# Patient Record
Sex: MALE | Race: BLACK OR AFRICAN AMERICAN
[De-identification: names, ages, dates, MRNs, and addresses within clinical notes are randomized per-mention and may not be internally consistent; named-entity substitution may affect disease eponyms.]

---

## 2018-01-06 ENCOUNTER — HOSPITAL ENCOUNTER (EMERGENCY)
Dept: HOSPITAL 62 - ER | Age: 33
Discharge: HOME | End: 2018-01-06
Payer: MEDICARE

## 2018-01-06 VITALS — SYSTOLIC BLOOD PRESSURE: 156 MMHG | DIASTOLIC BLOOD PRESSURE: 86 MMHG

## 2018-01-06 DIAGNOSIS — S89.92XA: Primary | ICD-10-CM

## 2018-01-06 DIAGNOSIS — F17.200: ICD-10-CM

## 2018-01-06 DIAGNOSIS — M25.562: ICD-10-CM
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
## 2018-01-06 LAB
ADD MANUAL DIFF: NO
ALBUMIN SERPL-MCNC: 4.1 G/DL (ref 3.5–5)
ALP SERPL-CCNC: 76 U/L (ref 38–126)
ALT SERPL-CCNC: 27 U/L (ref 21–72)
ANION GAP SERPL CALC-SCNC: 10 MMOL/L (ref 5–19)
AST SERPL-CCNC: 26 U/L (ref 17–59)
BASOPHILS # BLD AUTO: 0.1 10^3/UL (ref 0–0.2)
BASOPHILS NFR BLD AUTO: 0.8 % (ref 0–2)
BILIRUB DIRECT SERPL-MCNC: 0.3 MG/DL (ref 0–0.4)
BILIRUB SERPL-MCNC: 0.4 MG/DL (ref 0.2–1.3)
BUN SERPL-MCNC: 12 MG/DL (ref 7–20)
CALCIUM: 10 MG/DL (ref 8.4–10.2)
CHLORIDE SERPL-SCNC: 106 MMOL/L (ref 98–107)
CO2 SERPL-SCNC: 26 MMOL/L (ref 22–30)
CRP SERPL-MCNC: < 5 MG/L (ref ?–10)
EOSINOPHIL # BLD AUTO: 0 10^3/UL (ref 0–0.6)
EOSINOPHIL NFR BLD AUTO: 0.4 % (ref 0–6)
ERYTHROCYTE [DISTWIDTH] IN BLOOD BY AUTOMATED COUNT: 14.2 % (ref 11.5–14)
ERYTHROCYTE [SEDIMENTATION RATE] IN BLOOD: 7 MM/HR (ref 0–15)
GLUCOSE SERPL-MCNC: 112 MG/DL (ref 75–110)
HCT VFR BLD CALC: 43.9 % (ref 37.9–51)
HGB BLD-MCNC: 15 G/DL (ref 13.5–17)
LYMPHOCYTES # BLD AUTO: 1 10^3/UL (ref 0.5–4.7)
LYMPHOCYTES NFR BLD AUTO: 9.2 % (ref 13–45)
MCH RBC QN AUTO: 30.6 PG (ref 27–33.4)
MCHC RBC AUTO-ENTMCNC: 34.3 G/DL (ref 32–36)
MCV RBC AUTO: 89 FL (ref 80–97)
MONOCYTES # BLD AUTO: 1 10^3/UL (ref 0.1–1.4)
MONOCYTES NFR BLD AUTO: 9.1 % (ref 3–13)
NEUTROPHILS # BLD AUTO: 8.6 10^3/UL (ref 1.7–8.2)
NEUTS SEG NFR BLD AUTO: 80.5 % (ref 42–78)
PLATELET # BLD: 196 10^3/UL (ref 150–450)
POTASSIUM SERPL-SCNC: 3.6 MMOL/L (ref 3.6–5)
PROT SERPL-MCNC: 7.2 G/DL (ref 6.3–8.2)
RBC # BLD AUTO: 4.92 10^6/UL (ref 4.35–5.55)
SODIUM SERPL-SCNC: 141.9 MMOL/L (ref 137–145)
TOTAL CELLS COUNTED % (AUTO): 100 %
URATE SERPL-MCNC: 5.6 MG/DL (ref 3.5–8.5)
WBC # BLD AUTO: 10.7 10^3/UL (ref 4–10.5)

## 2018-01-06 PROCEDURE — L1830 KO IMMOB CANVAS LONG PRE OTS: HCPCS

## 2018-01-06 PROCEDURE — 85025 COMPLETE CBC W/AUTO DIFF WBC: CPT

## 2018-01-06 PROCEDURE — 73562 X-RAY EXAM OF KNEE 3: CPT

## 2018-01-06 PROCEDURE — 99284 EMERGENCY DEPT VISIT MOD MDM: CPT

## 2018-01-06 PROCEDURE — 84550 ASSAY OF BLOOD/URIC ACID: CPT

## 2018-01-06 PROCEDURE — 86140 C-REACTIVE PROTEIN: CPT

## 2018-01-06 PROCEDURE — 80053 COMPREHEN METABOLIC PANEL: CPT

## 2018-01-06 PROCEDURE — 85652 RBC SED RATE AUTOMATED: CPT

## 2018-01-06 PROCEDURE — 36415 COLL VENOUS BLD VENIPUNCTURE: CPT

## 2018-01-06 NOTE — ER DOCUMENT REPORT
ED Extremity Problem, Lower





- General


Chief Complaint: Knee Injury


Stated Complaint: LEFT KNEE PAIN


Time Seen by Provider: 01/06/18 01:09


Notes: 





Patient is a 32-year-old male who comes emergency department for chief 

complaint of left knee pain.  He states that he has landed on the knee 

accidentally because it hurt and he could not bear his weight and then he fell 

and landed on the knee, he states he has done this several times, he states 

that today the pain and swelling became worse and he cannot walk on it now.  He 

denies fever or chills.  He denies history of IV drug abuse, he denies history 

of joint surgery or joint infection.  He denies any other complaints.  Past 

medical history of hypertension and seizures, medicated for both.





- Related Data


Allergies/Adverse Reactions: 


 





No Known Allergies Allergy (Verified 01/06/18 00:41)


 











Past Medical History





- General


Information source: Patient





- Social History


Smoking Status: Current Some Day Smoker


Chew tobacco use (# tins/day): No


Frequency of alcohol use: None


Drug Abuse: None


Lives with: Family


Family History: None, Reviewed & Not Pertinent


Patient has suicidal ideation: No


Patient has homicidal ideation: No





- Past Medical History


Cardiac Medical History: Reports: Hx Hypertension


Neurological Medical History: Reports: Hx Seizures


Renal/ Medical History: Denies: Hx Peritoneal Dialysis


Surgical Hx: Negative





- Immunizations


Immunizations up to date: No


Hx Diphtheria, Pertussis, Tetanus Vaccination: Yes





Review of Systems





- Review of Systems


Constitutional: No symptoms reported


EENT: No symptoms reported


Cardiovascular: No symptoms reported


Respiratory: No symptoms reported


Gastrointestinal: No symptoms reported


Genitourinary: No symptoms reported


Male Genitourinary: No symptoms reported


Musculoskeletal: See HPI


Skin: No symptoms reported


Hematologic/Lymphatic: No symptoms reported


Neurological/Psychological: No symptoms reported





Physical Exam





- Vital signs


Vitals: 


 











Temp Pulse Resp BP Pulse Ox


 


 98.6 F   80   16   152/96 H  96 


 


 01/06/18 01:27  01/06/18 01:27  01/06/18 01:27  01/06/18 01:27  01/06/18 01:27











Interpretation: Normal





- General


General appearance: Appears well, Alert


In distress: None - Patient talking on the phone when I entered the room





- HEENT


Head: Normocephalic, Atraumatic


Eyes: Normal


Pupils: PERRL





- Respiratory


Respiratory status: No respiratory distress


Chest status: Nontender


Breath sounds: Normal.  No: Decreased air movement, Wheezing


Chest palpation: Normal





- Cardiovascular


Rhythm: Regular.  No: Tachycardia


Heart sounds: Normal auscultation, S1 appreciated, S2 appreciated


Murmur: No





- Abdominal


Inspection: Normal


Distension: No distension


Bowel sounds: Normal


Tenderness: Nontender


Organomegaly: No organomegaly





- Back


Back: Normal, Nontender.  No: Tender





- Extremities


General upper extremity: Normal inspection, Nontender, Normal color, Normal ROM

, Normal temperature


General lower extremity: Other - Left knee has some soft tissue swelling 

generally anteriorly, minimal erythema, no wounds, range of motion appears 

painful but is still intact, normal hip, normal ankle, normal distal 

neurovascular exam.





- Neurological


Neuro grossly intact: Yes


Cognition: Normal


Orientation: AAOx4


Enll Coma Scale Eye Opening: Spontaneous


Nell Coma Scale Verbal: Oriented


Ashley Coma Scale Motor: Obeys Commands


Ashley Coma Scale Total: 15


Speech: Normal


Motor strength normal: LUE, RUE, LLE, RLE


Sensory: Normal





- Psychological


Associated symptoms: Normal affect, Normal mood





- Skin


Skin Temperature: Warm


Skin Moisture: Dry


Skin Color: Normal





Course





- Re-evaluation


Re-evalutation: 


Reported trauma to the knee, no reported risk factors of septic joint, area is 

swollen with some soft tissue swelling and mild erythema but he can still move 

the joint even though he reports significant pain when doing so, CRP and ESR 

are negative, no fever, I have very low suspicion of septic joint.  No effusion 

on x-ray.  I do not believe that with the amount of swelling he has in his knee 

that I would be able to obtain any fluid for evaluation.  Will treat for 

traumatic swelling, provided with knee immobilizer, discussed follow-up and 

return precautions in detail.  Discussed with Dr. Alvarez.  Patient states 

understanding and agreement.





- Vital Signs


Vital signs: 


 











Temp Pulse Resp BP Pulse Ox


 


 99.2 F   82   18   156/86 H  96 


 


 01/06/18 04:08  01/06/18 04:08  01/06/18 04:08  01/06/18 04:08  01/06/18 04:08














- Laboratory


Result Diagrams: 


 01/06/18 02:15





 01/06/18 02:15


Laboratory results interpreted by me: 


 











  01/06/18 01/06/18





  02:15 02:15


 


WBC  10.7 H 


 


RDW  14.2 H 


 


Seg Neutrophils %  80.5 H 


 


Lymphocytes %  9.2 L 


 


Absolute Neutrophils  8.6 H 


 


Glucose   112 H














Procedures





- Immobilization


  ** Left knee


Pre-Proc Neuro Vasc Exam: Normal


Immobilizer type: Knee immobilizer


Performed by: RN, PCT


Post-Proc Neuro Vasc Exam: Normal


Alignment checked and good: Yes





Discharge





- Discharge


Clinical Impression: 


Left knee injury


Qualifiers:


 Encounter type: initial encounter Qualified Code(s): S89.92XA - Unspecified 

injury of left lower leg, initial encounter





Condition: Stable


Disposition: HOME, SELF-CARE


Instructions:  Use of Crutches ([facility]), Ice & Elevation ([facility]), Oral Narcotic 

Medication ([facility])


Additional Instructions: 


There is soft tissue swelling on exam, however no evidence of fracture, fluid 

in the joint, dislocation, or infection is seen on your evaluation and workup.  

This should resolve with time.  Apply ice to the area 3-4 times a day for 10-15 

minutes, take the naproxen anti-inflammatory, rest and elevate your knee.  Use 

the knee immobilizer and crutches at least for the next few days.  Follow-up 

with orthopedics referral if symptoms continue.  Return immediately if you 

worsen including developing redness, spreading swelling, fever, or any other 

concerning symptoms.


Prescriptions: 


Naproxen [Naprosyn 375 Mg Tablet] 375 mg PO BID #20 tablet


Forms:  Return to Work


Referrals: 


ЕКАТЕРИНА QUINN MD [ACTIVE STAFF] - Follow up in 1 week

## 2018-01-06 NOTE — RADIOLOGY REPORT (SQ)
EXAM DESCRIPTION: 



KNEE LEFT 3 VIEWS



CLINICAL HISTORY: 



32 years, Male, fall x5 with pain



COMPARISON:

None.



LIMITATIONS:

None.



Bones, joints, and soft tissues of the left knee appear intact.



IMPRESSION:



No acute findings.



 2011 [employer]- All Rights Reserved

## 2018-05-07 ENCOUNTER — HOSPITAL ENCOUNTER (EMERGENCY)
Dept: HOSPITAL 62 - ER | Age: 33
Discharge: LEFT BEFORE DISCHARGE | End: 2018-05-07
Payer: MEDICARE

## 2018-05-07 VITALS — SYSTOLIC BLOOD PRESSURE: 161 MMHG | DIASTOLIC BLOOD PRESSURE: 114 MMHG

## 2018-05-07 DIAGNOSIS — Z53.21: Primary | ICD-10-CM

## 2018-05-08 ENCOUNTER — HOSPITAL ENCOUNTER (EMERGENCY)
Dept: HOSPITAL 62 - ER | Age: 33
Discharge: HOME | End: 2018-05-08
Payer: COMMERCIAL

## 2018-05-08 VITALS — SYSTOLIC BLOOD PRESSURE: 158 MMHG | DIASTOLIC BLOOD PRESSURE: 104 MMHG

## 2018-05-08 DIAGNOSIS — M25.551: ICD-10-CM

## 2018-05-08 DIAGNOSIS — S16.1XXA: ICD-10-CM

## 2018-05-08 DIAGNOSIS — R51: ICD-10-CM

## 2018-05-08 DIAGNOSIS — I10: ICD-10-CM

## 2018-05-08 DIAGNOSIS — S50.811A: ICD-10-CM

## 2018-05-08 DIAGNOSIS — S70.211A: ICD-10-CM

## 2018-05-08 DIAGNOSIS — S92.902A: Primary | ICD-10-CM

## 2018-05-08 DIAGNOSIS — M79.1: ICD-10-CM
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
## 2018-05-08 PROCEDURE — 72125 CT NECK SPINE W/O DYE: CPT

## 2018-05-08 PROCEDURE — 72110 X-RAY EXAM L-2 SPINE 4/>VWS: CPT

## 2018-05-08 PROCEDURE — 71046 X-RAY EXAM CHEST 2 VIEWS: CPT

## 2018-05-08 PROCEDURE — 99284 EMERGENCY DEPT VISIT MOD MDM: CPT
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
## 2018-05-19 ENCOUNTER — HOSPITAL ENCOUNTER (EMERGENCY)
Dept: HOSPITAL 62 - ER | Age: 33
Discharge: HOME | End: 2018-05-19
Payer: MEDICARE

## 2018-05-19 VITALS — SYSTOLIC BLOOD PRESSURE: 161 MMHG | DIASTOLIC BLOOD PRESSURE: 115 MMHG

## 2018-05-19 DIAGNOSIS — I10: ICD-10-CM

## 2018-05-19 DIAGNOSIS — Z88.6: ICD-10-CM
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
## 2018-05-19 DIAGNOSIS — Z88.5: ICD-10-CM

## 2018-05-19 DIAGNOSIS — R56.9: Primary | ICD-10-CM

## 2018-05-19 DIAGNOSIS — S01.552A: ICD-10-CM

## 2018-05-19 LAB
ADD MANUAL DIFF: NO
ALBUMIN SERPL-MCNC: 3.9 G/DL (ref 3.5–5)
ALP SERPL-CCNC: 75 U/L (ref 38–126)
ALT SERPL-CCNC: 25 U/L (ref 21–72)
ANION GAP SERPL CALC-SCNC: 12 MMOL/L (ref 5–19)
AST SERPL-CCNC: 26 U/L (ref 17–59)
BASOPHILS # BLD AUTO: 0.1 10^3/UL (ref 0–0.2)
BASOPHILS NFR BLD AUTO: 0.9 % (ref 0–2)
BILIRUB DIRECT SERPL-MCNC: 0.3 MG/DL (ref 0–0.4)
BILIRUB SERPL-MCNC: 0.3 MG/DL (ref 0.2–1.3)
BUN SERPL-MCNC: 10 MG/DL (ref 7–20)
CALCIUM: 9.1 MG/DL (ref 8.4–10.2)
CHLORIDE SERPL-SCNC: 105 MMOL/L (ref 98–107)
CO2 SERPL-SCNC: 27 MMOL/L (ref 22–30)
EOSINOPHIL # BLD AUTO: 0 10^3/UL (ref 0–0.6)
EOSINOPHIL NFR BLD AUTO: 0.5 % (ref 0–6)
ERYTHROCYTE [DISTWIDTH] IN BLOOD BY AUTOMATED COUNT: 13.4 % (ref 11.5–14)
GLUCOSE SERPL-MCNC: 86 MG/DL (ref 75–110)
HCT VFR BLD CALC: 43 % (ref 37.9–51)
HGB BLD-MCNC: 14.8 G/DL (ref 13.5–17)
LYMPHOCYTES # BLD AUTO: 1 10^3/UL (ref 0.5–4.7)
LYMPHOCYTES NFR BLD AUTO: 15.7 % (ref 13–45)
MCH RBC QN AUTO: 30.5 PG (ref 27–33.4)
MCHC RBC AUTO-ENTMCNC: 34.4 G/DL (ref 32–36)
MCV RBC AUTO: 89 FL (ref 80–97)
MONOCYTES # BLD AUTO: 0.8 10^3/UL (ref 0.1–1.4)
MONOCYTES NFR BLD AUTO: 12.2 % (ref 3–13)
NEUTROPHILS # BLD AUTO: 4.6 10^3/UL (ref 1.7–8.2)
NEUTS SEG NFR BLD AUTO: 70.7 % (ref 42–78)
PLATELET # BLD: 222 10^3/UL (ref 150–450)
POTASSIUM SERPL-SCNC: 3.7 MMOL/L (ref 3.6–5)
PROT SERPL-MCNC: 7 G/DL (ref 6.3–8.2)
RBC # BLD AUTO: 4.85 10^6/UL (ref 4.35–5.55)
SODIUM SERPL-SCNC: 144.3 MMOL/L (ref 137–145)
TOTAL CELLS COUNTED % (AUTO): 100 %
WBC # BLD AUTO: 6.5 10^3/UL (ref 4–10.5)

## 2018-05-19 PROCEDURE — 36415 COLL VENOUS BLD VENIPUNCTURE: CPT

## 2018-05-19 PROCEDURE — 85025 COMPLETE CBC W/AUTO DIFF WBC: CPT

## 2018-05-19 PROCEDURE — 99284 EMERGENCY DEPT VISIT MOD MDM: CPT

## 2018-05-19 PROCEDURE — 83735 ASSAY OF MAGNESIUM: CPT

## 2018-05-19 PROCEDURE — 80053 COMPREHEN METABOLIC PANEL: CPT

## 2018-05-19 NOTE — ER DOCUMENT REPORT
ED General





- General


Stated Complaint: POSSIBLE SEIZURE/TOUNGE INJURY


Time Seen by Provider: 05/19/18 10:20


Mode of Arrival: Medic


Information source: Patient, Emergency Med Personnel


Notes: 





This is a 32-year-old man with a history of seizures in the past (as previously 

been on Dilantin) who is brought into the emergency room by EMS after seizure.  

Patient does complain that he bit his tongue during the seizure.  He does 

complain of pain to the right side of the tongue.


The patient denies any recent fevers, chills, infections.


He denies being under any stress.


He denies lack of sleep.


He denies alcohol or drug use.


TRAVEL OUTSIDE OF THE [place] IN LAST 30 DAYS: No





- HPI


Onset: Just prior to arrival


Onset/Duration: Sudden


Quality of pain: Dull


Severity: Moderate


Pain Level: 2


Associated symptoms: denies: Chest pain, Fever, Shortness of breath


Exacerbated by: Denies


Relieved by: Denies


Similar symptoms previously: No


Recently seen / treated by doctor: No





- Related Data


Allergies/Adverse Reactions: 


 





acetaminophen [From Vicodin] Allergy (Verified 05/08/18 11:39)


 


hydrocodone [From Vicodin] Allergy (Verified 05/08/18 11:39)


 











Past Medical History





- General


Information source: Patient





- Social History


Smoking Status: Never Smoker


Cigarette use (# per day): No


Chew tobacco use (# tins/day): No


Frequency of alcohol use: None


Drug Abuse: None


Lives with: Family


Family History: None, Reviewed & Not Pertinent


Patient has suicidal ideation: No


Patient has homicidal ideation: No





- Past Medical History


Cardiac Medical History: Reports: Hx Hypertension


Neurological Medical History: Reports: Hx Seizures


Renal/ Medical History: Denies: Hx Peritoneal Dialysis


Surgical Hx: Negative





- Immunizations


Immunizations up to date: No


Hx Diphtheria, Pertussis, Tetanus Vaccination: Yes





Review of Systems





- Review of Systems


Constitutional: denies: Chills, Fever


EENT: No symptoms reported


Cardiovascular: No symptoms reported


Respiratory: No symptoms reported


Gastrointestinal: No symptoms reported


Genitourinary: No symptoms reported


Male Genitourinary: No symptoms reported


Musculoskeletal: No symptoms reported


Skin: No symptoms reported


Hematologic/Lymphatic: No symptoms reported


Neurological/Psychological: See HPI





Physical Exam





- Vital signs


Vitals: 


 











Temp Pulse Resp BP Pulse Ox


 


 99.1 F   66   18   158/103 H  100 


 


 05/19/18 10:12  05/19/18 10:12  05/19/18 10:12  05/19/18 10:12  05/19/18 10:12











Notes: 





Physical exam:


 


GENERAL: A 32-year-old man, alert and oriented 3, no acute distress





HEAD: Atraumatic, normocephalic.





EYES: Pupils equal round and reactive to light, extraocular movements intact, 

sclera anicteric, conjunctiva are normal.





ENT: Oropharynx: Patient does have a puncture to the medial portion on the 

right side of the tongue with a small hematoma.  No obvious laceration.  There 

is no swelling of the airway.  There is no stridor.  No drooling.  Moist mucous 

membranes.





NECK: Normal range of motion, supple without obvious mass.





LUNGS: Breath sounds clear to auscultation bilaterally and equal.  No wheezes 

rales or rhonchi.





HEART: Regular rate and rhythm without murmurs, rubs or gallops.





ABDOMEN: Soft, normoactive bowel sounds.  No tenderness to palpation.  No 

guarding, no rebound.  No masses appreciated.





EXTREMITIES: Normal range of motion, no pitting or edema.  No clubbing or 

cyanosis.





NEUROLOGICAL: Cranial nerves II through XII grossly intact.  Normal speech, 

moving all extremities.





PSYCH: Normal mood, normal affect.





SKIN: Warm, Dry, normal turgor, no rashes or lesions noted.





Course





- Re-evaluation


Re-evalutation: 





05/19/18 10:25


The charts for previous presentations with seizure have been reviewed.


Patient did not tolerate any IV Dilantin so we will attempt to give it orally.


05/19/18 11:47





05/19/18 12:44


I have also given him the number for the [facility] clinic for blood 

pressure recheck.





- Vital Signs


Vital signs: 


 











Temp Pulse Resp BP Pulse Ox


 


 98.6 F   64   18   161/115 H  96 


 


 05/19/18 12:42  05/19/18 10:13  05/19/18 12:43  05/19/18 12:41  05/19/18 12:43














- Laboratory


Result Diagrams: 


 05/19/18 11:00





 05/19/18 11:00





Discharge





- Discharge


Clinical Impression: 


 Seizure, TONGUE bITE, Hypertension





Condition: Stable


Disposition: HOME, SELF-CARE


Instructions:  Seizure, Known Epileptic ([facility])


Additional Instructions: 


As we discussed, the tongue bite will take a week or 2 to heal.  There is no 

need for sutures at this time.  I recommend


Listerine rinses twice daily.


For the seizure disorder, I recommend avoiding alcohol.  See the seizure 

instruction sheet.  I wrote for prescription for Dilantin and I would like you 

to follow-up with a neurologist.  I put the number on the chart.


Return to the emergency room for any concerns that the seizures are getting 

worse or if the tongue starts getting larger or there are concerns that it is 

getting more painful.


Prescriptions: 


Phenytoin Sodium Extended [Dilantin 100 mg Capsule.er] 100 mg PO Q8 #90 capsule


Forms:  Elevated Blood Pressure


Referrals: 


BRENNON CARRANZA MD [NO LOCAL MD] - Follow up as needed (This is the number for 

a neurologist)


[facility] [Provider Group] - Follow up as needed (This is the 

number of the free clinic affiliated with the hospital: I want you to call them 

for blood pressure recheck.)

## 2018-08-25 ENCOUNTER — HOSPITAL ENCOUNTER (EMERGENCY)
Dept: HOSPITAL 62 - ER | Age: 33
LOS: 1 days | Discharge: HOME | End: 2018-08-26
Payer: MEDICAID

## 2018-08-25 DIAGNOSIS — Z88.6: ICD-10-CM
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
## 2018-08-25 DIAGNOSIS — R56.9: Primary | ICD-10-CM

## 2018-08-25 DIAGNOSIS — S01.552A: ICD-10-CM

## 2018-08-25 LAB
ADD MANUAL DIFF: NO
ADD MANUAL DIFF: NO
ALBUMIN SERPL-MCNC: 4.4 G/DL (ref 3.5–5)
ALP SERPL-CCNC: 89 U/L (ref 38–126)
ALT SERPL-CCNC: 30 U/L (ref 21–72)
ANION GAP SERPL CALC-SCNC: 15 MMOL/L (ref 5–19)
APPEARANCE UR: (no result)
APTT PPP: YELLOW S
AST SERPL-CCNC: 40 U/L (ref 17–59)
BARBITURATES UR QL SCN: NEGATIVE
BASE EXCESS BLDV CALC-SCNC: -2.9 MMOL/L
BASOPHILS # BLD AUTO: 0.1 10^3/UL (ref 0–0.2)
BASOPHILS # BLD AUTO: 0.1 10^3/UL (ref 0–0.2)
BASOPHILS NFR BLD AUTO: 0.6 % (ref 0–2)
BASOPHILS NFR BLD AUTO: 0.6 % (ref 0–2)
BILIRUB DIRECT SERPL-MCNC: 0.3 MG/DL (ref 0–0.4)
BILIRUB SERPL-MCNC: 0.3 MG/DL (ref 0.2–1.3)
BILIRUB UR QL STRIP: NEGATIVE
BUN SERPL-MCNC: 11 MG/DL (ref 7–20)
CALCIUM: 9.1 MG/DL (ref 8.4–10.2)
CHLORIDE SERPL-SCNC: 108 MMOL/L (ref 98–107)
CO2 SERPL-SCNC: 21 MMOL/L (ref 22–30)
EOSINOPHIL # BLD AUTO: 0 10^3/UL (ref 0–0.6)
EOSINOPHIL # BLD AUTO: 0 10^3/UL (ref 0–0.6)
EOSINOPHIL NFR BLD AUTO: 0.1 % (ref 0–6)
EOSINOPHIL NFR BLD AUTO: 0.2 % (ref 0–6)
ERYTHROCYTE [DISTWIDTH] IN BLOOD BY AUTOMATED COUNT: 13.9 % (ref 11.5–14)
ERYTHROCYTE [DISTWIDTH] IN BLOOD BY AUTOMATED COUNT: 14.1 % (ref 11.5–14)
ETHANOL SERPL-MCNC: < 10 MG/DL
GLUCOSE SERPL-MCNC: 84 MG/DL (ref 75–110)
GLUCOSE UR STRIP-MCNC: NEGATIVE MG/DL
HCO3 BLDV-SCNC: 21.7 MMOL/L (ref 20–32)
HCT VFR BLD CALC: 42.1 % (ref 37.9–51)
HCT VFR BLD CALC: 46.1 % (ref 37.9–51)
HGB BLD-MCNC: 14.6 G/DL (ref 13.5–17)
HGB BLD-MCNC: 16 G/DL (ref 13.5–17)
KETONES UR STRIP-MCNC: (no result) MG/DL
LYMPHOCYTES # BLD AUTO: 0.6 10^3/UL (ref 0.5–4.7)
LYMPHOCYTES # BLD AUTO: 1.4 10^3/UL (ref 0.5–4.7)
LYMPHOCYTES NFR BLD AUTO: 14.7 % (ref 13–45)
LYMPHOCYTES NFR BLD AUTO: 5.7 % (ref 13–45)
MCH RBC QN AUTO: 30.9 PG (ref 27–33.4)
MCH RBC QN AUTO: 31 PG (ref 27–33.4)
MCHC RBC AUTO-ENTMCNC: 34.6 G/DL (ref 32–36)
MCHC RBC AUTO-ENTMCNC: 34.6 G/DL (ref 32–36)
MCV RBC AUTO: 89 FL (ref 80–97)
MCV RBC AUTO: 89 FL (ref 80–97)
METHADONE UR QL SCN: NEGATIVE
MONOCYTES # BLD AUTO: 1.2 10^3/UL (ref 0.1–1.4)
MONOCYTES # BLD AUTO: 1.3 10^3/UL (ref 0.1–1.4)
MONOCYTES NFR BLD AUTO: 10.9 % (ref 3–13)
MONOCYTES NFR BLD AUTO: 13.4 % (ref 3–13)
NEUTROPHILS # BLD AUTO: 6.8 10^3/UL (ref 1.7–8.2)
NEUTROPHILS # BLD AUTO: 8.7 10^3/UL (ref 1.7–8.2)
NEUTS SEG NFR BLD AUTO: 71.1 % (ref 42–78)
NEUTS SEG NFR BLD AUTO: 82.7 % (ref 42–78)
NITRITE UR QL STRIP: NEGATIVE
PCO2 BLDV: 37.5 MMHG (ref 35–63)
PCP UR QL SCN: NEGATIVE
PH BLDV: 7.38 [PH] (ref 7.3–7.42)
PH UR STRIP: 5 [PH] (ref 5–9)
PLATELET # BLD: 176 10^3/UL (ref 150–450)
PLATELET # BLD: 181 10^3/UL (ref 150–450)
POTASSIUM SERPL-SCNC: 3.7 MMOL/L (ref 3.6–5)
PROT SERPL-MCNC: 8.2 G/DL (ref 6.3–8.2)
PROT UR STRIP-MCNC: NEGATIVE MG/DL
RBC # BLD AUTO: 4.71 10^6/UL (ref 4.35–5.55)
RBC # BLD AUTO: 5.16 10^6/UL (ref 4.35–5.55)
SODIUM SERPL-SCNC: 144.2 MMOL/L (ref 137–145)
SP GR UR STRIP: 1.02
TOTAL CELLS COUNTED % (AUTO): 100 %
TOTAL CELLS COUNTED % (AUTO): 100 %
URATE CRY #/AREA URNS HPF: (no result) /HPF
URINE AMPHETAMINES SCREEN: NEGATIVE
URINE BENZODIAZEPINES SCREEN: NEGATIVE
URINE COCAINE SCREEN: NEGATIVE
URINE MARIJUANA (THC) SCREEN: NEGATIVE
UROBILINOGEN UR-MCNC: NEGATIVE MG/DL (ref ?–2)
WBC # BLD AUTO: 10.5 10^3/UL (ref 4–10.5)
WBC # BLD AUTO: 9.5 10^3/UL (ref 4–10.5)

## 2018-08-25 PROCEDURE — 81001 URINALYSIS AUTO W/SCOPE: CPT

## 2018-08-25 PROCEDURE — 82803 BLOOD GASES ANY COMBINATION: CPT

## 2018-08-25 PROCEDURE — 96361 HYDRATE IV INFUSION ADD-ON: CPT

## 2018-08-25 PROCEDURE — 71045 X-RAY EXAM CHEST 1 VIEW: CPT

## 2018-08-25 PROCEDURE — 83735 ASSAY OF MAGNESIUM: CPT

## 2018-08-25 PROCEDURE — 70450 CT HEAD/BRAIN W/O DYE: CPT

## 2018-08-25 PROCEDURE — 85025 COMPLETE CBC W/AUTO DIFF WBC: CPT

## 2018-08-25 PROCEDURE — 82550 ASSAY OF CK (CPK): CPT

## 2018-08-25 PROCEDURE — 96375 TX/PRO/DX INJ NEW DRUG ADDON: CPT

## 2018-08-25 PROCEDURE — 80307 DRUG TEST PRSMV CHEM ANLYZR: CPT

## 2018-08-25 PROCEDURE — 93010 ELECTROCARDIOGRAM REPORT: CPT

## 2018-08-25 PROCEDURE — 87040 BLOOD CULTURE FOR BACTERIA: CPT

## 2018-08-25 PROCEDURE — 36415 COLL VENOUS BLD VENIPUNCTURE: CPT

## 2018-08-25 PROCEDURE — 93005 ELECTROCARDIOGRAM TRACING: CPT

## 2018-08-25 PROCEDURE — 83605 ASSAY OF LACTIC ACID: CPT

## 2018-08-25 PROCEDURE — 99285 EMERGENCY DEPT VISIT HI MDM: CPT

## 2018-08-25 PROCEDURE — 80053 COMPREHEN METABOLIC PANEL: CPT

## 2018-08-25 PROCEDURE — 82553 CREATINE MB FRACTION: CPT

## 2018-08-25 PROCEDURE — 96374 THER/PROPH/DIAG INJ IV PUSH: CPT

## 2018-08-25 NOTE — RADIOLOGY REPORT (SQ)
EXAM DESCRIPTION:  CT HEAD WITHOUT



COMPLETED DATE/TIME:  8/25/2018 9:08 pm



REASON FOR STUDY:  ams



COMPARISON:  5/1/2014



TECHNIQUE:  Axial images acquired through the brain without intravenous contrast.  Images reviewed wi
th bone, brain and subdural windows.  Additional sagittal and coronal reconstructions were generated.
 Images stored on PACS.

All CT scanners at this facility use dose modulation, iterative reconstruction, and/or weight based d
osing when appropriate to reduce radiation dose to as low as reasonably achievable (ALARA).

[facility]: Dose Right  [facility]: CareDose    [facility]: Dose Right    [facility]: Teradose 4D    [facility]: Smart 9facts



RADIATION DOSE:  CT Rad equipment meets quality standard of care and radiation dose reduction techniq
ues were employed. CTDIvol: 53.2 mGy. DLP: 1124 mGy-cm. mGy.



LIMITATIONS:  None.



FINDINGS:  VENTRICLES: Normal size and contour.

CEREBRUM: No masses.  No hemorrhage.  No midline shift.  No evidence for acute infarction. Normal gra
y/white matter differentiation. No areas of low density in the white matter.

CEREBELLUM: No masses.  No hemorrhage.  No alteration of density.  No evidence for acute infarction.

EXTRAAXIAL SPACES: No fluid collections.  No masses.

ORBITS AND GLOBE: No intra- or extraconal masses.  Normal contour of globe without masses.

CALVARIUM: No fracture.

PARANASAL SINUSES: No fluid or mucosal thickening.

SOFT TISSUES: No mass or hematoma.

OTHER: No other significant finding.



IMPRESSION:  NORMAL BRAIN CT WITHOUT CONTRAST.

EVIDENCE OF ACUTE STROKE: NO.



COMMENT:  Quality ID # [id number]: Final reports with documentation of one or more dose reduction techniques
 (e.g., Automated exposure control, adjustment of the mA and/or kV according to patient size, use of 
iterative reconstruction technique)



TECHNICAL DOCUMENTATION:  JOB ID:  [id number]

 2011 [employer]- All Rights Reserved



Reading location - IP/workstation name: [email or web address]
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
## 2018-08-25 NOTE — RADIOLOGY REPORT (SQ)
EXAM DESCRIPTION:  CHEST SINGLE VIEW



COMPLETED DATE/TIME:  8/25/2018 9:30 pm



REASON FOR STUDY:  fever



COMPARISON:  5/8/2018



EXAM PARAMETERS:  NUMBER OF VIEWS: One view.

TECHNIQUE: Single frontal radiographic view of the chest acquired.

RADIATION DOSE: NA

LIMITATIONS: None.



FINDINGS:  LUNGS AND PLEURA: No opacities, masses or pneumothorax. No pleural effusion.

MEDIASTINUM AND HILAR STRUCTURES: No masses.  Contour normal.

HEART AND VASCULAR STRUCTURES: Heart normal in size.  Normal vasculature.

BONES: No acute findings.

HARDWARE: None in the chest.

OTHER: No other significant finding.



IMPRESSION:  NO ACUTE RADIOGRAPHIC FINDING IN THE CHEST.



TECHNICAL DOCUMENTATION:  JOB ID:  [id number]

 2011 [employer]- All Rights Reserved



Reading location - IP/workstation name: [email or web address]

## 2018-08-25 NOTE — EKG REPORT
SEVERITY:- ABNORMAL ECG -

SINUS RHYTHM

PROBABLE LEFT ATRIAL ABNORMALITY

LEFT VENTRICULAR HYPERTROPHY

ST ELEVATION, CONSIDER ANTERIOR INJURY

:

Confirmed by: Bobby Zuleta 25-Aug-2018 20:53:23

## 2018-08-26 VITALS — DIASTOLIC BLOOD PRESSURE: 120 MMHG | SYSTOLIC BLOOD PRESSURE: 163 MMHG

## 2018-08-30 ENCOUNTER — HOSPITAL ENCOUNTER (EMERGENCY)
Dept: HOSPITAL 62 - ER | Age: 33
Discharge: HOME | End: 2018-08-30
Payer: MEDICARE

## 2018-08-30 VITALS — SYSTOLIC BLOOD PRESSURE: 177 MMHG | DIASTOLIC BLOOD PRESSURE: 124 MMHG

## 2018-08-30 DIAGNOSIS — D17.24: Primary | ICD-10-CM

## 2018-08-30 DIAGNOSIS — S01.552A: ICD-10-CM

## 2018-08-30 DIAGNOSIS — I10: ICD-10-CM
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
## 2018-08-30 DIAGNOSIS — F17.200: ICD-10-CM

## 2018-08-30 PROCEDURE — 99282 EMERGENCY DEPT VISIT SF MDM: CPT

## 2018-08-30 NOTE — ER DOCUMENT REPORT
ED General





- General


Chief Complaint: Abscess


Stated Complaint: ABSCESS


Time Seen by Provider: 08/30/18 04:14


Mode of Arrival: Ambulatory


Information source: Patient


Notes: 





Patient presents with complaint of possible abscess to his right upper leg just 

below the gluteal fold.  Patient reports this is been there for several years.  

Patient also complaining of abnormality to his tongue.  Reports that he bit his 

tongue during a seizure a few days ago and patient thinks it is infected.  

Patient denies any fevers.


TRAVEL OUTSIDE OF THE [place] IN LAST 30 DAYS: No





- HPI


Onset/Duration: Gradual





- Related Data


Allergies/Adverse Reactions: 


 





acetaminophen [From Vicodin] Allergy (Verified 05/08/18 11:39)


 


hydrocodone [From Vicodin] Allergy (Verified 05/08/18 11:39)


 











Past Medical History





- General


Information source: Patient





- Social History


Smoking Status: Current Every Day Smoker


Chew tobacco use (# tins/day): No


Frequency of alcohol use: Social


Drug Abuse: None


Family History: None, Reviewed & Not Pertinent


Patient has suicidal ideation: No


Patient has homicidal ideation: No





- Past Medical History


Cardiac Medical History: Reports: Hx Hypertension


Neurological Medical History: Reports: Hx Seizures


Renal/ Medical History: Denies: Hx Peritoneal Dialysis


Surgical Hx: Negative





- Immunizations


Immunizations up to date: No


Hx Diphtheria, Pertussis, Tetanus Vaccination: Yes





Review of Systems





- Review of Systems


EENT: Other - Tongue pain


Skin: See HPI


-: Yes All other systems reviewed and negative





Physical Exam





- Vital signs


Vitals: 


 











Pulse BP Pulse Ox


 


 71   177/124 H  100 


 


 08/30/18 01:47  08/30/18 01:47  08/30/18 01:47














- Notes


Notes: 





PHYSICAL EXAMINATION:





GENERAL: Well-appearing, well-nourished and in no acute distress.





HEAD: Atraumatic, normocephalic.





EYES: Pupils equal round and reactive to light, extraocular movements intact, 

sclera anicteric, conjunctiva are normal.





ENT: Nares patent, oropharynx clear without exudates.  Moist mucous membranes.  

Greenish discoloration noted to right side of patient's tongue near a healing 

laceration.





NECK: Normal range of motion, supple without lymphadenopathy





LUNGS: Breath sounds clear to auscultation bilaterally and equal.  No wheezes 

rales or rhonchi.





HEART: Regular rate and rhythm without murmurs





ABDOMEN: Soft, nontender, nondistended abdomen.  No guarding, no rebound.  No 

masses appreciated.





Musculoskeletal: Normal range of motion, no pitting or edema.  No cyanosis.





NEUROLOGICAL: Cranial nerves grossly intact.  Normal speech, normal gait.  

Normal sensory, motor exams 





PSYCH: Normal mood, normal affect.





SKIN: Warm, Dry, normal turgor, no rashes or lesions noted.  Soft raised 

nontender area noted to right posterior leg just below the gluteal fold, 

consistent with lipoma.





Course





- Re-evaluation


Re-evalutation: 





Patient will be referred back to his primary care provider for his lipoma.  

Patient will be placed on antibiotics and instructed to use Listerine mouth 

rinses for the infection to his tongue.  Patient verbalizes understanding of 

the plan and agrees to same.





- Vital Signs


Vital signs: 


 











Temp Pulse Resp BP Pulse Ox


 


    71      177/124 H  100 


 


    08/30/18 01:47     08/30/18 01:47  08/30/18 01:47














Discharge





- Discharge


Clinical Impression: 


 Tongue biting





Lipoma


Qualifiers:


 Lipoma location: lower extremity Laterality: left Qualified Code(s): D17.24 - 

Benign lipomatous neoplasm of skin and subcutaneous tissue of left leg





Clinical Impression: 


 (Ruled Out): Tongue abnormality


Condition: Good


Disposition: HOME, SELF-CARE


Additional Instructions: 


Follow up with primary care provider concerning treatment of lipoma on 

posterior aspect of right lower leg. This benign lesion cannot be removed in 

the ER and will require surgical consult. 


Complete Augmentin antibiotic for tongue infection most likely caused by biting 

during recent seizure. Discussed proper oral hygiene and recommend frequent 

washing mouth with Listerine mouthwash 1-2 times daily. 





Prescriptions: 


Amox Tr/Potassium Clavulanate [Augmentin 875-125 Tablet] 1 tab PO BID 10 Days #

14 tablet


Forms:  Smoking Cessation Education

## 2018-10-09 ENCOUNTER — HOSPITAL ENCOUNTER (EMERGENCY)
Dept: HOSPITAL 62 - ER | Age: 33
Discharge: HOME | End: 2018-10-09
Payer: MEDICARE

## 2018-10-09 VITALS — DIASTOLIC BLOOD PRESSURE: 98 MMHG | SYSTOLIC BLOOD PRESSURE: 158 MMHG

## 2018-10-09 DIAGNOSIS — E88.89: ICD-10-CM

## 2018-10-09 DIAGNOSIS — F17.200: ICD-10-CM

## 2018-10-09 DIAGNOSIS — Z88.5: ICD-10-CM

## 2018-10-09 DIAGNOSIS — D72.829: ICD-10-CM

## 2018-10-09 DIAGNOSIS — T42.6X6A: ICD-10-CM

## 2018-10-09 DIAGNOSIS — R22.42: ICD-10-CM

## 2018-10-09 DIAGNOSIS — R31.9: ICD-10-CM

## 2018-10-09 DIAGNOSIS — F10.20: ICD-10-CM

## 2018-10-09 DIAGNOSIS — G40.909: Primary | ICD-10-CM

## 2018-10-09 DIAGNOSIS — I10: ICD-10-CM

## 2018-10-09 DIAGNOSIS — Z91.14: ICD-10-CM

## 2018-10-09 DIAGNOSIS — Z88.6: ICD-10-CM

## 2018-10-09 DIAGNOSIS — R74.8: ICD-10-CM

## 2018-10-09 LAB
ADD MANUAL DIFF: NO
ALBUMIN SERPL-MCNC: 4.9 G/DL (ref 3.5–5)
ALP SERPL-CCNC: 86 U/L (ref 38–126)
ALT SERPL-CCNC: 24 U/L (ref 21–72)
ANION GAP SERPL CALC-SCNC: 25 MMOL/L (ref 5–19)
ANION GAP SERPL CALC-SCNC: 7 MMOL/L (ref 5–19)
APAP SERPL-MCNC: < 10 UG/ML (ref 10–30)
APPEARANCE UR: (no result)
APTT PPP: (no result) S
ARTERIAL BLOOD FIO2: (no result)
ARTERIAL BLOOD H2CO3: 1.31 MMOL/L (ref 1.05–1.35)
ARTERIAL BLOOD HCO3: 23.2 MMOL/L (ref 20–24)
ARTERIAL BLOOD PCO2: 43.4 MMHG (ref 35–45)
ARTERIAL BLOOD PH: 7.35 (ref 7.35–7.45)
ARTERIAL BLOOD PO2: 85.7 MMHG (ref 80–100)
ARTERIAL BLOOD TOTAL CO2: 24.5 MMOL/L (ref 23–27)
AST SERPL-CCNC: 36 U/L (ref 17–59)
BARBITURATES UR QL SCN: NEGATIVE
BASE EXCESS BLDA CALC-SCNC: -2.6 MMOL/L
BASOPHILS # BLD AUTO: 0.1 10^3/UL (ref 0–0.2)
BASOPHILS NFR BLD AUTO: 0.6 % (ref 0–2)
BILIRUB DIRECT SERPL-MCNC: 0.5 MG/DL (ref 0–0.4)
BILIRUB SERPL-MCNC: 0.5 MG/DL (ref 0.2–1.3)
BILIRUB UR QL STRIP: NEGATIVE
BUN SERPL-MCNC: 12 MG/DL (ref 7–20)
BUN SERPL-MCNC: 12 MG/DL (ref 7–20)
CALCIUM: 9 MG/DL (ref 8.4–10.2)
CALCIUM: 9.7 MG/DL (ref 8.4–10.2)
CHLORIDE SERPL-SCNC: 109 MMOL/L (ref 98–107)
CHLORIDE SERPL-SCNC: 111 MMOL/L (ref 98–107)
CO2 SERPL-SCNC: 10 MMOL/L (ref 22–30)
CO2 SERPL-SCNC: 23 MMOL/L (ref 22–30)
EOSINOPHIL # BLD AUTO: 0 10^3/UL (ref 0–0.6)
EOSINOPHIL NFR BLD AUTO: 0.3 % (ref 0–6)
ERYTHROCYTE [DISTWIDTH] IN BLOOD BY AUTOMATED COUNT: 13.7 % (ref 11.5–14)
ETHANOL SERPL-MCNC: < 10 MG/DL
GLUCOSE SERPL-MCNC: 104 MG/DL (ref 75–110)
GLUCOSE SERPL-MCNC: 117 MG/DL (ref 75–110)
GLUCOSE UR STRIP-MCNC: NEGATIVE MG/DL
HCT VFR BLD CALC: 48 % (ref 37.9–51)
HGB BLD-MCNC: 15.8 G/DL (ref 13.5–17)
KETONES UR STRIP-MCNC: NEGATIVE MG/DL
LYMPHOCYTES # BLD AUTO: 2.3 10^3/UL (ref 0.5–4.7)
LYMPHOCYTES NFR BLD AUTO: 20.4 % (ref 13–45)
MCH RBC QN AUTO: 30.7 PG (ref 27–33.4)
MCHC RBC AUTO-ENTMCNC: 33 G/DL (ref 32–36)
MCV RBC AUTO: 93 FL (ref 80–97)
METHADONE UR QL SCN: NEGATIVE
MONOCYTES # BLD AUTO: 0.7 10^3/UL (ref 0.1–1.4)
MONOCYTES NFR BLD AUTO: 6.5 % (ref 3–13)
NEUTROPHILS # BLD AUTO: 8.1 10^3/UL (ref 1.7–8.2)
NEUTS SEG NFR BLD AUTO: 72.2 % (ref 42–78)
NITRITE UR QL STRIP: NEGATIVE
PCP UR QL SCN: NEGATIVE
PH UR STRIP: 5 [PH] (ref 5–9)
PLATELET # BLD: 269 10^3/UL (ref 150–450)
POTASSIUM SERPL-SCNC: 3.8 MMOL/L (ref 3.6–5)
POTASSIUM SERPL-SCNC: 4.1 MMOL/L (ref 3.6–5)
PROT SERPL-MCNC: 8.7 G/DL (ref 6.3–8.2)
PROT UR STRIP-MCNC: 30 MG/DL
RBC # BLD AUTO: 5.16 10^6/UL (ref 4.35–5.55)
SALICYLATES SERPL-MCNC: < 1 MG/DL (ref 2–20)
SAO2 % BLDA: 96 % (ref 94–98)
SODIUM SERPL-SCNC: 141.1 MMOL/L (ref 137–145)
SODIUM SERPL-SCNC: 143.8 MMOL/L (ref 137–145)
SP GR UR STRIP: 1.01
TOTAL CELLS COUNTED % (AUTO): 100 %
URINE AMPHETAMINES SCREEN: NEGATIVE
URINE BENZODIAZEPINES SCREEN: NEGATIVE
URINE COCAINE SCREEN: NEGATIVE
URINE MARIJUANA (THC) SCREEN: NEGATIVE
UROBILINOGEN UR-MCNC: NEGATIVE MG/DL (ref ?–2)
WBC # BLD AUTO: 11.2 10^3/UL (ref 4–10.5)

## 2018-10-09 PROCEDURE — 36415 COLL VENOUS BLD VENIPUNCTURE: CPT

## 2018-10-09 PROCEDURE — 82803 BLOOD GASES ANY COMBINATION: CPT

## 2018-10-09 PROCEDURE — 51701 INSERT BLADDER CATHETER: CPT

## 2018-10-09 PROCEDURE — 80053 COMPREHEN METABOLIC PANEL: CPT

## 2018-10-09 PROCEDURE — 80177 DRUG SCRN QUAN LEVETIRACETAM: CPT

## 2018-10-09 PROCEDURE — 99284 EMERGENCY DEPT VISIT MOD MDM: CPT

## 2018-10-09 PROCEDURE — 80307 DRUG TEST PRSMV CHEM ANLYZR: CPT

## 2018-10-09 PROCEDURE — 80048 BASIC METABOLIC PNL TOTAL CA: CPT

## 2018-10-09 PROCEDURE — 82553 CREATINE MB FRACTION: CPT

## 2018-10-09 PROCEDURE — 82550 ASSAY OF CK (CPK): CPT

## 2018-10-09 PROCEDURE — 81001 URINALYSIS AUTO W/SCOPE: CPT

## 2018-10-09 PROCEDURE — 96375 TX/PRO/DX INJ NEW DRUG ADDON: CPT

## 2018-10-09 PROCEDURE — 85025 COMPLETE CBC W/AUTO DIFF WBC: CPT

## 2018-10-09 PROCEDURE — 96374 THER/PROPH/DIAG INJ IV PUSH: CPT

## 2018-10-09 PROCEDURE — 96361 HYDRATE IV INFUSION ADD-ON: CPT

## 2018-10-09 PROCEDURE — 36600 WITHDRAWAL OF ARTERIAL BLOOD: CPT

## 2018-10-09 NOTE — ER DOCUMENT REPORT
ED General





- General


Mode of Arrival: Medic


Information source: Emergency Med Personnel


Cannot obtain history due to: Altered mental status





<CHANTAL TERAN - Last Filed: 10/09/18 14:55>





<JUAN SCHULZT - Last Filed: 10/09/18 18:52>





- General


Chief Complaint: Seizure


Stated Complaint: POSSIBLE SEIZURE


Time Seen by Provider: 10/09/18 11:40


Notes: 


Patient is a 32 year old male with epilepsy (TBI related) presents to the 

emergency department via EMS due to seizures. According to staff patient had 4 

witnessed tonic clonic seizures prior to arrival and 1 tonic clonic seizure 

while being in the emergency department. According to nurse, patient went out 

drinking last night and is non-compliant with his medications of Keppra. 

Patient is currently sleeping and no further history was obtained. 





Patient recently filled his prescription of 500 mg of Keppra, 2x daily on 

September 1st, 2018 for 60 tablets. Patient currently has 11 tablets left. 


 (CHANTAL TERAN)





- Related Data


Allergies/Adverse Reactions: 


 





acetaminophen [From Vicodin] Allergy (Verified 05/08/18 11:39)


 


hydrocodone [From Vicodin] Allergy (Verified 05/08/18 11:39)


 











Past Medical History





- General


Information source: Emergency Med Personnel





- Social History


Smoking Status: Current Every Day Smoker


Frequency of alcohol use: Social


Family History: None, Reviewed & Not Pertinent


Patient has suicidal ideation: No


Patient has homicidal ideation: No





- Past Medical History


Cardiac Medical History: Reports: Hx Hypertension


Neurological Medical History: Reports: Hx Seizures





- Immunizations


Immunizations up to date: No


Hx Diphtheria, Pertussis, Tetanus Vaccination: Yes





<CHANTAL TERAN - Last Filed: 10/09/18 14:55>





Review of Systems





- Review of Systems


Constitutional: No symptoms reported


EENT: No symptoms reported


Cardiovascular: No symptoms reported


Respiratory: No symptoms reported


Gastrointestinal: No symptoms reported


Genitourinary: No symptoms reported


Male Genitourinary: No symptoms reported


Musculoskeletal: No symptoms reported


Skin: No symptoms reported


Hematologic/Lymphatic: No symptoms reported


Neurological/Psychological: See HPI, Seizure


-: Yes All other systems reviewed and negative





<CHANTAL TERAN - Last Filed: 10/09/18 14:55>





Physical Exam





<CHANTAL TERAN - Last Filed: 10/09/18 14:55>





<JUAN SCHULTZ - Last Filed: 10/09/18 18:52>





- Vital signs


Vitals: 


 











Resp BP Pulse Ox


 


 15   154/90 H  97 


 


 10/09/18 11:05  10/09/18 11:05  10/09/18 11:05














- Notes


Notes: 


GENERAL: Sleeping, sedated appropriately, given 2 Ativan IV. Does not follow 

commands.  No acute distress.


HEAD: Normocephalic, atraumatic.


EYES: Pupils equal, round, and reactive to light. Extraocular movements intact.


ENT: Oral mucosa moist, tongue midline, appears to have bitten left side of 

tongue, less than half cm laceration. 


NECK: Full range of motion. Supple. Trachea midline.


LUNGS: Clear to auscultation bilaterally, no wheezes, rales, or rhonchi. No 

respiratory distress.


HEART: Regular rate and rhythm. No murmurs, gallops, or rubs.


ABDOMEN: Soft, non-tender. Non-distended. Bowel sounds present in all 4 

quadrants.


EXTREMITIES: Moves all 4 extremities spontaneously. Left upper posterior thigh 

contains a soft nontender mass consistent with lipoma, no signs of infection, 

when palpating patient states to stop touching due to being ticklish.


NEUROLOGICAL: Sleeping, does not follow commands. Withdraws from examination of 

tongue, opens eyes to noxious stimuli.  No evidence of bladder or bowel 

incontinence. 


PSYCH: Sleeping.


SKIN: Warm, dry, normal turgor. No rashes or lesions noted.








 (CHANTAL TERAN)





Course





- Laboratory


Result Diagrams: 


 10/09/18 11:40





 10/09/18 11:40





<CHANTAL TERAN - Last Filed: 10/09/18 14:55>





- Laboratory


Result Diagrams: 


 10/09/18 11:40





 10/09/18 15:55





<JUAN SCHULTZ - Last Filed: 10/09/18 18:52>





- Re-evaluation


Re-evalutation: 





10/09/18 16:54


Patient arrived postictal, proceeded to have another full body tonic-clonic 

seizure that lasted for less than 1 minute, he was treated with 2 mg of Ativan 

IV and since then has had no further seizure activity.  Pill bottle at the 

bedside was a prescription for Keppra which was filled on September 1 he was 

supposed to be taking 1 tablet twice a day and he was prescribed 60 pills, 

patient still had approximately 11 pills remaining.  This means he is not 

taking his medications as frequently as directed.  CBC shows mild leukocytosis 

at 11.2 otherwise unremarkable, arterial blood gas does not show any acidosis, 

CMP shows significantly low CO2 at 10 anion gap of 25 creatinine elevated 1.53, 

I am concerned for either starvation ketosis or alcoholic ketoacidosis given 

these findings, patient was hydrated with a liter of D5 normal saline and his 

labs were rechecked, chemistries now show a normal CO2 at 23 and a normal anion 

gap of 7, creatinine has improved significantly to 1.26, CK only mildly 

elevated at 416, CK-MB normal, urinalysis shows large blood but only 6 RBCs, 

this is likely myoglobinuria related to his multiple seizures.  Urine drug 

screen is negative, alcohol, acetaminophen and salicylates are all undetectable

, Keppra level is pending.





Every time I tried to talk to the patient he will open his eyes look at me and 

then closes eyes and fall back asleep, at most he will mumble.  Nursing just 

came and told me that he was standing at the side of the bed and then peed all 

over the floor, states that he was asking for something to eat and drink and 

when they told him that they would have to wait until they had asked me was 

when he proceeded to urinate all over the floor with good coordination.  When I 

walked into the room the patient once again open his eyes and then close them 

again and would not wake up for me.  When I attempted to talk to him multiple 

times and he only mumbled I then told him that he would be discharged home at 

this point he opened his eyes and said very clearly "but I am hungry I want 

some food" at this point I feel that the patient is neurologically intact and 

that he is intentionally misleading me by pretending to be asleep when I tried 

to talk to him.  Nursing has witnessed him standing and walking on multiple 

occasions without any difficulty.  Patient will be discharged home, patient was 

counseled to take his medication as directed, follow-up with a neurologist, he 

was given a referral to Dr. Carranza and discharged to home. (JUAN SCHULTZ)





- Vital Signs


Vital signs: 


 











Temp Pulse Resp BP Pulse Ox


 


 97.8 F   87   16   158/98 H  98 


 


 10/09/18 18:03  10/09/18 18:03  10/09/18 18:03  10/09/18 18:03  10/09/18 18:03














- Laboratory


Laboratory results interpreted by me: 


 











  10/09/18 10/09/18 10/09/18





  11:40 11:40 11:40


 


WBC  11.2 H  


 


Chloride   109 H 


 


Carbon Dioxide   10 L* 


 


Anion Gap   25 H 


 


Creatinine   1.53 H 


 


Est GFR (Non-Af Amer)   53 L 


 


Glucose   117 H 


 


Direct Bilirubin   0.5 H 


 


Creatine Kinase   


 


Total Protein   8.7 H 


 


Urine Protein   


 


Urine Blood   


 


Salicylates    < 1.0 L


 


Acetaminophen    < 10 L














  10/09/18 10/09/18 10/09/18





  12:20 14:48 15:55


 


WBC   


 


Chloride    111 H


 


Carbon Dioxide   


 


Anion Gap   


 


Creatinine    1.26 H


 


Est GFR (Non-Af Amer)   


 


Glucose   


 


Direct Bilirubin   


 


Creatine Kinase   416 H 


 


Total Protein   


 


Urine Protein  30 H  


 


Urine Blood  LARGE H  


 


Salicylates   


 


Acetaminophen   














Discharge





<CHANTAL TERAN - Last Filed: 10/09/18 14:55>





<JUAN SCHULTZ - Last Filed: 10/09/18 18:52>





- Discharge


Clinical Impression: 


 Seizure, Noncompliance w/medication treatment due to intermit use of medication

, Alcoholic ketosis





Condition: Stable


Disposition: HOME, SELF-CARE


Additional Instructions: 


You have not been taking your seizure medications as prescribed.  I can tell 

this from looking at the bottle of medication that you have at the bedside and 

how many pills are left.  You have too many pills left for when this was 

prescribed and filled.  It is very important that you take your medications as 

prescribed.





Do not drink alcohol, this can increase your risk of having seizures.





You may not drive for at least the next year until you have been seizure-free 

for 1 year or until you are cleared by neurologist.  Please follow-up with a 

neurologist within the next week.





You had some signs of kidney damage on your examination today, this improved 

with hydration however you will need to have these rechecked by a primary care 

physician within the next week to make sure it continues improving.


Referrals: 


BRENNON CARRANZA MD [NO LOCAL MD] - Follow up in 3-5 days


JOE TALBERT MD [ACTIVE STAFF] - Follow up in 1 week


Scribe Attestation: 





10/09/18 18:51


I personally performed the services described in the documentation, reviewed 

and edited the documentation which was dictated to the scribe in my presence, 

and it accurately records my words and actions. (JUAN SCHULTZ)





Scribe Documentation





- Scribe


Written by Riley:: Riley Hope, 10/9/201 13:10


acting as scribe for :: Antonio





<CHANTAL TERAN - Last Filed: 10/09/18 14:55>

## 2018-11-06 ENCOUNTER — HOSPITAL ENCOUNTER (EMERGENCY)
Dept: HOSPITAL 62 - ER | Age: 33
Discharge: HOME | End: 2018-11-06
Payer: MEDICARE

## 2018-11-06 VITALS — SYSTOLIC BLOOD PRESSURE: 160 MMHG | DIASTOLIC BLOOD PRESSURE: 97 MMHG

## 2018-11-06 DIAGNOSIS — Z91.14: ICD-10-CM

## 2018-11-06 DIAGNOSIS — G40.909: Primary | ICD-10-CM

## 2018-11-06 DIAGNOSIS — F17.210: ICD-10-CM

## 2018-11-06 LAB
ADD MANUAL DIFF: NO
ALBUMIN SERPL-MCNC: 4.9 G/DL (ref 3.5–5)
ALP SERPL-CCNC: 82 U/L (ref 38–126)
ALT SERPL-CCNC: 13 U/L (ref 21–72)
ANION GAP SERPL CALC-SCNC: 10 MMOL/L (ref 5–19)
ANION GAP SERPL CALC-SCNC: 28 MMOL/L (ref 5–19)
APPEARANCE UR: CLEAR
APTT PPP: COLORLESS S
AST SERPL-CCNC: 37 U/L (ref 17–59)
BARBITURATES UR QL SCN: NEGATIVE
BASOPHILS # BLD AUTO: 0.1 10^3/UL (ref 0–0.2)
BASOPHILS NFR BLD AUTO: 0.7 % (ref 0–2)
BILIRUB DIRECT SERPL-MCNC: 0.2 MG/DL (ref 0–0.4)
BILIRUB SERPL-MCNC: 0.5 MG/DL (ref 0.2–1.3)
BILIRUB UR QL STRIP: NEGATIVE
BUN SERPL-MCNC: 7 MG/DL (ref 7–20)
BUN SERPL-MCNC: 8 MG/DL (ref 7–20)
CALCIUM: 8.5 MG/DL (ref 8.4–10.2)
CALCIUM: 9.7 MG/DL (ref 8.4–10.2)
CHLORIDE SERPL-SCNC: 111 MMOL/L (ref 98–107)
CHLORIDE SERPL-SCNC: 112 MMOL/L (ref 98–107)
CO2 SERPL-SCNC: 22 MMOL/L (ref 22–30)
CO2 SERPL-SCNC: 9 MMOL/L (ref 22–30)
EOSINOPHIL # BLD AUTO: 0.1 10^3/UL (ref 0–0.6)
EOSINOPHIL NFR BLD AUTO: 0.5 % (ref 0–6)
ERYTHROCYTE [DISTWIDTH] IN BLOOD BY AUTOMATED COUNT: 13.8 % (ref 11.5–14)
GLUCOSE SERPL-MCNC: 111 MG/DL (ref 75–110)
GLUCOSE SERPL-MCNC: 84 MG/DL (ref 75–110)
GLUCOSE UR STRIP-MCNC: NEGATIVE MG/DL
HCT VFR BLD CALC: 43.3 % (ref 37.9–51)
HGB BLD-MCNC: 14.7 G/DL (ref 13.5–17)
KETONES UR STRIP-MCNC: NEGATIVE MG/DL
LYMPHOCYTES # BLD AUTO: 1.9 10^3/UL (ref 0.5–4.7)
LYMPHOCYTES NFR BLD AUTO: 16.5 % (ref 13–45)
MCH RBC QN AUTO: 31.4 PG (ref 27–33.4)
MCHC RBC AUTO-ENTMCNC: 33.9 G/DL (ref 32–36)
MCV RBC AUTO: 93 FL (ref 80–97)
METHADONE UR QL SCN: NEGATIVE
MONOCYTES # BLD AUTO: 0.8 10^3/UL (ref 0.1–1.4)
MONOCYTES NFR BLD AUTO: 7.3 % (ref 3–13)
NEUTROPHILS # BLD AUTO: 8.7 10^3/UL (ref 1.7–8.2)
NEUTS SEG NFR BLD AUTO: 75 % (ref 42–78)
NITRITE UR QL STRIP: NEGATIVE
PCP UR QL SCN: NEGATIVE
PH UR STRIP: 5 [PH] (ref 5–9)
PLATELET # BLD: 209 10^3/UL (ref 150–450)
POTASSIUM SERPL-SCNC: 3.6 MMOL/L (ref 3.6–5)
POTASSIUM SERPL-SCNC: 3.7 MMOL/L (ref 3.6–5)
PROT SERPL-MCNC: 8.3 G/DL (ref 6.3–8.2)
PROT UR STRIP-MCNC: 100 MG/DL
RBC # BLD AUTO: 4.67 10^6/UL (ref 4.35–5.55)
SODIUM SERPL-SCNC: 143.9 MMOL/L (ref 137–145)
SODIUM SERPL-SCNC: 148.4 MMOL/L (ref 137–145)
SP GR UR STRIP: 1.01
TOTAL CELLS COUNTED % (AUTO): 100 %
URINE AMPHETAMINES SCREEN: NEGATIVE
URINE BENZODIAZEPINES SCREEN: NEGATIVE
URINE COCAINE SCREEN: NEGATIVE
URINE MARIJUANA (THC) SCREEN: NEGATIVE
UROBILINOGEN UR-MCNC: NEGATIVE MG/DL (ref ?–2)
WBC # BLD AUTO: 11.6 10^3/UL (ref 4–10.5)

## 2018-11-06 PROCEDURE — 80053 COMPREHEN METABOLIC PANEL: CPT

## 2018-11-06 PROCEDURE — 36415 COLL VENOUS BLD VENIPUNCTURE: CPT

## 2018-11-06 PROCEDURE — 85025 COMPLETE CBC W/AUTO DIFF WBC: CPT

## 2018-11-06 PROCEDURE — 81001 URINALYSIS AUTO W/SCOPE: CPT

## 2018-11-06 PROCEDURE — 80048 BASIC METABOLIC PNL TOTAL CA: CPT

## 2018-11-06 PROCEDURE — 96361 HYDRATE IV INFUSION ADD-ON: CPT

## 2018-11-06 PROCEDURE — 93010 ELECTROCARDIOGRAM REPORT: CPT

## 2018-11-06 PROCEDURE — 93005 ELECTROCARDIOGRAM TRACING: CPT

## 2018-11-06 PROCEDURE — 96374 THER/PROPH/DIAG INJ IV PUSH: CPT

## 2018-11-06 PROCEDURE — 80177 DRUG SCRN QUAN LEVETIRACETAM: CPT

## 2018-11-06 PROCEDURE — 80307 DRUG TEST PRSMV CHEM ANLYZR: CPT

## 2018-11-06 PROCEDURE — 99284 EMERGENCY DEPT VISIT MOD MDM: CPT

## 2018-11-06 NOTE — ER DOCUMENT REPORT
ED General





- General


Mode of Arrival: Ambulatory


Information source: Patient


TRAVEL OUTSIDE OF THE [place] IN LAST 30 DAYS: No





<CHANTAL TERAN - Last Filed: 11/06/18 19:59>





<JUAN SCHULTZ - Last Filed: 11/06/18 20:04>





- General


Stated Complaint: POSSIBLE SEIZURE


Time Seen by Provider: 11/06/18 15:08


Notes: 


 Patient is a 32-year-old male with a history of epilepsy presents to the 

emergency department via EMS due to a seizure. EMS states the patient's 

girlfriend called EMS after a witness saw the patient having 2 tonic clonic 

seizures. EMS states upon arrival to the scene the patient was post ictal and 

bleeding from the mouth and remained post ictal for several minutes. 





Patient is known to be non-compliant with his medications and EMS reports they 

were told the patient had taken a dose of his Keppra (500 mg 2x daily) at 5 

mins prior to their arrival. 


 (CHANTAL TERAN)





- Related Data


Allergies/Adverse Reactions: 


 





acetaminophen [From Vicodin] Allergy (Verified 05/08/18 11:39)


 


hydrocodone [From Vicodin] Allergy (Verified 05/08/18 11:39)


 











Past Medical History





- General


Information source: Patient, Emergency Med Personnel, [facility] Records





- Social History


Smoking Status: Current Every Day Smoker


Cigarette use (# per day): Yes


Chew tobacco use (# tins/day): No


Frequency of alcohol use: Social


Drug Abuse: None


Family History: None, Reviewed & Not Pertinent





- Past Medical History


Cardiac Medical History: Reports: Hx Hypertension


Neurological Medical History: Reports: Hx Seizures





- Immunizations


Immunizations up to date: No


Hx Diphtheria, Pertussis, Tetanus Vaccination: Yes





<CHANTAL TERAN - Last Filed: 11/06/18 19:59>





Review of Systems





- Review of Systems


Constitutional: No symptoms reported


EENT: No symptoms reported


Cardiovascular: No symptoms reported


Respiratory: No symptoms reported


Gastrointestinal: No symptoms reported


Genitourinary: No symptoms reported


Male Genitourinary: No symptoms reported


Musculoskeletal: No symptoms reported


Skin: No symptoms reported


Hematologic/Lymphatic: No symptoms reported


Neurological/Psychological: See HPI, Seizure


-: Yes All other systems reviewed and negative





<CHANTAL TERAN - Last Filed: 11/06/18 19:59>





<JUAN SCHULTZ - Last Filed: 11/06/18 20:04>





- Review of Systems


Notes: 





Patient is postictal.  ROS Per EMS. (CHANTAL TERAN)





Physical Exam





<CHANTAL TERAN - Last Filed: 11/06/18 19:59>





<JUAN SCHULTZ - Last Filed: 11/06/18 20:04>





- Vital signs


Vitals: 


 











Resp Pulse Ox


 


 22 H  94 


 


 11/06/18 15:15  11/06/18 15:15














- Notes


Notes: 





GENERAL: Initially actively having a non-focal,  tonic-clonic seizure at bedside

, resolves rapidly and is postictal. 


HEAD: Normocephalic, atraumatic.


EYES: Pupils equal, round, and reactive to light. Extraocular movements intact.


ENT: Oral mucosa moist, tongue midline. Small abrasions to the tongue 

consistent with bite marks. Bloody discharge from mouth.


NECK: Full range of motion. Supple. Trachea midline.


LUNGS: Sonorous  respirations consistent with postictal phase.


HEART: Tachycardic. No murmurs, gallops, or rubs.


ABDOMEN: Soft, non-tender. Non-distended. Bowel sounds present in all 4 

quadrants.


EXTREMITIES: Moves all 4 extremities spontaneously. No edema, radial and 

dorsalis pedis pulses 2/4 bilaterally. No cyanosis.


NEUROLOGICAL:Post ictal. Follows occasional commands.


SKIN: Warm, dry, normal turgor. No rashes or lesions noted.


 (CHANTAL TERAN)





Course





- Laboratory


Result Diagrams: 


 11/06/18 15:30





 11/06/18 17:37





<CHANTAL TERAN - Last Filed: 11/06/18 19:59>





- Laboratory


Result Diagrams: 


 11/06/18 15:30





 11/06/18 17:37





<JUAN SCHULTZ - Last Filed: 11/06/18 20:04>





- Re-evaluation


Re-evalutation: 





11/06/18 16:37


CBC shows slight leukocytosis at 11.6 otherwise unremarkable, CMP shows 

slightly elevated sodium of 148.4, CO2 low at 9 consistent with 2-3 seizures 

today, anion gap elevated at 28 also consistent with the seizures, these labs 

will be repeated after he has been hydrated, urinalysis shows moderate blood 

but 0 RBCs again likely mild rhabdomyolysis from the seizures.  Urine drug 

screen negative, Keppra level is a send out.


11/06/18 20:01


Blood work has normalized, anion gap is now 10, CO2 is now 22, sodium has 

declined from 148.4 down to 143.9.  Patient now wakes up uses the urinal and 

then goes back to sleep.  Patient is following his usual postictal pattern.  

Patient tends to be tired for a few hours afterwards and sleeps and then is 

stable for discharge to home.  We will continue to monitor this patient and as 

soon as he is able to ambulate on his own he will be discharged home.  He is 

Dileep been given a gram of Keppra. (JUAN SCHULTZ)





- Vital Signs


Vital signs: 


 











Temp Pulse Resp BP Pulse Ox


 


       29 H  135/94 H  100 


 


       11/06/18 17:28  11/06/18 17:28  11/06/18 17:28














- Laboratory


Laboratory results interpreted by me: 


 











  11/06/18 11/06/18 11/06/18





  14:35 15:30 15:30


 


WBC   11.6 H 


 


Absolute Neutrophils   8.7 H 


 


Sodium  148.4 H  


 


Chloride  111 H  


 


Carbon Dioxide  9 L*  


 


Anion Gap  28 H  


 


Glucose  111 H  


 


ALT  13 L  


 


Total Protein  8.3 H  


 


Urine Protein    100 H


 


Urine Blood    MODERATE H














  11/06/18





  17:37


 


WBC 


 


Absolute Neutrophils 


 


Sodium 


 


Chloride  112 H


 


Carbon Dioxide 


 


Anion Gap 


 


Glucose 


 


ALT 


 


Total Protein 


 


Urine Protein 


 


Urine Blood 














- EKG Interpretation by Me


Additional EKG results interpreted by me: 





11/06/18 20:01


EKG shows sinus rhythm at a rate of 95, normal axis, prolonged QT interval at 

392, QTc corrected is 493, there is LVH, no ST segment elevation, there is mild 

depression in 2 and aVF per my interpretation. (JUAN SCHULTZ)





Discharge





<CHANTAL TERAN - Last Filed: 11/06/18 19:59>





<JUAN SCHULTZ - Last Filed: 11/06/18 20:04>





- Discharge


Clinical Impression: 


 Seizure, Noncompliance w/medication treatment due to intermit use of medication





Condition: Stable


Disposition: HOME, SELF-CARE


Additional Instructions: 


It is incredibly important that you take your Keppra 1 tablet twice a way day 

as directed.  Whenever you skip your Keppra you have seizures.  We will not 

know whether or not to add another medication until you have been taking your 

Keppra every day twice a day as directed for several months.





Please follow-up with a neurologist as an outpatient.





You may not drive until you have been seizure-free for at least a year and 

cleared by neurologist.


Referrals: 


BRENNON CARRANZA MD [NO LOCAL MD] - Follow up as needed


Scribe Attestation: 





11/06/18 20:03


I personally performed the services described in the documentation, reviewed 

and edited the documentation which was dictated to the scribe in my presence, 

and it accurately records my words and actions. (JUAN SCHULTZ)





Scribe Documentation





- Scribe


Written by Scribe:: Riley Hope, 11/6/2018 15:42


acting as scribe for :: Antonio





<CHANTAL TERAN - Last Filed: 11/06/18 19:59>

## 2018-12-22 NOTE — EKG REPORT
SEVERITY:- BORDERLINE ECG -

SINUS RHYTHM

PROBABLE LEFT ATRIAL ABNORMALITY

:

Confirmed by: Alvin Fountain MD 22-Dec-2018 11:38:35

## 2018-12-22 NOTE — RADIOLOGY REPORT (SQ)
EXAM DESCRIPTION:  CHEST SINGLE VIEW



COMPLETED DATE/TIME:  12/22/2018 9:23 am



REASON FOR STUDY:  concern for aspiration



COMPARISON:  5/8/2018



EXAM PARAMETERS:  NUMBER OF VIEWS: One view.

TECHNIQUE: Single frontal radiographic view of the chest acquired.

RADIATION DOSE: NA

LIMITATIONS: None.



FINDINGS:  LUNGS AND PLEURA: No opacities, masses or pneumothorax. No pleural effusion.

MEDIASTINUM AND HILAR STRUCTURES: No masses.  Contour normal.

HEART AND VASCULAR STRUCTURES: Heart normal in size.  Normal vasculature.

BONES: No acute findings.

HARDWARE: None in the chest.

OTHER: No other significant finding.



IMPRESSION:  NO ACUTE RADIOGRAPHIC FINDING IN THE CHEST.



TECHNICAL DOCUMENTATION:  JOB ID:  [id number]

 2011 [employer]- All Rights Reserved



Reading location - IP/workstation name: [email or web address]

## 2018-12-22 NOTE — ER DOCUMENT REPORT
ED General





- General


Stated Complaint: POSSIBLE SEIZURE


Time Seen by Provider: 12/22/18 08:32


Mode of Arrival: Medic


Information source: Patient, Emergency Med Personnel


TRAVEL OUTSIDE OF THE [place] IN LAST 30 DAYS: No





- HPI


Patient complains to provider of: Seizure


Onset: Just prior to arrival - 32-year-old man with a history of epilepsy and 

recurrent seizures in the past who is previously been treated with Keppra who 

also has a history of alcoholism and recurrent seizures when using alcohol that 

presents for evaluation of 2 seizures this morning witnessed at home.  His 

girlfriend notes that he began to have a seizure this morning because he has 

been drinking heavily for the last 3 days at home similar to previous episodes 

of seizures she said she does not believe he has been taking his Keppra either. 

Patient is postictal limiting his history.





- Related Data


Allergies/Adverse Reactions: 


                                        





acetaminophen [From Vicodin] Allergy (Verified 05/08/18 11:39)


   


hydrocodone [From Vicodin] Allergy (Verified 05/08/18 11:39)


   











Past Medical History





- General


Information source: Patient, Emergency Med Personnel, [facility] Records


Cannot obtain history due to: Altered mental status





- Social History


Smoking Status: Current Every Day Smoker


Smoking Education Provided: Yes


Frequency of alcohol use: Heavy


Drug Abuse: None


Family History: None, Reviewed & Not Pertinent





- Past Medical History


Cardiac Medical History: Reports: Hx Hypertension


Neurological Medical History: Reports: Hx Seizures


Renal/ Medical History: Denies: Hx Peritoneal Dialysis





- Immunizations


Immunizations up to date: No


Hx Diphtheria, Pertussis, Tetanus Vaccination: Yes





Review of Systems





- Review of Systems


-: Yes All other systems reviewed and negative





Physical Exam





- Vital signs


Vitals: 


                                        











Resp


 


 26 H


 


 12/22/18 08:38














- General


General appearance: Lethargic


In distress: Moderate





- HEENT


Head: Normocephalic


Eyes: Normal


Conjunctiva: Normal


Cornea: Normal


Extraocular movements intact: Yes


Pupils: PERRL


Ears: Normal


Mouth/Lips: Other - Bite marks on both sides of tongue





- Respiratory


Respiratory status: No respiratory distress


Chest status: Nontender


Breath sounds: Normal


Chest palpation: Normal





- Cardiovascular


Rhythm: Regular


Heart sounds: Normal auscultation


Murmur: No





- Abdominal


Inspection: Normal


Distension: No distension


Bowel sounds: Normal


Tenderness: Nontender


Organomegaly: No organomegaly





- Back


Back: Normal, Nontender





- Extremities


General upper extremity: Normal inspection, Nontender, Normal color, Normal ROM,

Normal temperature


General lower extremity: Normal inspection, Nontender, Normal color, Normal ROM,

Normal temperature, Normal weight bearing.  No: Nati's sign





- Neurological


Cognition: Confused


Orientation: Disoriented to person


[place] Coma Scale Eye Opening: To Voice


[place] Coma Scale Verbal: Confused


[place] Coma Scale Motor: Localizes to Pain


[place] Coma Scale Total: 12


Cranial nerves: Normal


Motor strength normal: LUE, RUE, LLE, RLE





- Psychological


Associated symptoms: Flat affect





Course





- Re-evaluation


Re-evalutation: 





32-year-old man that presents for history of seizures.


Initially on arrival he was postictal having recently had a seizure while at 

home.


Per family this patient has been on a binge drinking episode over the last 3 

days which is a trigger for his seizures.


His history is limited secondary to his postictal state.


Upon arrival in the emergency department this patient had a recurrence of 

seizure, he bit his tongue, briefly had a desaturation, was emergently placed on

nonrebreather had suctioning at the bedside aggressively.


He was given Valium intravenously 5 mg.


He was given 1500 of Keppra IV immediately loading.


Is placed on cardiac monitor and kept on nonrebreather for an hour thereafter 

with frequent reassessments at the bedside.


Following this episode this patient was somnolent however and a nonfocal 

neurologic examination and slowly cleared.


I spoke to his girlfriend Lucia phone #[phone].


She notes that this is been a recurrent issue for him and he does not take his 

antiepileptic medication.


As a gentleman begin to clear he denied any current complaints other than 

feeling somewhat cold.


He was observed in the emergency department for approximately 5 hours without 

any return of seizures.


He had a Keppra level drawn and sent.


The importance of the use of his antiepileptic medications as well as the 

importance of alcohol cessation was reiterated to both he as well as his gi

rlfriend's daughters who are helping take care of him Kaushik Mar.


Patient was discharged in the care of his family.








- Vital Signs


Vital signs: 


                                        











Temp Pulse Resp BP Pulse Ox


 


 101.3 F H  81   12   180/128 H  97 


 


 12/22/18 16:22  12/22/18 16:22  12/22/18 16:22  12/22/18 16:22  12/22/18 16:22














- Laboratory


Result Diagrams: 


                                 12/22/18 09:00





                                 12/22/18 09:00


Laboratory results interpreted by me: 


                                        











  12/22/18 12/22/18





  09:00 09:00


 


WBC  12.6 H 


 


Sodium   145.8 H


 


Chloride   110 H


 


Carbon Dioxide   9 L*


 


Anion Gap   27 H


 


Creatinine   1.28 H


 


Glucose   128 H


 


Creatine Kinase   608 H














Discharge





- Discharge


Clinical Impression: 


 Seizure, Alcohol abuse, Tongue biting





Condition: Good


Disposition: HOME, SELF-CARE


Instructions:  Acute Alcohol Intoxication ([facility]), Seizure, Known Epileptic ([facility])


Additional Instructions: 


Your seen today in the emergency department for your seizure.


You should avoid alcohol as it is a trigger for your seizures.


You must take your Keppra, if you missed doses of your Keppra you will have 

seizures again.


If you have a seizure like this again it is possible that it will permanently 

damage your brain or potentially kill you.


Today received doses of Keppra in the emergency department, you had blood tests 

performed as well as a chest x-ray.


These look like they have looked previously.


Prescriptions: 


Levetiracetam [Keppra 500 mg Tablet] 1,000 mg PO Q12 #120 tablet

## 2019-01-03 NOTE — RADIOLOGY REPORT (SQ)
EXAM DESCRIPTION:  CT HEAD WITHOUT



COMPLETED DATE/TIME:  1/3/2019 8:27 am



REASON FOR STUDY:  siezures, combative, ams



COMPARISON:  CT brain 8/25/2018, 5/1/2014



TECHNIQUE:  Axial images acquired through the brain without intravenous contrast.  Images reviewed wi
th bone, brain and subdural windows.  Additional sagittal and coronal reconstructions were generated.
 Images stored on PACS.

All CT scanners at this facility use dose modulation, iterative reconstruction, and/or weight based d
osing when appropriate to reduce radiation dose to as low as reasonably achievable (ALARA).

[facility]: Dose Right  [facility]: CareDose    [facility]: Dose Right    [facility]: Teradose 4D    [facility]: Smart Technologies



RADIATION DOSE:  CT Rad equipment meets quality standard of care and radiation dose reduction techniq
ues were employed. CTDIvol: 53.2 mGy. DLP: 1177 mGy-cm. mGy.



LIMITATIONS:  None.



FINDINGS:  VENTRICLES: Normal size and contour.

CEREBRUM: No masses.  No hemorrhage.  No midline shift.  No evidence for acute infarction. Normal gra
y/white matter differentiation. No areas of low density in the white matter.

CEREBELLUM: No masses.  No hemorrhage.  No alteration of density.  No evidence for acute infarction.

EXTRAAXIAL SPACES: No fluid collections.  No masses.

ORBITS AND GLOBE: No intra- or extraconal masses.  Normal contour of globe without masses.

CALVARIUM: No fracture.

PARANASAL SINUSES: There are air-fluid levels in the right and left maxillary sinuses, air and fluid 
in the ethmoid air cells likely representing pre-existing sinus disease.

SOFT TISSUES: No mass or hematoma.

OTHER: Endotracheal tube, orogastric tube in place.  Oral airway in place.



IMPRESSION:  No acute intracranial changes

EVIDENCE OF ACUTE STROKE: NO.



COMMENT:  Quality ID # [id number]: Final reports with documentation of one or more dose reduction techniques
 (e.g., Automated exposure control, adjustment of the mA and/or kV according to patient size, use of 
iterative reconstruction technique)



TECHNICAL DOCUMENTATION:  JOB ID:  [id number]

 2011 [employer]- All Rights Reserved



Reading location - IP/workstation name: [facility]-RR

## 2019-01-03 NOTE — RADIOLOGY REPORT (SQ)
EXAM DESCRIPTION:  CHEST SINGLE VIEW



COMPLETED DATE/TIME:  1/3/2019 8:32 am



REASON FOR STUDY:  post intubation



COMPARISON:  AP chest 12/22/2018



EXAM PARAMETERS:  NUMBER OF VIEWS: One view.

TECHNIQUE: Single frontal radiographic view of the chest acquired.

RADIATION DOSE: NA

LIMITATIONS: None.



FINDINGS:  LUNGS AND PLEURA: No opacities, masses or pneumothorax. No pleural effusion.

MEDIASTINUM AND HILAR STRUCTURES: No masses.  Contour normal.

HEART AND VASCULAR STRUCTURES: Heart normal in size.  Normal vasculature.

BONES: No acute findings.

HARDWARE: Endotracheal tube tip 5 cm above the sandra.  Nasogastric tube tip and side port in the sto
mach.

OTHER: No other significant finding.



IMPRESSION:  Endotracheal tube, nasogastric tubes in good positioning.

No focal infiltrates.



TECHNICAL DOCUMENTATION:  JOB ID:  [id number]

 2011 [employer]- All Rights Reserved



Reading location - IP/workstation name: [email or web address]

## 2019-01-03 NOTE — ER DOCUMENT REPORT
ED General





- General


Chief Complaint: Seizure


Stated Complaint: POSSIBLE SEIZURE


Time Seen by Provider: 01/03/19 06:11


Notes: 





Patient is a 33-year-old male with epilepsy that presents to the emergency 

department for chief complaint of seizure.  Patient history obtained from EMS, 

patient reportedly had 2 seizures at home, one lasting 2 minutes this morning, 

and another lasting approximately 5 minutes, this was witnessed by his 

girlfriend.  He does have epilepsy, but is noncompliant with his medications, he

is supposed to be on Keppra, the last several times he has been seen, Keppra 

levels were sent, and were completely negative, he has not been taking 

medications despite being given multiple prescriptions for this.  No other 

history obtained at this time as the patient is postictal.





Past Medical History: Epilepsy


Past Surgical History: Not obtainable at this time


Social History: Smokes cigarettes, no apparent alcohol use or illicit drug use.


Family History: Reviewed and noncontributory for presenting illness


Allergies: Reviewed, see documented allergy list. 





REVIEW OF SYSTEMS:


Complete review of systems is not obtainable at this time as the patient is 

postictal, and not able to answer all questions.





PHYSICAL EXAMINATION:





Vital signs reviewed, nursing noted reviewed. 





GENERAL: Somnolent, but arousable, will answer questions, current GCS is 13 





HEAD: Atraumatic, normocephalic.





EYES: Pupils dilated, but equally reactive, extraocular movements intact, sclera

anicteric, conjunctiva are normal.





ENT: nares patent, oropharynx clear without exudates.  Moist mucous membranes.  

Right-sided tongue laceration





NECK: Normal range of motion, supple without lymphadenopathy





LUNGS: Breath sounds clear to auscultation bilaterally and equal.  No wheezes 

rales or rhonchi.





HEART: Regular rate and rhythm without murmurs





ABDOMEN: Soft, nontender, normoactive bowel sounds.  No rebound, guarding, or 

rigidity. No masses appreciated.





EXTREMITIES: nontender, good range of motion, no pitting or edema.  There was 

noted to be a fluctuant area over the patient's left lower leg, nontender no 

induration, possible seroma, no erythema or warmth either.





NEUROLOGICAL: GCS 13, no focal neurological deficits. Moves all extremities sp

ontaneously Motor and sensory grossly intact on exam.





PSYCH: Postictal, arousable.





SKIN: Warm, Dry, normal turgor, no rashes or lesions noted on exposed skin





TRAVEL OUTSIDE OF THE [place] IN LAST 30 DAYS: No





- Related Data


Allergies/Adverse Reactions: 


                                        





acetaminophen [From Vicodin] Allergy (Verified 05/08/18 11:39)


   


hydrocodone [From Vicodin] Allergy (Verified 05/08/18 11:39)


   











Past Medical History





- Social History


Smoking Status: Unknown if Ever Smoked


Family History: None, Reviewed & Not Pertinent


Patient has suicidal ideation: No


Patient has homicidal ideation: No





- Past Medical History


Cardiac Medical History: Reports: Hx Hypertension


Neurological Medical History: Reports: Hx Seizures


Renal/ Medical History: Denies: Hx Peritoneal Dialysis





- Immunizations


Immunizations up to date: No


Hx Diphtheria, Pertussis, Tetanus Vaccination: Yes





Physical Exam





- Vital signs


Vitals: 


                                        











Resp


 


 15 


 


 01/03/19 05:38














Course





- Re-evaluation


Re-evalutation: 





Patient seen and examined vital signs reviewed. 





Laboratory data and imaging were ordered as appropriate for the patient's 

presenting symptoms and complaint, with consideration of any critical or life 

threatening conditions that may be associated with their obtained history and 

exam as noted above.





Patient was treated with IV Keppra 1000 mg, but prior to administration of IV 

Keppra, the patient had a third seizure for the day, that lasted about 1 minute,

IV Keppra was infused, patient was postictal again, then patient became very 

agitated and appeared to have tonic-clonic activity again, then continued to be 

agitated, was flailing around in the bed, not redirectable, and speaking 

nonsensically, he was kicking the bed, and falling out of the bed, and an 

immediate risk to himself and also staff, patient was given IV Ativan 2 mg, 

which did calm him down temporarily, but he was still agitated, at this point I 

decided to intubate the patient for his own protection, protection of other 

staff, as he was a immediate threat to himself and others.  I also felt that 

given that the patient has had multiple seizures today, up to 4, that he is 

going to status epilepticus, patient was started on IV propofol after successful

intubation, no further tonic-clonic activity.





Results were reviewed when available and demonstrated mild metabolic acidosis 

consistent with seizure, mildly elevated CK as well, his creatinine was elevated

at 1.8, when compared to prior labs, this is a mild acute kidney injury, he was 

at 1.4 in the past.





Patient was additionally given IV fluids for his AK I.





The patient was re-evaluated and was stable on the ventilator.  CT of the head 

obtained and negative, chest x-ray demonstrated good positioning of ET tube and 

OG tube.





Evaluation was most consistent with status epilepticus, AK I, metabolic aci

dosis, acute respiratory failure, metabolic encephalopathy





Results were discussed with the patients mother at this point after careful 

consideration I feel that that patient should be transferred to [facility], discussed with Dr. Silver due to status epilepticus, need for 

neurological evaluation and management.  This was discussed with the patient's 

mother that it is in the best interest for their care to be transferred, the 

risks and benefits of transfer were discussed, including but not limited to 

clinical deterioration during transport, respiratory distress, and potential for

traumatic injuries. Patients mother agreed with this plan of care. 





*Note is created using voice recognition software and may contain spelling, 

syntax or grammatical errors.








Laboratory











  01/03/19 01/03/19 01/03/19





  05:40 05:40 07:20


 


WBC  7.5  


 


RBC  4.58  


 


Hgb  14.4  


 


Hct  41.9  


 


MCV  91  


 


MCH  31.4  


 


MCHC  34.4  


 


RDW  12.6  


 


Plt Count  300  


 


Seg Neutrophils %  51.6  


 


Lymphocytes %  35.1  


 


Monocytes %  11.5  


 


Eosinophils %  0.9  


 


Basophils %  0.9  


 


Absolute Neutrophils  3.9  


 


Absolute Lymphocytes  2.6  


 


Absolute Monocytes  0.9  


 


Absolute Eosinophils  0.1  


 


Absolute Basophils  0.1  


 


Sodium   143.6 


 


Potassium   4.3 


 


Chloride   107 


 


Carbon Dioxide   20 L 


 


Anion Gap   17 


 


BUN   15 


 


Creatinine   1.48 H 


 


Est GFR ( Amer)   > 60 


 


Est GFR (Non-Af Amer)   54 L 


 


Glucose   103 


 


POC Glucose    107


 


Calcium   9.6 


 


Total Bilirubin   0.2 


 


Direct Bilirubin   0.2 


 


Neonat Total Bilirubin   Not Reportable 


 


Neonat Direct Bilirubin   Not Reportable 


 


Neonat Indirect Bili   Not Reportable 


 


AST   25 


 


ALT   15 L 


 


Alkaline Phosphatase   82 


 


Creatine Kinase   402 H 


 


Total Protein   7.9 


 


Albumin   4.2 


 


Urine Color   


 


Urine Appearance   


 


Urine pH   


 


Ur Specific Gravity   


 


Urine Protein   


 


Urine Glucose (UA)   


 


Urine Ketones   


 


Urine Blood   


 


Urine Nitrite   


 


Urine Bilirubin   


 


Urine Urobilinogen   


 


Ur Leukocyte Esterase   


 


Urine WBC (Auto)   


 


Urine RBC (Auto)   


 


Urine Mucus (Auto)   


 


Urine Ascorbic Acid   














  01/03/19





  09:20


 


WBC 


 


RBC 


 


Hgb 


 


Hct 


 


MCV 


 


MCH 


 


MCHC 


 


RDW 


 


Plt Count 


 


Seg Neutrophils % 


 


Lymphocytes % 


 


Monocytes % 


 


Eosinophils % 


 


Basophils % 


 


Absolute Neutrophils 


 


Absolute Lymphocytes 


 


Absolute Monocytes 


 


Absolute Eosinophils 


 


Absolute Basophils 


 


Sodium 


 


Potassium 


 


Chloride 


 


Carbon Dioxide 


 


Anion Gap 


 


BUN 


 


Creatinine 


 


Est GFR ( Amer) 


 


Est GFR (Non-Af Amer) 


 


Glucose 


 


POC Glucose 


 


Calcium 


 


Total Bilirubin 


 


Direct Bilirubin 


 


Neonat Total Bilirubin 


 


Neonat Direct Bilirubin 


 


Neonat Indirect Bili 


 


AST 


 


ALT 


 


Alkaline Phosphatase 


 


Creatine Kinase 


 


Total Protein 


 


Albumin 


 


Urine Color  STRAW


 


Urine Appearance  CLEAR


 


Urine pH  6.0


 


Ur Specific Esmond  1.011


 


Urine Protein  NEGATIVE


 


Urine Glucose (UA)  NEGATIVE


 


Urine Ketones  NEGATIVE


 


Urine Blood  NEGATIVE


 


Urine Nitrite  NEGATIVE


 


Urine Bilirubin  NEGATIVE


 


Urine Urobilinogen  NEGATIVE


 


Ur Leukocyte Esterase  NEGATIVE


 


Urine WBC (Auto)  2


 


Urine RBC (Auto)  1


 


Urine Mucus (Auto)  RARE


 


Urine Ascorbic Acid  NEGATIVE











                                        





Chest X-Ray  01/03/19 07:35


IMPRESSION:  Endotracheal tube, nasogastric tubes in good positioning.


No focal infiltrates.


 








Head CT  01/03/19 07:35


IMPRESSION:  No acute intracranial changes


EVIDENCE OF ACUTE STROKE: NO.


 














01/03/19 20:27








- Vital Signs


Vital signs: 


                                        











Temp Pulse Resp BP Pulse Ox


 


 99.3 F      12   169/116 H  100 


 


 01/03/19 05:47     01/03/19 10:08  01/03/19 10:08  01/03/19 10:08














- Laboratory


Result Diagrams: 


                                 01/03/19 05:40





                                 01/03/19 05:40


Laboratory results interpreted by me: 


                                        











  01/03/19





  05:40


 


Carbon Dioxide  20 L


 


Creatinine  1.48 H


 


Est GFR (Non-Af Amer)  54 L


 


ALT  15 L


 


Creatine Kinase  402 H














- EKG Interpretation by Me


Additional EKG results interpreted by me: 





EKG demonstrates sinus rhythm with a ventricular rate of 80 bpm, normal axis, 

normal intervals, there is J-point elevation noted in leads V2 and V3, no 

ischemic changes.  This is compared with prior EKG from 12/22/2018








Procedures





- Intubation


  ** Orotracheal


Airway evaluation: Normal anatomy


Mallampati Classification: Class 1


Medications: Etomidate - 20mg, Other - rocuronium 75mg


Intubation method: Orotracheal


Blade size: 4


Equipment used: Glidescope


ETT size: 8.0


ETT secured at: Teeth


ETT secured at (cm): 23


Breath Sounds after Intubation: Equal


End tidal CO2 confirmed: Yes


Ventilator settings: SIMV


Tidal volume: 500


FiO2: 40


Respirations: 12


PEEP: 5


Post Intubation Xray: Yes


Intubation Complications: No complications





Critical Care Note





- Critical Care Note


Total time excluding time spent on procedures (mins): 60


Comments: 





Critical care time  60 minutes exclusive from separate billable procedures for a

patient requiring complex medical decision making, and high potential for 

clinical deterioration.  In a patient with status epilepticus, requiring 

intubation, and close monitoring, high potential for deterioration. Time spent 

obtaining history from patient or surrogate, discussions with consultants, 

development of treatment plan with patient or surrogate, evaluation of patient's

response to treatment, examination of patient, ordering and performing 

treatments and interventions, ordering and review of laboratory studies, re-

evaluation of patient's condition, ordering and review of radiographic studies 

and review of old charts





Discharge





- Discharge


Clinical Impression: 


 Status epilepticus, Metabolic acidosis, MARKIE (acute kidney injury)





Acute respiratory failure


Qualifiers:


 Respiratory failure complication: unspecified whether with hypoxia or 

hypercapnia Qualified Code(s): J96.00 - Acute respiratory failure, unspecified 

whether with hypoxia or hypercapnia





Condition: Stable


Disposition: [facility]

## 2019-01-03 NOTE — EKG REPORT
SEVERITY:- ABNORMAL ECG -

SINUS RHYTHM

CONSIDER LEFT VENTRICULAR HYPERTROPHY

ST ELEV, PROBABLE NORMAL EARLY REPOL PATTERN

:

Confirmed by: Nathaly Wood MD 03-Jan-2019 13:39:11

## 2019-01-25 NOTE — ER DOCUMENT REPORT
ED General





- General


Chief Complaint: Seizure


Stated Complaint: POSSIBLE SEIZURE


Time Seen by Provider: 01/25/19 10:46


Primary Care Provider: 


BRENNON CARRANZA MD [NO LOCAL MD] - Follow up as needed


TRAVEL OUTSIDE OF THE [place] IN LAST 30 DAYS: No





- HPI


Notes: 





Patient is a 33-year-old male with a history of seizure disorder and 

noncompliance with a history of alcoholism who presents to the emergency 

department by EMS for a seizure prior to arrival who was then found to be 

postictal at that time.  Pt was drinking alcohol this morning which is a known 

trigger for his seizures (witnessed intake).  Patient states that he has been 

eating and drinking without difficulty.  He is urinating normally and having 

normal bowel movements.  Patient states he has not been drinking recently.  

Patient states that he has been taking his Keppra as prescribed.  Patient states

that aside from feeling cold and weak this is his typical after a seizure.  He 

was intubated and sent to [facility] earlier this month for recurrent seizures.  No 

other concerns or complaints at this time.  Denies any headache, fever, neck 

pain, URI, sore throat, chest pain, palpitations, syncope, cough, shortness of 

breath, wheeze, dyspnea, abdominal pain, nausea/vomiting/diarrhea, urinary 

retention, dysuria, hematuria, loss of control of bowel or bladder, 

numbness/tingling, saddle anesthesia, muscle paralysis/weakness, or rash.





- Related Data


Allergies/Adverse Reactions: 


                                        





acetaminophen [From Vicodin] Allergy (Verified 05/08/18 11:39)


   


hydrocodone [From Vicodin] Allergy (Verified 05/08/18 11:39)


   











Past Medical History





- Social History


Smoking Status: Current Every Day Smoker


Family History: None, Reviewed & Not Pertinent





- Past Medical History


Cardiac Medical History: Reports: Hx Hypertension


Neurological Medical History: Reports: Hx Seizures


Renal/ Medical History: Denies: Hx Peritoneal Dialysis





- Immunizations


Immunizations up to date: No


Hx Diphtheria, Pertussis, Tetanus Vaccination: Yes





Review of Systems





- Review of Systems


-: Yes All other systems reviewed and negative





Physical Exam





- Vital signs


Vitals: 


                                        











Temp Resp


 


 98.6 F   22 H


 


 01/25/19 10:39  01/25/19 10:39














- Notes


Notes: 





PHYSICAL EXAMINATION:  





GENERAL: Well-appearing, well-nourished and in no acute distress.  A&Ox4.  

Answers questions appropriately.  Pt is somnolent but otherwise cooperative.





HEAD: Atraumatic, normocephalic.  Non-tender. 





EYES: Pupils equal round and reactive to light, extraocular movements intact, 

sclera anicteric, conjunctiva are normal.  No nystagmus.  vis fields intact.





ENT: EAC clear b/l.  TM's intact b/l without erythema, fluid, or perforation.  

Nares patent and without discharge.  oropharynx clear without exudates.  No ton

silar hypertrophy or erythema.  Moist mucous membranes.  No sinus tenderness. 





NECK: Normal range of motion, supple without lymphadenopathy.  No 

rigidity/meningismus.  No midline tenderness. 





LUNGS: Breath sounds clear to auscultation bilaterally and equal.  No wheezes 

rales or rhonchi.





HEART: Regular rate and rhythm without murmurs, rubs, gallops.





ABDOMEN: Soft, nontender, nondistended abdomen.  No guarding, no rebound.  

Normal bowel sounds present.  No CVA tenderness bilaterally.  





Musculoskeletal: Ext's b/l:  FROM to passive/active. Strength 5+/5.  No deficits

noted.  No bony tenderness of extremities.





Extremities:  No cyanosis, clubbing, or edema b/l.  Peripheral pulses 2+.  

Capillary refill less than 2 seconds.





NEUROLOGICAL: NIH 0.  GCS 15.  Cranial nerves grossly intact.  Normal speech.  

Normal sensory, motor exams.  Reflexes 2+ b/l. SARA's negative.  Pronator drift 

negative. Heel/shin, finger/nose wnl.





PSYCH: flat





SKIN: Warm, Dry, normal turgor, no rashes or lesions noted.





Course





- Re-evaluation


Re-evalutation: 





01/25/19 11:02


Reviewed with Dr. Isaac.


Pt is somewhat somnolent but appropriately responsive. 


Pt has a seizure history.


We will give keppra and fluids. 


Discharge if pt stable thereafter.





01/25/19 13:10


Patient is an afebrile, well-hydrated 33-year-old male who presents to the 

emergency department with seizure and known epileptic.  Patient has returned to 

baseline.  Vitals are acceptable without significant tachycardia, tachypnea, or 

hypoxia.  PE is otherwise unremarkable for any focal neurological deficits.  

Patient is nontoxic-appearing and is tolerating p.o. without difficulty.  He is 

responding appropriately.  Patient does have a Keppra level pending.  He did 

receive fluids as well as IV Keppra.  No further labs or imaging warranted at 

this time.  Low suspicion for any acute intracranial process, sepsis, 

meningitis, severe dehydration, respiratory compromise, or other systemic 

emergent condition at this time.  Patient is aware that condition can change 

from initial presentation and he needs to monitor symptoms closely and seek 

medical attention with any acute changes.  Recheck with your PCM/neurologist in 

2-3 days.  Return to the ED with any other worsening/concerning symptoms as 

reviewed.  Patient is in agreement.





- Vital Signs


Vital signs: 


                                        











Temp Pulse Resp BP Pulse Ox


 


 98.6 F      14   159/109 H  100 


 


 01/25/19 10:39     01/25/19 13:22  01/25/19 13:22  01/25/19 12:01














Discharge





- Discharge


Clinical Impression: 


 Seizure





Condition: Stable


Disposition: HOME, SELF-CARE


Instructions:  Seizure, Known Epileptic ([facility])


Additional Instructions: 


AVOID ALCOHOL!


Take your medicines as prescribed


Rest, Ice/cool compress


Tylenol/ibuprofen as needed


Light stretches daily


Strength exercises as able


Moist heat and massage may help


F/u with your PCP/Neurology in 2-3 days for a recheck





Return to the ED with any worsening symptoms and/or development of fever, 

headache, changes in behavior/mentation/vision/speech, chest pain, palpitations,

syncope, shortness of breath, trouble breathing, abdominal pain, n/v/d, blood in

stool/urine, loss of control of bowel/bladder, urinary retention, muscle 

weakness/paralysis, saddle anesthesia, numbness/tingling, or other worsening 

symptoms that are concerning to you.


Forms:  Elevated Blood Pressure


Referrals: 


BRENNON CARRANZA MD [NO LOCAL MD] - Follow up as needed

## 2019-02-16 NOTE — ER DOCUMENT REPORT
ED General





- General


Chief Complaint: Seizure


Stated Complaint: SEIZURES


Time Seen by Provider: 02/16/19 06:49


TRAVEL OUTSIDE OF THE [place] IN LAST 30 DAYS: No





- HPI


Patient complains to provider of: Seizure


Notes: 





Patient coming in for evaluation of a seizure.  According EMS had a seizure 

while in route.  Patient has been out drinking the night prior and apparently 

had been noncompliant with his medications.  Patient has a history of 

noncompliance with breakthrough seizures in the past.  During transport was 

given Versed upon my evaluation patient has a nasal trumpet in his right nare 

will open his eyes to painful stimuli will follow some commands does have a gag 

in place.  Otherwise patient's sedated.  The current seizure-like activity 

because of sedation medications given by EMS no history can be obtained at this 

time.





A brief review of the patient's past medical records available in [facility] was 

performed





- Related Data


Allergies/Adverse Reactions: 


                                        





acetaminophen [From Vicodin] Allergy (Verified 05/08/18 11:39)


   


hydrocodone [From Vicodin] Allergy (Verified 05/08/18 11:39)


   











Past Medical History





- Social History


Smoking Status: Unknown if Ever Smoked


Family History: None, Reviewed & Not Pertinent


Patient has suicidal ideation: No


Patient has homicidal ideation: No





- Past Medical History


Cardiac Medical History: Reports: Hx Hypertension


Neurological Medical History: Reports: Hx Seizures


Renal/ Medical History: Denies: Hx Peritoneal Dialysis





- Immunizations


Immunizations up to date: No


Hx Diphtheria, Pertussis, Tetanus Vaccination: Yes





Review of Systems





- Review of Systems


-: Yes ROS unobtainable due to patient's medical condition - Sedated





Physical Exam





- Vital signs


Vitals: 


                                        











Temp Resp BP Pulse Ox


 


 98.5 F   20   174/95 H  97 


 


 02/16/19 06:58  02/16/19 06:58  02/16/19 06:58  02/16/19 06:58











Interpretation: Normal





- General


General appearance: Appears well, Alert





- HEENT


Head: Normocephalic, Atraumatic


Eyes: Normal


Conjunctiva: Normal


Cornea: Normal


Eyelashes: Normal


Pupils: PERRL


Ears: Normal


External canal: Normal


Tympanic membrane: Normal


Mouth/Lips: Other - Laceration of the tongue V-shaped on the left lateral border

approximately 2 cm


Pharynx: Normal


Neck: Normal





- Respiratory


Respiratory status: No respiratory distress


Chest status: Nontender


Breath sounds: Normal


Chest palpation: Normal





- Cardiovascular


Rhythm: Regular


Heart sounds: Normal auscultation


Murmur: No





- Abdominal


Inspection: Normal


Distension: No distension


Bowel sounds: Normal


Tenderness: Nontender


Organomegaly: No organomegaly





- Back


Back: Normal, Nontender





- Extremities


General upper extremity: Normal inspection, Nontender, Normal color, Normal ROM,

Normal temperature


General lower extremity: Normal inspection, Nontender, Normal color, Normal ROM,

Normal temperature, Normal weight bearing.  No: Nati's sign





- Neurological


Neuro grossly intact: Yes


Sylvester Coma Scale Eye Opening: To Voice


Sylvester Coma Scale Verbal: Confused


Sylvester Coma Scale Motor: Obeys Commands


Sylvester Coma Scale Total: 13


Speech: Normal


Motor strength normal: LUE, RUE, LLE, RLE


Sensory: Normal





- Psychological


Associated symptoms: Normal affect, Normal mood





- Skin


Skin Temperature: Warm


Skin Moisture: Dry


Skin Color: Normal





Course





- Re-evaluation


Re-evalutation: 





02/16/19 14:19


Observation of the patient after sedation wore off patient seen and relating 

around the room using a urinal however would lay back down and go back to sleep.

 I would attempt to reevaluate the patient patient would answer simple questions

however was very much uncooperative with examination.  Patient able to move all 

4 extremities ambulating with a steady gait.  After multiple attempts of 

examining the patient was able to find that he did have a tongue laceration.  

Explained to the patient that we will be able to repair this however he would 

like to stay awake and give consent.  Patient would not give verbal consent 

patient will continue to be asleep patient will take his head yes when I 

explained the procedure to him.  Patient remained silent while trying to obtain 

consent took this as patient not consenting to treatment to so his laceration of

his tongue.  Bleeding is otherwise well controlled.





Patient does have a low bicarb more likely due to seizure activity.  Patient had

a repeat showing increase in his bicarb.  Electrolytes otherwise are normal 

limits.  No other critical pathology patient was given Keppra here in ER.  

Josh was called patient was discharged into her custody.





- Vital Signs


Vital signs: 


                                        











Temp Pulse Resp BP Pulse Ox


 


 99.2 F      18   143/90 H  98 


 


 02/16/19 13:16     02/16/19 13:16  02/16/19 13:16  02/16/19 13:16














- Laboratory


Result Diagrams: 


                                 02/16/19 06:28





                                 02/16/19 10:07


Laboratory results interpreted by me: 


                                        











  02/16/19 02/16/19 02/16/19





  06:28 06:28 07:08


 


WBC  12.3 H  


 


Absolute Neutrophils  9.5 H  


 


Chloride   


 


Carbon Dioxide   10 L* 


 


Anion Gap   26 H 


 


Creatinine   1.26 H 


 


Glucose   


 


ALT   19 L 


 


Urine Blood    MODERATE H


 


Salicylates   < 1.0 L 


 


Acetaminophen   < 10 L 














  02/16/19





  10:07


 


WBC 


 


Absolute Neutrophils 


 


Chloride  110 H


 


Carbon Dioxide  20 L D


 


Anion Gap 


 


Creatinine 


 


Glucose  73 L


 


ALT 


 


Urine Blood 


 


Salicylates 


 


Acetaminophen 














Discharge





- Discharge


Clinical Impression: 


 Seizure, History of medication noncompliance





Tongue laceration


Qualifiers:


 Encounter type: initial encounter Qualified Code(s): S01.512A - Laceration 

without foreign body of oral cavity, initial encounter





Disposition: HOME, SELF-CARE


Instructions:  Febrile Seizure ([facility])


Additional Instructions: 


You were  seen today for seizure-like activity we did give you an IV dose of 

your Keppra.  Please continue to take your Keppra at home.





Please do not drink any alcohol as alcohol will increase your chances of having 

a seizure.





Your evaluation did reveal a tongue laceration.  I would recommend rinsing your 

mouth out after eating with water.





I offered to repair your tongue laceration today he did not give consent he may 

follow-up with your primary care physician for further evaluation of your tongue

laceration.  Your tongue laceration will heal on its own

## 2019-02-16 NOTE — EKG REPORT
SEVERITY:- ABNORMAL ECG -

SINUS RHYTHM

PROBABLE LEFT ATRIAL ABNORMALITY

LEFT VENTRICULAR HYPERTROPHY

:

Confirmed by: Nathaly Wood MD 16-Feb-2019 08:40:49

## 2019-03-21 NOTE — ER DOCUMENT REPORT
ED Seizure





- General


Chief Complaint: Probable Seizure


Stated Complaint: POSSIBLE SEIZURE


Time Seen by Provider: 03/21/19 13:04


Mode of Arrival: Medic


Information source: Emergency Med Personnel, [facility] Records


Notes: 





33-year-old male patient with seizure disorder brought to the emergency room by 

EMS.  By EMS history the patient has been on a 5-day alcohol binge.  He is known

to be frequently noncompliant.  He had Keppra levels done here on 1/25/2019 and 

the level is 15.8 and again on 2/16/2019 level 17.3.  Both of those are within 

the therapeutic range but at the lowest end of that range.  He comes here 

frequently for seizures after he had been drinking and probably not taking his 

medication.





- Related Data


Allergies/Adverse Reactions: 


                                        





acetaminophen [From Vicodin] Allergy (Verified 03/21/19 13:35)


   


hydrocodone [From Vicodin] Allergy (Verified 03/21/19 13:35)


   











Past Medical History





- General


Information source: Emergency Med Personnel, [facility] Records





- Social History


Smoking Status: Current Every Day Smoker


Cigarette use (# per day): Yes


Chew tobacco use (# tins/day): No


Smoking Education Provided: No


Frequency of alcohol use: Heavy


Drug Abuse: None


Occupation: Unemployed


Lives with: Spouse/Significant other


Family History: None, Reviewed & Not Pertinent


Patient has suicidal ideation: No


Patient has homicidal ideation: No





- Past Medical History


Cardiac Medical History: Reports: Hx Hypertension


Neurological Medical History: Reports: Hx Seizures


Past Surgical History: Reports: None





- Immunizations


Immunizations up to date: No


Hx Diphtheria, Pertussis, Tetanus Vaccination: Yes





Review of Systems





- Review of Systems


-: Yes ROS unobtainable due to patient's medical condition - Patient is 

extremely postictal at this time and not answering questions.





Physical Exam





- Vital signs


Vitals: 


                                        











BP Pulse Ox


 


 119/83   95 


 


 03/21/19 12:46  03/21/19 12:46











Interpretation: Normal





- General


General appearance: Other - Patient is trying to sleep, and appears to be 

somewhat postictal





- HEENT


Head: Normocephalic, Atraumatic


Eyes: Normal


Pupils: PERRL


Mouth/Lips: No: Laceration


Mucous membranes: Dry


Neck: Normal





- Respiratory


Respiratory status: No respiratory distress


Breath sounds: Normal





- Cardiovascular


Rhythm: Regular


Heart sounds: Normal auscultation


Murmur: No





- Abdominal


Inspection: Normal


Distension: No distension


Bowel sounds: Normal


Tenderness: Nontender





- Back


Back: Normal





- Extremities


General upper extremity: Normal inspection


General lower extremity: Normal inspection





- Neurological


Neuro grossly intact: Yes


Cognition: Other - Patient is postictal and not answering questions, however he 

was able to stick his tongue out when I asked him to do that.





- Psychological


Associated symptoms: Other - Patient is extremely postictal at this time and not

answering questions.





Course





- Re-evaluation


Re-evalutation: 





03/21/19 15:48


The patient has received 800 mg of Keppra IV, and 1 L of normal saline.  At this

time he is arousable.  He states that he has not missed any doses of his Keppra,

and he also states that he has plenty of medication at home.


Reviewing records shows that the last 2 times his Keppra levels were checked 

once in January 2019 once in February 2019, they are in the very low end of the 

therapeutic range.  If he is in fact taking his medication as he claims, then he

needs to be on a higher dose.  He will be advised to follow up with his primary 

care provider to discuss his Keppra dosing.











- Vital Signs


Vital signs: 


                                        











Temp Pulse Resp BP Pulse Ox


 


 98.4 F      18   118/78   96 


 


 03/21/19 12:47     03/21/19 15:01  03/21/19 15:01  03/21/19 15:01














- Laboratory


Result Diagrams: 


                                 03/21/19 12:43





                                 03/21/19 12:43


Laboratory results interpreted by me: 


                                        











  03/21/19 03/21/19





  12:43 12:43


 


Seg Neutrophils %  36.4 L 


 


Lymphocytes %  46.9 H 


 


Monocytes %  13.6 H 


 


Absolute Neutrophils  1.5 L 


 


Carbon Dioxide   21 L


 


ALT   14 L


 


Creatine Kinase   180 H














Discharge





- Discharge


Clinical Impression: 


 Seizures





Condition: Stable


Disposition: HOME, SELF-CARE


Additional Instructions: 


Seizure, Known Epileptic





     You have had a seizure.  Seizures may "break through" in an epileptic due 

to stress of infection or injury, a change in blood chemistry, or drug and 

alcohol use.  Another common cause is failure to take medication as prescribed.


     Your doctor has evaluated your situation for the likely cause of this 

seizure.  It is important that you follow his advice concerning any medication 

changes and follow-up care.  Further testing of anti-seizure medication levels 

in your blood may be necessary.


     If you have a 's license, it's important that you DO NOT DRIVE until 

given permission by your physician.  This seizure must be reported to the 

's license bureau.


     Call the doctor or return if seizures recur, or if new or unusual symptoms 

arise -- such as severe headache, confusion, excessive sleepiness, local 

weakness or numbness, neck stiffness, or fever.








****************************

****************************************************************





Be sure not to miss any doses of your seizure medication.


Stop drinking alcohol completely to reduce your frequency of seizures.


Follow-up with your primary care provider to discuss your Keppra dosing, since 

your levels are always on the low therapeutic side when they are checked.  You 

will have another level that was done today, available for review early next 

week.





RETURN TO THE EMERGENCY ROOM IF ANY NEW OR WORSENING SYMPTOMS.








Scribe Attestation: 





03/21/19 15:52


I personally performed the services described in the documentation, reviewed and

edited the documentation which was dictated to the scribe in my presence, and it

accurately records my words and actions.

## 2019-07-16 NOTE — ER DOCUMENT REPORT
ED General





- General


Chief Complaint: Probable Seizure


Stated Complaint: POSSIBLE SEIZURE


Time Seen by Provider: 07/16/19 15:00


Mode of Arrival: Medic


Information source: Relative, Emergency Med Personnel, [facility] Records


Cannot obtain history due to: Altered mental status


Notes: 





33-year-old male with a history of hypertension, epilepsy (known history of 

noncompliance with seizure medication and alcohol use) presents via EMS from 

home after a witnessed seizure by his wife.  EMS reported that the patient had 

no seizure-like activity during their interaction with him.  Nursing reports 

that just prior to my exam patient had a tonic-clonic seizure lasting 

approximately 1 minute.  She also reported that the patient did arrive alert and

awake and admitted to drinking "shots".  Unclear how much alcohol was consumed. 

Patient does smell of alcohol.  Has had prior similar visits.  Patient is 

unresponsive but breathing spontaneously.  Appears to be in a postictal state.


TRAVEL OUTSIDE OF THE [place] IN LAST 30 DAYS: No





- HPI


Onset: Just prior to arrival


Similar symptoms previously: Yes


Recently seen / treated by doctor: Yes





- Related Data


Allergies/Adverse Reactions: 


                                        





acetaminophen [From Vicodin] Allergy (Verified 03/21/19 13:35)


   


hydrocodone [From Vicodin] Allergy (Verified 03/21/19 13:35)


   











Past Medical History





- General


Information source: Relative, Emergency Med Personnel, [facility] Records


Cannot obtain history due to: Altered mental status





- Social History


Smoking Status: Unknown if Ever Smoked


Frequency of alcohol use: Heavy


Drug Abuse: None


Lives with: Spouse/Significant other


Family History: None, Reviewed & Not Pertinent





- Past Medical History


Cardiac Medical History: Reports: Hx Hypertension


Neurological Medical History: Reports: Hx Seizures


Renal/ Medical History: Denies: Hx Peritoneal Dialysis





- Immunizations


Immunizations up to date: No


Hx Diphtheria, Pertussis, Tetanus Vaccination: Yes





Review of Systems





- Review of Systems


-: Yes ROS unobtainable due to patient's medical condition





Physical Exam





- Vital signs


Vitals: 


                                        











Temp Pulse Resp BP Pulse Ox


 


 98.3 F   112 H  22 H  181/116 H  94 


 


 07/16/19 14:17  07/16/19 14:17  07/16/19 14:17  07/16/19 14:17  07/16/19 14:17














- Notes


Notes: 





PHYSICAL EXAMINATION:





GENERAL: Unresponsive, breathing spontaneously, on nonrebreather.





HEAD: Atraumatic, normocephalic.





EYES: Pupils equal round and reactive to light, sclera anicteric, conjunctiva 

are normal.  No nystagmus or abnormal eye movements.





ENT: Nares patent, oropharynx clear without exudates.  Moist mucous membranes.  

Small tongue laceration





NECK: Normal range of motion, supple without lymphadenopathy





LUNGS: Breath sounds clear to auscultation bilaterally and equal.  No wheezes 

rales or rhonchi.





HEART: Tachycardic, regular rhythm without murmurs





ABDOMEN: Soft, nontender, nondistended abdomen.  No guarding, no rebound.  No 

masses appreciated.





Musculoskeletal: No evidence of trauma


NEUROLOGICAL: GCS 9





PSYCH: Altered





SKIN: Small superficial abrasion on the right temporal scalp 3 cm.





Course





- Re-evaluation


Re-evalutation: 





Laboratory











  07/16/19 07/16/19 07/16/19





  15:14 15:22 15:22


 


WBC   16.9 H 


 


RBC   5.45 


 


Hgb   16.5 


 


Hct   52.0 H 


 


MCV   96 


 


MCH   30.4 


 


MCHC   31.8 L 


 


RDW   14.4 H 


 


Plt Count   314 


 


Seg Neutrophils %   72.8 


 


Lymphocytes %   20.0 


 


Monocytes %   6.1 


 


Eosinophils %   0.2 


 


Basophils %   0.9 


 


Absolute Neutrophils   12.3 H 


 


Absolute Lymphocytes   3.4 


 


Absolute Monocytes   1.0 


 


Absolute Eosinophils   0.0 


 


Absolute Basophils   0.1 


 


VBG pH   


 


VBG pCO2   


 


VBG HCO3   


 


VBG Base Excess   


 


Sodium    146.6 H


 


Potassium    4.5


 


Chloride    112 H


 


Carbon Dioxide    < 5 L*


 


Anion Gap    Not Reportable


 


BUN    7


 


Creatinine    1.45 H


 


Est GFR ( Amer)    > 60


 


Est GFR (Non-Af Amer)    56 L


 


Glucose    124 H


 


POC Glucose  112 H  


 


Calcium    9.8


 


Magnesium    3.1 H


 


Total Bilirubin    0.4


 


Direct Bilirubin    0.3


 


Neonat Total Bilirubin    Not Reportable


 


Neonat Direct Bilirubin    Not Reportable


 


Neonat Indirect Bili    Not Reportable


 


AST    45


 


ALT    9 L


 


Alkaline Phosphatase    151 H


 


Total Protein    10.2 H


 


Albumin    5.4 H


 


Urine Color   


 


Urine Appearance   


 


Urine pH   


 


Ur Specific Gravity   


 


Urine Protein   


 


Urine Glucose (UA)   


 


Urine Ketones   


 


Urine Blood   


 


Urine Nitrite   


 


Urine Bilirubin   


 


Urine Urobilinogen   


 


Ur Leukocyte Esterase   


 


Urine WBC (Auto)   


 


Urine RBC (Auto)   


 


Urine Bacteria (Auto)   


 


Squamous Epi Cells Auto   


 


Urine Mucus (Auto)   


 


Urine Ascorbic Acid   


 


Salicylates    < 1.0 L


 


Urine Opiates Screen   


 


Urine Methadone Screen   


 


Acetaminophen    < 10 L


 


Ur Barbiturates Screen   


 


Ur Phencyclidine Scrn   


 


Ur Amphetamines Screen   


 


U Benzodiazepines Scrn   


 


Urine Cocaine Screen   


 


U Marijuana (THC) Screen   


 


Serum Alcohol    < 10














  07/16/19 07/16/19 07/16/19





  16:53 16:53 18:05


 


WBC   


 


RBC   


 


Hgb   


 


Hct   


 


MCV   


 


MCH   


 


MCHC   


 


RDW   


 


Plt Count   


 


Seg Neutrophils %   


 


Lymphocytes %   


 


Monocytes %   


 


Eosinophils %   


 


Basophils %   


 


Absolute Neutrophils   


 


Absolute Lymphocytes   


 


Absolute Monocytes   


 


Absolute Eosinophils   


 


Absolute Basophils   


 


VBG pH    7.22 L


 


VBG pCO2    35.3


 


VBG HCO3    14.1 L


 


VBG Base Excess    -12.7


 


Sodium   


 


Potassium   


 


Chloride   


 


Carbon Dioxide   


 


Anion Gap   


 


BUN   


 


Creatinine   


 


Est GFR ( Amer)   


 


Est GFR (Non-Af Amer)   


 


Glucose   


 


POC Glucose   


 


Calcium   


 


Magnesium   


 


Total Bilirubin   


 


Direct Bilirubin   


 


Neonat Total Bilirubin   


 


Neonat Direct Bilirubin   


 


Neonat Indirect Bili   


 


AST   


 


ALT   


 


Alkaline Phosphatase   


 


Total Protein   


 


Albumin   


 


Urine Color  YELLOW  


 


Urine Appearance  CLOUDY  


 


Urine pH  5.0  


 


Ur Specific Gravity  1.016  


 


Urine Protein  100 H  


 


Urine Glucose (UA)  NEGATIVE  


 


Urine Ketones  TRACE H  


 


Urine Blood  MODERATE H  


 


Urine Nitrite  NEGATIVE  


 


Urine Bilirubin  NEGATIVE  


 


Urine Urobilinogen  NEGATIVE  


 


Ur Leukocyte Esterase  NEGATIVE  


 


Urine WBC (Auto)  2  


 


Urine RBC (Auto)  2  


 


Urine Bacteria (Auto)  TRACE  


 


Squamous Epi Cells Auto  1  


 


Urine Mucus (Auto)  RARE  


 


Urine Ascorbic Acid  NEGATIVE  


 


Salicylates   


 


Urine Opiates Screen   NEGATIVE 


 


Urine Methadone Screen   NEGATIVE 


 


Acetaminophen   


 


Ur Barbiturates Screen   NEGATIVE 


 


Ur Phencyclidine Scrn   NEGATIVE 


 


Ur Amphetamines Screen   NEGATIVE 


 


U Benzodiazepines Scrn   NEGATIVE 


 


Urine Cocaine Screen   NEGATIVE 


 


U Marijuana (THC) Screen   NEGATIVE 


 


Serum Alcohol   











                                        





Head CT  07/16/19 15:08


IMPRESSION:  No acute intracranial pathology.


EVIDENCE OF ACUTE STROKE: NO.


 











07/16/19 15:13


33-year-old male with known epilepsy and known noncompliance presents via EMS 

after witnessed seizure.  Has been drinking admittedly.  Was initially 

responsive upon arrival to the emergency department but then had a 1 minute 

tonic-clonic seizure that was reported to me by nursing.  Seizure stopped prior 

to administration of Ativan.  I did review the patient's medication records and 

the last time he filled his Keppra as far as I can see in our system was 

September 1, 2018.  Vitals reviewed upon arrival and patient is 95% on a 

nonrebreather, mildly tachycardic but afebrile.  There is a small superficial 

abrasion to the right temporal scalp.  Will obtain a CT.  Keppra administered as

well as IV fluids.


07/16/19 20:13


Nursing reported that the patient became agitated and administered Ativan.  

Patient is somnolent but arousable.  He is alert and oriented x2.  Blood 

pressure remains elevated.  Metoprolol will be administered.


07/16/19 21:56


Nursing did speak to the patient's wife who did witness the patient's seizure.  

She states that he has been out of his seizure medication for 2 days now.  He is

supposed to be on Keppra 500 mg twice daily.  Patient is also supposed to be on 

amlodipine which he states he needs a refill of.  Patient has been sleeping 

consistently for several hours and is now alert, awake and able to ambulate.  

Patient will be given a prescription for his Keppra and amlodipine.


07/17/19 10:52


Patient was evaluated and treated as appropriate for the patient's presenting 

symptoms and complaint, with consideration of any critical or life threatening 

conditions that may be associated with their obtained history and exam as noted 

above. All results were discussed with patient. Patient provided the opportunity

to ask questions, and express concerns. Patient was educated on treatments based

on their presumed diagnosis as noted above.  At this time we will discharge the 

patient with return precautions and follow-up recommendations.  Verbal discharge

instructions given a the bedside. Medication warnings reviewed. Patient is in 

agreement with this plan and has verbalized understanding of return precautions.







After careful consideration I feel that that patient can be safely discharged 

from the emergency department,  they were advised to followup with a primary 

care physician in 2-3 days. 








Dictation on this chart was performed using voice recognition software and may 

result in unintended grammatical, spelling, syntax or errors.

















- Vital Signs


Vital signs: 


                                        











Temp Pulse Resp BP Pulse Ox


 


 98.3 F   112 H  18   163/108 H  98 


 


 07/16/19 14:17  07/16/19 14:17  07/16/19 22:22  07/16/19 22:22  07/16/19 22:22














- Laboratory


Result Diagrams: 


                                 07/16/19 15:22





                                 07/16/19 20:19


Laboratory results interpreted by me: 


                                        











  07/16/19 07/16/19 07/16/19





  15:14 15:22 15:22


 


WBC   16.9 H 


 


Hct   52.0 H 


 


MCHC   31.8 L 


 


RDW   14.4 H 


 


Absolute Neutrophils   12.3 H 


 


VBG pH   


 


VBG HCO3   


 


Sodium    146.6 H


 


Chloride    112 H


 


Carbon Dioxide    < 5 L*


 


Creatinine    1.45 H


 


Est GFR (Non-Af Amer)    56 L


 


Glucose    124 H


 


POC Glucose  112 H  


 


Magnesium    3.1 H


 


ALT    9 L


 


Alkaline Phosphatase    151 H


 


Total Protein    10.2 H


 


Albumin    5.4 H


 


Urine Protein   


 


Urine Ketones   


 


Urine Blood   


 


Salicylates    < 1.0 L


 


Acetaminophen    < 10 L














  07/16/19 07/16/19 07/16/19





  16:53 18:05 20:19


 


WBC   


 


Hct   


 


MCHC   


 


RDW   


 


Absolute Neutrophils   


 


VBG pH   7.22 L 


 


VBG HCO3   14.1 L 


 


Sodium   


 


Chloride    115 H


 


Carbon Dioxide    16 L D


 


Creatinine    1.32 H


 


Est GFR (Non-Af Amer)   


 


Glucose    74 L


 


POC Glucose   


 


Magnesium   


 


ALT   


 


Alkaline Phosphatase   


 


Total Protein   


 


Albumin   


 


Urine Protein  100 H  


 


Urine Ketones  TRACE H  


 


Urine Blood  MODERATE H  


 


Salicylates   


 


Acetaminophen   














- Diagnostic Test


Radiology reviewed: Image reviewed, Reports reviewed





- EKG Interpretation by Me


EKG shows normal: Sinus rhythm


Rate: Normal


Rhythm: NSR


Voltage: Consistant with LVH


When compared to previous EKG there are: No significant change





Discharge





- Discharge


Clinical Impression: 


 Seizure, Noncompliance with medication regimen





Hypertension


Qualifiers:


 Hypertension type: unspecified Qualified Code(s): I10 - Essential (primary) 

hypertension





Condition: Good


Disposition: HOME, SELF-CARE


Instructions:  Seizure, Known Epileptic ([facility])


Additional Instructions: 


Regarding Blood Pressure:


 


Your blood pressure was noted to be greater than 120/80 at least once in the 

emergency room today.  


It is recommended that you follow-up with her primary care physician in the next

week for repeat blood pressure check.





The Centers for Medicare and Medicaid Services has specific recommendations 

regarding a person's blood pressure.  There are several lifestyle modifications 

that are recommended in order to help lower your blood pressure.


These include:


 


Quitting smoking if you smoke.


Reducing the amount of sodium in your diet.


Getting regular exercise


Limiting alcohol to no more than 2 drinks a day for men and one drink a day for 

women.


Eating a healthy diet, including more fruits and vegetables, low fat dairy 

products, less saturated and total fat.


Losing weight if you are overweight.


 


FOLLOW-UP:


  Call your doctor's office and let them know your blood pressure was elevated 

and you were advised to get your blood pressure checked in the above time-line. 

 If you are unable to get into your doctor's office in this time period, you can

follow-up with a new physician (I have left the numbers below for a few primary 

care doctors affiliated with this [facility]) or return to the ER.


 


PRIMARY CARE PHYSICIANS:


Dr. Daryl Mendieta 


[address], [place] [id number] [phone]


 


Dr Mata


Address: [address] [place] [address] [place] [address] 


[phone]


 


Dr Meyer


Address: [address] [place] [address] , [place] [address] 


[phone]


 





Prescriptions: 


RX: Amlodipine Besylate [Norvasc 10 mg Tablet] 10 mg PO DAILY #30 tablet


Levetiracetam [Keppra 500 mg Tablet] 500 mg PO Q12 #60 tablet


Forms:  Elevated Blood Pressure
n/a

## 2019-07-16 NOTE — RADIOLOGY REPORT (SQ)
EXAM DESCRIPTION:  CT HEAD WITHOUT



COMPLETED DATE/TIME:  7/16/2019 3:37 pm



REASON FOR STUDY:  abrasion right head seizure unwitnessed



COMPARISON:  2/16/2019



TECHNIQUE:  Axial images acquired through the brain without intravenous contrast.  Images reviewed wi
th bone, brain and subdural windows.  Additional sagittal and coronal reconstructions were generated.
 Images stored on PACS.

All CT scanners at this facility use dose modulation, iterative reconstruction, and/or weight based d
osing when appropriate to reduce radiation dose to as low as reasonably achievable (ALARA).

[facility]: Dose Right  [facility]: CareDose    [facility]: Dose Right    [facility]: Teradose 4D    [facility]: Smart [facility]



RADIATION DOSE:  CT Rad equipment meets quality standard of care and radiation dose reduction techniq
ues were employed. CTDIvol: 53.2 mGy. DLP: 1203 mGy-cm. mGy.



LIMITATIONS:  None.



FINDINGS:  VENTRICLES: Normal size and contour.

CEREBRUM: No masses.  No hemorrhage.  No midline shift.  No evidence for acute infarction. Normal gra
y/white matter differentiation. No areas of low density in the white matter.

CEREBELLUM: No masses.  No hemorrhage.  No alteration of density.  No evidence for acute infarction.

EXTRAAXIAL SPACES: No fluid collections.  No masses.

ORBITS AND GLOBE: No intra- or extraconal masses.  Normal contour of globe without masses.

CALVARIUM: No fracture.

PARANASAL SINUSES: No fluid or mucosal thickening.

SOFT TISSUES: No mass or hematoma.

OTHER: No other significant finding.



IMPRESSION:  No acute intracranial pathology.

EVIDENCE OF ACUTE STROKE: NO.



COMMENT:  Quality ID # [id number]: Final reports with documentation of one or more dose reduction techniques
 (e.g., Automated exposure control, adjustment of the mA and/or kV according to patient size, use of 
iterative reconstruction technique)



TECHNICAL DOCUMENTATION:  JOB ID:  [id number]

 2011 [employer]- All Rights Reserved



Reading location - IP/workstation name: [email or web address]

## 2019-07-17 NOTE — EKG REPORT
SEVERITY:- ABNORMAL ECG -

SINUS RHYTHM

CONSIDER LEFT VENTRICULAR HYPERTROPHY

NONSPECIFIC ST-T CHANGES  INFERIOR LEADS.

:

Confirmed by: Alvin Fountain MD 17-Jul-2019 07:52:37

## 2019-09-08 NOTE — EKG REPORT
SEVERITY:- ABNORMAL ECG -

SINUS TACHYCARDIA

PROBABLE LEFT ATRIAL ABNORMALITY

LEFT VENTRICULAR HYPERTROPHY

:

Confirmed by: Bobby Zuleta 08-Sep-2019 18:55:40

## 2019-09-08 NOTE — PDOC H&P
History of Present Illness


Admission Date/PCP: 


  09/08/19 13:09





  


Patient admitted today for seizure disorder and alcohol abuse


History of Present Illness: 


TIBURICO LOGAN is a 33 year old male who is brought to the emergency room 

because of seizure disorder.  Patient has a known history of seizures as well as

alcohol abuse.  Patient is said in the past he only takes his Keppra "when I 

drink".  Reportedly had 3 seizures last night.  Patient originally did not have 

a fever when he presented to the emergency room but after several hours his 

temperature is gone up to 102.





Patient is now obtunded either from being postictal or from Ativan to control 

his agitation.  The chart patient is also supposed to be taking metoprolol as 

well as Norvasc.  Patient is unable to give any history due to altered mental 

status and no family members has not








Past Medical History


Medical History: Other - Unable to get medical history except from the chart


Cardiac Medical History: Reports: Hypertension


Neurological Medical History: Reports: Seizures


Psychiatric Medical History: Reports: Alcohol Dependency





Social History


Smoking Status: Unknown if Ever Smoked





- Advance Directive


Resuscitation Status: Full Code





Family History


Family History: None, Reviewed & Not Pertinent


Parental Family History Reviewed: No


Children Family History Reviewed: No


Sibling(s) Family History Reviewed.: No





Medication/Allergy


Home Medications: 








No Home Medications 1 12/31/11 


Phenytoin Sodium Extended [Dilantin 100 Mg Capsule.Er] 200 mg PO BID #60 capsule

11/15/12 


Phenytoin Sodium Extended [Dilantin] 300 mg PO DAILY #90 capsule 06/28/13 


Ibuprofen 600 mg PO QID PRN #30 tablet 12/24/13 


Naproxen [Naprosyn 375 Mg Tablet] 375 mg PO BID #20 tablet 01/06/18 


Tramadol HCl [Ultram 50 mg Tablet] 50 mg PO Q6HP PRN #12 tablet 05/08/18 


Phenytoin Sodium Extended [Dilantin 100 mg Capsule.er] 100 mg PO Q8 #90 capsule 

05/19/18 


Levetiracetam [Keppra 500 mg Tablet] 500 mg PO Q12 #60 tablet 08/25/18 


Amox Tr/Potassium Clavulanate [Augmentin 875-125 Tablet] 1 tab PO BID 10 Days 

#14 tablet 08/30/18 


Levetiracetam [Keppra 500 mg Tablet] 1,000 mg PO Q12 #120 tablet 12/22/18 


Amlodipine Besylate [Norvasc 10 mg Tablet] 10 mg PO DAILY #30 tablet 07/16/19 


Levetiracetam [Keppra 500 mg Tablet] 500 mg PO Q12 #60 tablet 07/16/19 


Levetiracetam [Keppra 500 mg Tablet] 500 mg PO Q12 #60 tablet 09/08/19 








Allergies/Adverse Reactions: 


                                        





acetaminophen [From Vicodin] Allergy (Verified 03/21/19 13:35)


   


hydrocodone [From Vicodin] Allergy (Verified 03/21/19 13:35)


   











Review of Systems


ROS unobtainable: Due to mental status





Physical Exam


Vital Signs: 


                                        











Temp Pulse Resp BP Pulse Ox


 


 101.5 F H  112 H  34 H  145/92 H  98 


 


 09/08/19 11:57  09/08/19 03:32  09/08/19 12:16  09/08/19 12:16  09/08/19 12:16








                                 Intake & Output











 09/07/19 09/08/19 09/09/19





 06:59 06:59 06:59


 


Intake Total  2006 1151


 


Balance  2006 1151


 


Weight  81 kg 











General appearance: PRESENT: severe distress


Head exam: PRESENT: atraumatic, normocephalic


Cardiovascular exam: PRESENT: RRR.  ABSENT: diastolic murmur, rubs, systolic 

murmur


Neurological exam: PRESENT: altered, other - Post ictal or obtunded secondary to

benzodiazepines





Results


Laboratory Results: 


                                        





                                 09/08/19 06:19 





                                        











  09/08/19 09/08/19 09/08/19





  03:52 03:55 06:19


 


Sodium  148.9 H   142.5


 


Potassium  4.4   4.5


 


Chloride  109 H   111 H


 


Carbon Dioxide  < 5 L*   19 L D


 


Anion Gap  Not Reportable   13


 


BUN  15   16


 


Creatinine  1.90 H   1.47 H


 


Est GFR ( Amer)  49 L   > 60


 


Glucose  222 H   110


 


Calcium  10.9 H   9.2


 


Total Bilirubin  0.4  


 


AST  39  


 


Alkaline Phosphatase  143 H  


 


Total Protein  10.1 H  


 


Albumin  5.7 H  


 


Urine Color   STRAW 


 


Urine Appearance   CLEAR 


 


Urine pH   5.0 


 


Ur Specific Gravity   1.010 


 


Urine Protein   100 H 


 


Urine Glucose (UA)   NEGATIVE 


 


Urine Ketones   NEGATIVE 


 


Urine Blood   MODERATE H 


 


Urine Nitrite   NEGATIVE 


 


Ur Leukocyte Esterase   NEGATIVE 


 


Urine RBC (Auto)   0 


 


Fluid Tube Number   


 


CSF Volume   


 


CSF Appearance   


 


CSF Color   


 


CSF WBC   


 


CSF RBC   


 


CSF Glucose   


 


CSF Total Protein   














  09/08/19 09/08/19 09/08/19





  11:48 11:48 11:48


 


Sodium   


 


Potassium   


 


Chloride   


 


Carbon Dioxide   


 


Anion Gap   


 


BUN   


 


Creatinine   


 


Est GFR (African Amer)   


 


Glucose   


 


Calcium   


 


Total Bilirubin   


 


AST   


 


Alkaline Phosphatase   


 


Total Protein   


 


Albumin   


 


Urine Color   


 


Urine Appearance   


 


Urine pH   


 


Ur Specific Gravity   


 


Urine Protein   


 


Urine Glucose (UA)   


 


Urine Ketones   


 


Urine Blood   


 


Urine Nitrite   


 


Ur Leukocyte Esterase   


 


Urine RBC (Auto)   


 


Fluid Tube Number  1   4


 


CSF Volume  4.0   4.0


 


CSF Appearance  CLEAR   CLEAR


 


CSF Color  COLORLESS   COLORLESS


 


CSF WBC  2   3


 


CSF RBC  7   5


 


CSF Glucose   82 H 


 


CSF Total Protein   83 H 











Impressions: 


                                        





Head CT  09/08/19 00:00


IMPRESSION: 


 


No acute intracranial findings.


 








Chest X-Ray  09/08/19 08:56


IMPRESSION:  NO ACUTE RADIOGRAPHIC FINDING IN THE CHEST.


 














Assessment and Plan





- Diagnosis


(1) Alcohol abuse


Is this a current diagnosis for this admission?: Yes   


Plan: 


She has a known past medical history of alcohol abuse although his alcohol level

on this admission less than 10 urine drug screen is negative








(2) Altered mental status


Is this a current diagnosis for this admission?: Yes   


Plan: 


Patient has an oral airway in place.  Patient has had 3 separate seizures that 

we know of a be postictal.  He is also been given a total of 8 mg of Ativan IV 

over the course of his ER visit.





Patient is obtunded at this time unable to give any history








(3) Fever


Is this a current diagnosis for this admission?: Yes   


Plan: 


Patient came in his temperature was 98 6 then it went up to 100.3 and then 101.5

and now 100.2, over a course of 10 hours.





Patient has had 2 chest x-rays in the ER both are negative acute disease.  CT 

head scan is negative








(4) Seizure


Is this a current diagnosis for this admission?: Yes   


Plan: 


She has a known seizure history and was supposed to be taking Keppra patient had

2 previous levels one in January of this year that was 15.8 and another one 

February of this year 17.3.  Both of these are therapeutic but on the low side.








- Time


Time Spent with patient: 35 or more minutes

## 2019-09-08 NOTE — ER DOCUMENT REPORT
ED General





- General


Chief Complaint: Seizure


Stated Complaint: SEIZURE


Time Seen by Provider: 09/08/19 03:46


Mode of Arrival: Medic


TRAVEL OUTSIDE OF THE [place] IN LAST 30 DAYS: No





- HPI


Notes: 





Patient presents to the emergency department for evaluation.  Initially this 

patient was a sign out from the prior ED physician.  I was notified that the 

patient had a new finding of fever.  I did go in and reevaluate the patient.  

The patient himself is not able to give me any sort of history, history entirely

obtained from the patient's wife, present at bedside.  Patient's wife states 

that he has a history of seizure disorder.  He is supposed to be taking Keppra, 

but was told that he should not mix it with alcohol.  Therefore, since he is 

alcohol dependent and drinks daily, he does not take any Keppra.  He had 3 

seizures last night according to doctor's notes.  There is some confusion, as 

they had been told that the patient normally takes several days to return to 

baseline.  The patient's wife at bedside tells me that normally within an hour 

he is back to baseline.  I asked her if she knew anything about a fever.  She 

states that her grandchildren live with them, they have been having fevers with 

vomiting and diarrhea.  She notes that she saw him directly drinking alcohol 

last evening.





- Related Data


Allergies/Adverse Reactions: 


                                        





acetaminophen [From Vicodin] Allergy (Verified 03/21/19 13:35)


   


hydrocodone [From Vicodin] Allergy (Verified 03/21/19 13:35)


   








Home Medications: Keppra and an antihypertensive.  The patient is noncompliant 

with both.





Past Medical History





- General


Information source: Patient, Relative, Emergency Med Personnel, [facility]





- Social History


Smoking Status: Unknown if Ever Smoked


Family History: None, Reviewed & Not Pertinent


Patient has suicidal ideation: No


Patient has homicidal ideation: No





- Past Medical History


Cardiac Medical History: Reports: Hx Hypertension


Neurological Medical History: Reports: Hx Seizures


Renal/ Medical History: Denies: Hx Peritoneal Dialysis





- Immunizations


Immunizations up to date: No


Hx Diphtheria, Pertussis, Tetanus Vaccination: Yes





Review of Systems





- Review of Systems


-: Yes ROS unobtainable due to patient's medical condition





Physical Exam





- Vital signs


Vitals: 


                                        











Temp Pulse Resp BP


 


 98.6 F   112 H  32 H  227/135 H


 


 09/08/19 03:32  09/08/19 03:32  09/08/19 03:32  09/08/19 03:32














- Notes


Notes: 





This is a 33-year-old male who appears his stated age, no acute distress.  He is

somnolent but rises to painful stimuli, eventually verbal stimuli.  He has an 

oral airway in place.  Pupils are equal and round, reactive to light.  Nares are

patent.  Oral mucosa is moist.  Neck is supple, I do not appreciate any 

meningismus, negative Kernig's emergency signs.  Heart is regular rate and 

rhythm, lungs are clear station bilaterally.  Abdomen soft, nontender, 

normoactive bowel sounds.  No gross facial asymmetry.  Patient moves all 4 

extremity spontaneously.





Course





- Re-evaluation


Re-evalutation: 





09/08/19 12:00


Patient presents to the emergency department for evaluation.  He was initially 

worked up by my colleague.  At that time he was found to have a significant 

lactic acidosis and resultant low bicarb.  He was hydrated with significant 

improvement.  During the course of his stay, however, the patient developed a 

fever.  According to his wife he did not return to baseline.  Given his 

seizures, lack of return to baseline, as well as fever, I was inclined to 

perform lumbar puncture.  Consent was obtained from his wife.  Procedure went 

without difficulty.  Patient was treated with IV Rocephin.  Because I am getting

conflicting reports, about the fact that he was drinking but had a negative 

alcohol level, repeat blood alcohol level was ordered.  We will continue to 

monitor.





- Vital Signs


Vital signs: 


                                        











Temp Pulse Resp BP Pulse Ox


 


 101.5 F H  112 H  34 H  145/92 H  98 


 


 09/08/19 11:57  09/08/19 03:32  09/08/19 12:16  09/08/19 12:16  09/08/19 12:16














- Laboratory


Result Diagrams: 


                                 09/08/19 06:19


Laboratory results interpreted by me: 


                                        











  09/08/19 09/08/19 09/08/19





  03:52 03:55 06:19


 


Sodium  148.9 H  


 


Chloride  109 H   111 H


 


Carbon Dioxide  < 5 L*   19 L D


 


Creatinine  1.90 H   1.47 H


 


Est GFR ( Amer)  49 L  


 


Est GFR (MDRD) Non-Af  41 L   55 L


 


Glucose  222 H  


 


Calcium  10.9 H  


 


Alkaline Phosphatase  143 H  


 


Total Protein  10.1 H  


 


Albumin  5.7 H  


 


Urine Protein   100 H 


 


Urine Blood   MODERATE H 


 


CSF Glucose   


 


CSF Total Protein   














  09/08/19





  11:48


 


Sodium 


 


Chloride 


 


Carbon Dioxide 


 


Creatinine 


 


Est GFR ( Amer) 


 


Est GFR (MDRD) Non-Af 


 


Glucose 


 


Calcium 


 


Alkaline Phosphatase 


 


Total Protein 


 


Albumin 


 


Urine Protein 


 


Urine Blood 


 


CSF Glucose  82 H


 


CSF Total Protein  83 H














- Diagnostic Test


Radiology reviewed: Reports reviewed


Radiology results interpreted by me: 





09/08/19 12:42





                                        





Chest X-Ray  09/08/19 00:00


IMPRESSION: 


 


Clear lungs.


 








Head CT  09/08/19 00:00


IMPRESSION: 


 


No acute intracranial findings.


 








Chest X-Ray  09/08/19 08:56


IMPRESSION:  NO ACUTE RADIOGRAPHIC FINDING IN THE CHEST.


 














- EKG Interpretation by Me


Additional EKG results interpreted by me: 





09/08/19 12:42


Sinus mechanism with a rate of 100 bpm.  LVH.  Nonspecific ST changes, but no 

acute changes concerning for ischemia or infarction.





Procedures





- Lumbar Puncture


  ** Lumbar puncture


Time completed: 11:50


Consent obtained: Yes


Lumbar puncture pre-procedure: Sterile PPE donned, Betadine prep applied, 

Sterile drapes applied


Patient position: Lying - Left lateral recumbent


Needle size: 22


Lumbar puncture location: L3-4


Anesthetic type: 1% Lidocaine


mL's of anesthetic: 3


Amount/type of drainage: 4-1/2 cc of clear CSF


Number of attempts: 1


Complications: No





Discharge





- Discharge


Clinical Impression: 


 Seizure, Hyperglycemia, Dehydration, Fever, Altered mental status





Hypertension


Qualifiers:


 Hypertension type: unspecified Qualified Code(s): I10 - Essential (primary) 

hypertension





Kidney injury


Qualifiers:


 Encounter type: initial encounter Laterality: unspecified laterality Qualified 

Code(s): S37.009A - Unspecified injury of unspecified kidney, initial encounter





Condition: Stable


Disposition: ADMITTED AS INPATIENT


Admitting Provider: ASYA Martinez


Unit Admitted: ICU


Instructions:  Dehydration ([facility]), Kidney Injury ([facility]), Seizure, Known Epileptic 

([facility])


Additional Instructions: 


You need to followup urgently with your primary care doctor as your blood sugars

were  high today.  You did not have any evidence of a dangerous condition 

associated with these blood sugars at this time. However, it is very important 

that you get your blood sugars under control.  Please take all of your 

medications exactly as directed.  You should avoid foods that are high in 

carbohydrates and sugary foods.  Losing weight will also help to better control 

your blood sugars.  Please return to emergency department immediately if you 

develop weakness, persistent vomiting, confusion, or any other symptoms that are

concerning to you.





Today you had a seizure.  It is imperative that you take your seizure medication

as prescribed.  It is very important that you do not engage in any activities 

that could result in severe injury should you have a seizure.  Specifically, do 

not drive a vehicle, go into a body of water, take a bath, climb ladders, or 

operate any heavy machinery until you have been cleared by your neurologist. 

Please return to the ED immediately if you have multiple seizures close 

together, develop a severe headache, weakness, numbness, difficulty speaking, 

have a seizure in which you do not return to normal within 1 hour of the 

seizure, or have any other symptoms that are concerning to you.


Prescriptions: 


Levetiracetam [Keppra 500 mg Tablet] 500 mg PO Q12 #60 tablet


Forms:  Elevated Blood Pressure

## 2019-09-08 NOTE — EKG REPORT
SEVERITY:- ABNORMAL ECG -

SINUS TACHYCARDIA

PROBABLE LEFT VENTRICULAR HYPERTROPHY

TALL T, CONSIDER METABOLIC/ISCHEMIC ABNRM

PROLONGED QT INTERVAL

:

Confirmed by: Bobby Zuleta 08-Sep-2019 18:55:56

## 2019-09-08 NOTE — RADIOLOGY REPORT (SQ)
CLINICAL HISTORY:  r/o stroke 



COMPARISON: None.



TECHNIQUE: CT HEAD WITHOUT IV CONTRAST on 9/8/2019 12:00 AM CDT



This exam was performed according to our departmental

dose-optimization program, which includes automated exposure

control, adjustment of the mA and/or kV according to patient size

and/or use of iterative reconstruction technique.



FINDINGS: 



There is no acute hemorrhage, mass effect or midline shift.

Gray-white differentiation is preserved. There is no

hydrocephalus. There is no significant volume loss for age.



The calvarium is intact. Orbits and globes are unremarkable. The

paranasal sinuses are clear. Mastoid air cells are clear.



IMPRESSION: 



No acute intracranial findings.

## 2019-09-08 NOTE — RADIOLOGY REPORT (SQ)
EXAM DESCRIPTION:  CHEST SINGLE VIEW



COMPLETED DATE/TIME:  9/8/2019 9:12 am



REASON FOR STUDY:  fever



COMPARISON:  Earlier same day.



EXAM PARAMETERS:  NUMBER OF VIEWS: One view.

TECHNIQUE: Single frontal radiographic view of the chest acquired.

RADIATION DOSE: NA

LIMITATIONS: None.



FINDINGS:  LUNGS AND PLEURA: No opacities, masses or pneumothorax. No pleural effusion.

MEDIASTINUM AND HILAR STRUCTURES: No masses.  Contour normal.

HEART AND VASCULAR STRUCTURES: Heart normal in size.  Normal vasculature.

BONES: No acute findings.

HARDWARE: None in the chest.

OTHER: No other significant finding.



IMPRESSION:  NO ACUTE RADIOGRAPHIC FINDING IN THE CHEST.



TECHNICAL DOCUMENTATION:  JOB ID:  [id number]

 2011 [employer]- All Rights Reserved



Reading location - IP/workstation name: [email or web address]

## 2019-09-08 NOTE — ER DOCUMENT REPORT
ED General





- General


Chief Complaint: Seizure


Stated Complaint: SEIZURE


Time Seen by Provider: 09/08/19 03:46


Mode of Arrival: Medic


Information source: Relative, Friend, Emergency Med Personnel, [facility]


Cannot obtain history due to: Altered mental status


Notes: 





33-year-old male with known seizure disorder, known noncompliance with his 

seizure medication and presents via EMS from home after seizures witnessed by 

his wife.  Wife states that the patient only takes his Keppra when he wants to 

drink alcohol.  She did state that they did heavy drinking tonight.   No 

reported recent illness from EMS.


TRAVEL OUTSIDE OF THE [place] IN LAST 30 DAYS: No





- HPI


Onset: Just prior to arrival


Onset/Duration: Sudden





- Related Data


Allergies/Adverse Reactions: 


                                        





acetaminophen [From Vicodin] Allergy (Verified 03/21/19 13:35)


   


hydrocodone [From Vicodin] Allergy (Verified 03/21/19 13:35)


   











Past Medical History





- General


Information source: Relative, Emergency Med Personnel, [facility]


Cannot obtain history due to: Altered mental status





- Social History


Smoking Status: Unknown if Ever Smoked


Family History: None, Reviewed & Not Pertinent


Patient has suicidal ideation: No


Patient has homicidal ideation: No





- Past Medical History


Cardiac Medical History: Reports: Hx Hypertension


Neurological Medical History: Reports: Hx Seizures


Renal/ Medical History: Denies: Hx Peritoneal Dialysis





- Immunizations


Immunizations up to date: No


Hx Diphtheria, Pertussis, Tetanus Vaccination: Yes





Review of Systems





- Review of Systems


-: Yes ROS unobtainable due to patient's medical condition





Physical Exam





- Vital signs


Vitals: 


                                        











Temp Pulse Resp BP


 


 98.6 F   112 H  32 H  227/135 H


 


 09/08/19 03:32  09/08/19 03:32  09/08/19 03:32  09/08/19 03:32














- Notes


Notes: 





PHYSICAL EXAMINATION:





GENERAL: Somnolent, spontaneous respirations, vomit on clothing





HEAD: Atraumatic, normocephalic.





EYES: Pupils equal round and reactive to light, extraocular movements intact, 

sclera anicteric, conjunctiva are normal.





ENT: Nares patent, oropharynx clear without exudates.  Moist mucous membranes.  

Laceration of the tongue on the left side.





NECK: Normal range of motion, supple without lymphadenopathy





LUNGS: Breath sounds clear to auscultation bilaterally and equal.  No wheezes 

rales or rhonchi.





HEART: Regular rate and rhythm without murmurs





ABDOMEN: Soft, nontender, nondistended abdomen.  No guarding, no rebound.  No 

masses appreciated.  Incontinent of urine





Musculoskeletal: Normal range of motion, no pitting or edema.  No cyanosis.





NEUROLOGICAL: Postictal





PSYCH: Cooperative





SKIN: Warm, Dry, normal turgor, no rashes or lesions noted.





Course





- Re-evaluation


Re-evalutation: 





09/08/19 05:32





Laboratory











  09/08/19 09/08/19 09/08/19





  03:52 03:55 03:55


 


Sodium  148.9 H  


 


Potassium  4.4  


 


Chloride  109 H  


 


Carbon Dioxide  < 5 L*  


 


Anion Gap  Not Reportable  


 


BUN  15  


 


Creatinine  1.90 H  


 


Est GFR ( Amer)  49 L  


 


Est GFR (MDRD) Non-Af  41 L  


 


Glucose  222 H  


 


Calcium  10.9 H  


 


Total Bilirubin  0.4  


 


Direct Bilirubin  0.4  


 


Neonat Total Bilirubin  Not Reportable  


 


Neonat Direct Bilirubin  Not Reportable  


 


Neonat Indirect Bili  Not Reportable  


 


AST  39  


 


ALT  22  


 


Alkaline Phosphatase  143 H  


 


Total Protein  10.1 H  


 


Albumin  5.7 H  


 


Urine Color   STRAW 


 


Urine Appearance   CLEAR 


 


Urine pH   5.0 


 


Ur Specific Gravity   1.010 


 


Urine Protein   100 H 


 


Urine Glucose (UA)   NEGATIVE 


 


Urine Ketones   NEGATIVE 


 


Urine Blood   MODERATE H 


 


Urine Nitrite   NEGATIVE 


 


Urine Bilirubin   NEGATIVE 


 


Urine Urobilinogen   NEGATIVE 


 


Ur Leukocyte Esterase   NEGATIVE 


 


Urine RBC (Auto)   0 


 


U Hyaline Cast (Auto)   3 


 


Urine Bacteria (Auto)   1+ 


 


Squamous Epi Cells Auto   1 


 


Urine Mucus (Auto)   RARE 


 


Urine Ascorbic Acid   NEGATIVE 


 


Urine Opiates Screen    NEGATIVE


 


Urine Methadone Screen    NEGATIVE


 


Ur Barbiturates Screen    NEGATIVE


 


Ur Phencyclidine Scrn    NEGATIVE


 


Ur Amphetamines Screen    NEGATIVE


 


U Benzodiazepines Scrn    NEGATIVE


 


Urine Cocaine Screen    NEGATIVE


 


U Marijuana (THC) Screen    NEGATIVE


 


Serum Alcohol  < 10  











                                        











Temp Pulse Resp BP Pulse Ox


 


 98.6 F   112 H  23 H  194/112 H  100 


 


 09/08/19 03:32  09/08/19 03:32  09/08/19 04:51  09/08/19 04:51  09/08/19 04:51











Laboratory











  09/08/19 09/08/19 09/08/19





  03:52 03:55 03:55


 


Sodium  148.9 H  


 


Potassium  4.4  


 


Chloride  109 H  


 


Carbon Dioxide  < 5 L*  


 


Anion Gap  Not Reportable  


 


BUN  15  


 


Creatinine  1.90 H  


 


Est GFR ( Amer)  49 L  


 


Est GFR (MDRD) Non-Af  41 L  


 


Glucose  222 H  


 


Calcium  10.9 H  


 


Total Bilirubin  0.4  


 


Direct Bilirubin  0.4  


 


Neonat Total Bilirubin  Not Reportable  


 


Neonat Direct Bilirubin  Not Reportable  


 


Neonat Indirect Bili  Not Reportable  


 


AST  39  


 


ALT  22  


 


Alkaline Phosphatase  143 H  


 


Total Protein  10.1 H  


 


Albumin  5.7 H  


 


Urine Color   STRAW 


 


Urine Appearance   CLEAR 


 


Urine pH   5.0 


 


Ur Specific Gravity   1.010 


 


Urine Protein   100 H 


 


Urine Glucose (UA)   NEGATIVE 


 


Urine Ketones   NEGATIVE 


 


Urine Blood   MODERATE H 


 


Urine Nitrite   NEGATIVE 


 


Urine Bilirubin   NEGATIVE 


 


Urine Urobilinogen   NEGATIVE 


 


Ur Leukocyte Esterase   NEGATIVE 


 


Urine RBC (Auto)   0 


 


U Hyaline Cast (Auto)   3 


 


Urine Bacteria (Auto)   1+ 


 


Squamous Epi Cells Auto   1 


 


Urine Mucus (Auto)   RARE 


 


Urine Ascorbic Acid   NEGATIVE 


 


Urine Opiates Screen    NEGATIVE


 


Urine Methadone Screen    NEGATIVE


 


Ur Barbiturates Screen    NEGATIVE


 


Ur Phencyclidine Scrn    NEGATIVE


 


Ur Amphetamines Screen    NEGATIVE


 


U Benzodiazepines Scrn    NEGATIVE


 


Urine Cocaine Screen    NEGATIVE


 


U Marijuana (THC) Screen    NEGATIVE


 


Serum Alcohol  < 10  











                                        





Head CT  09/08/19 00:00


IMPRESSION: 


 


No acute intracranial findings.


 








33-year-old male with known seizure history presents after a witnessed seizure 

at home.  Per EMS the wife did report that the patient had been drinking and francois

s only take his Keppra when he wants to drink.  Upon arrival patient was alert, 

awake and in asking for urinal.  Patient did have a witnessed tonic-clonic 

seizure which lasted approximately 30 seconds and self resolved.  Patient had a 

second seizure and 2 mg of IM Ativan was administered as well as 1500 mg of 

Keppra.  Again this seizure lasted less than 1 minute.  Patient had an 

additional seizure and did receive 2 mg of IV Ativan.  He has had spontaneous 

respirations, no hypoxia.  Patient was placed on a nonrebreather temporarily and

an oral airway was placed.  Despite the report of heavy drinking patient's 

alcohol is less than 10.  Urine drug screen is negative.  CT of the head was 

obtained and showed no acute abnormalities.  Patient has been evaluated multiple

times and is still unresponsive but breathing spontaneously with stable vital 

signs.  EMS also reported that the wife stated that patient often has a long 

postictal periods.  At this time patient will be evaluated for airway c

ompromise, recurrent seizures.


09/08/19 05:49





09/08/19 05:54


Patient was reevaluated and is now having spontaneous movement.  He is 

responsive to painful stimuli.  Repeat BMP pending.  I did attempt to contact 

the wife that phone #[phone] without success.  Patient will be signed out 

to Dr. Cagle with repeat BMP pending and observation for return to baseline 

mental status.


09/08/19 06:18








- Vital Signs


Vital signs: 


                                        











Temp Pulse Resp BP Pulse Ox


 


 100.2 F   112 H  34 H  145/92 H  98 


 


 09/08/19 13:47  09/08/19 03:32  09/08/19 12:16  09/08/19 12:16  09/08/19 12:16














- Laboratory


Result Diagrams: 


                                 09/08/19 06:19


Laboratory results interpreted by me: 


                                        











  09/08/19 09/08/19 09/08/19





  03:52 03:55 06:19


 


Sodium  148.9 H  


 


Chloride  109 H   111 H


 


Carbon Dioxide  < 5 L*   19 L D


 


Creatinine  1.90 H   1.47 H


 


Est GFR ( Amer)  49 L  


 


Est GFR (MDRD) Non-Af  41 L   55 L


 


Glucose  222 H  


 


Calcium  10.9 H  


 


Alkaline Phosphatase  143 H  


 


Total Protein  10.1 H  


 


Albumin  5.7 H  


 


Urine Protein   100 H 


 


Urine Blood   MODERATE H 


 


CSF Glucose   


 


CSF Total Protein   














  09/08/19





  11:48


 


Sodium 


 


Chloride 


 


Carbon Dioxide 


 


Creatinine 


 


Est GFR ( Amer) 


 


Est GFR (MDRD) Non-Af 


 


Glucose 


 


Calcium 


 


Alkaline Phosphatase 


 


Total Protein 


 


Albumin 


 


Urine Protein 


 


Urine Blood 


 


CSF Glucose  82 H


 


CSF Total Protein  83 H














- Diagnostic Test


Radiology reviewed: Image reviewed, Reports reviewed





Discharge





- Discharge


Clinical Impression: 


 Seizure, Hyperglycemia, Dehydration, Fever, Altered mental status





Hypertension


Qualifiers:


 Hypertension type: unspecified Qualified Code(s): I10 - Essential (primary) 

hypertension





Kidney injury


Qualifiers:


 Encounter type: initial encounter Laterality: unspecified laterality Qualified 

Code(s): S37.009A - Unspecified injury of unspecified kidney, initial encounter





Condition: Stable


Disposition: ADMITTED AS INPATIENT

## 2019-09-08 NOTE — RADIOLOGY REPORT (SQ)
CLINICAL HISTORY:  possible aspiration 



COMPARISON: None.



TECHNIQUE: XR CHEST 1 VIEW 9/8/2019 12:00 AM CDT



FINDINGS: 



Cardiac silhouette is normal in size. Lungs are clear without

consolidation, atelectasis, mass or edema. There is no pleural

effusion. There is no pneumothorax. There are no acute osseous

findings.



IMPRESSION: 



Clear lungs.

## 2019-09-09 NOTE — PDOC PROGRESS REPORT
Subjective


Progress Note for:: 09/09/19


Subjective:: 


Mr. Jha is resting in bed.  He does not appear to be uncomfortable.  He 

appears to have minimal interest in verbal interaction during this encounter.  

He is very slow to answer questions.  He is not maintain or establish eye 

contact.  His answers are somewhat mumbled and in a low voice.


Reason For Visit: 


SEIZURE DISORDER,PATIENT NONCOMPLIANCE,HYPERTENSIO








Physical Exam


Vital Signs: 


                                        











Temp Pulse Resp BP Pulse Ox


 


 98.8 F   93   11 L  145/100 H  99 


 


 09/09/19 06:00  09/09/19 05:03  09/09/19 06:08  09/09/19 06:08  09/09/19 06:08








                                 Intake & Output











 09/08/19 09/09/19 09/10/19





 06:59 06:59 06:59


 


Intake Total 2006 1641 


 


Output Total  925 


 


Balance 2006 716 


 


Weight 81 kg 81.7 kg 











General appearance: PRESENT: no acute distress, well-developed.  ABSENT: 

cooperative - Sullen and quiet


Eye exam: PRESENT: conjunctiva pink.  ABSENT: scleral icterus


Ear exam: PRESENT: normal external ear exam.  ABSENT: bleeding, drainage


Mouth exam: PRESENT: moist, tongue midline


Respiratory exam: PRESENT: clear to auscultation dana, symmetrical, unlabored.  

ABSENT: rales, rhonchi, tachypnea, wheezes


Cardiovascular exam: PRESENT: RRR, +S1, +S2.  ABSENT: diastolic murmur, systolic

murmur


Pulses: PRESENT: normal radial pulses, +2 pedal pulses bilateral


GI/Abdominal exam: PRESENT: normal bowel sounds, soft.  ABSENT: distended, 

tenderness


Rectal exam: PRESENT: deferred


Gentrourinary exam: ABSENT: indwelling catheter


Extremities exam: ABSENT: pedal edema


Musculoskeletal exam: PRESENT: ambulatory, normal inspection.  ABSENT: deformity


Neurological exam: PRESENT: awake, oriented to person, oriented to place, 

oriented to situation, CN II-XII grossly intact.  ABSENT: alert - seems somewhat

somnolent


Psychiatric exam: PRESENT: flat affect, other - Very limited interaction.  

Patient provided very limited answers to questions.  He did not keep his eyes 

open during the encounter.  He did not attempt to maintain eye contact..  

ABSENT: agitated, anxious


Focused psych exam: ABSENT: delusional, restlessness





Results


Laboratory Results: 


                                        





                                 09/09/19 04:45 





                                 09/09/19 04:45 





                                        











  09/08/19 09/08/19 09/08/19





  03:52 11:48 11:48


 


WBC  19.6 H  


 


RBC  5.33  


 


Hgb  16.2  


 


Hct  53.1 H  


 


MCV  100 H  


 


MCH  30.4  


 


MCHC  30.5 L  


 


RDW  15.1 H  


 


Plt Count  239  


 


Seg Neutrophils %   


 


Sodium   


 


Potassium   


 


Chloride   


 


Carbon Dioxide   


 


Anion Gap   


 


BUN   


 


Creatinine   


 


Est GFR (African Amer)   


 


Glucose   


 


Calcium   


 


Magnesium   


 


Ammonia   


 


Fluid Tube Number   1 


 


CSF Volume   4.0 


 


CSF Appearance   CLEAR 


 


CSF Color   COLORLESS 


 


CSF WBC   2 


 


CSF RBC   7 


 


CSF Glucose    82 H


 


CSF Total Protein    83 H














  09/08/19 09/09/19 09/09/19





  11:48 04:45 04:45


 


WBC   8.2 


 


RBC   5.04 


 


Hgb   15.4 


 


Hct   44.8 


 


MCV   89  D 


 


MCH   30.5 


 


MCHC   34.3 


 


RDW   14.0 


 


Plt Count   192 


 


Seg Neutrophils %   71.5 


 


Sodium    138.9


 


Potassium    3.7


 


Chloride    106


 


Carbon Dioxide    22


 


Anion Gap    11


 


BUN    11


 


Creatinine    0.97


 


Est GFR ( Amer)    > 60


 


Glucose    93


 


Calcium    9.4


 


Magnesium    2.6 H


 


Ammonia   


 


Fluid Tube Number  4  


 


CSF Volume  4.0  


 


CSF Appearance  CLEAR  


 


CSF Color  COLORLESS  


 


CSF WBC  3  


 


CSF RBC  5  


 


CSF Glucose   


 


CSF Total Protein   














  09/09/19





  04:45


 


WBC 


 


RBC 


 


Hgb 


 


Hct 


 


MCV 


 


MCH 


 


MCHC 


 


RDW 


 


Plt Count 


 


Seg Neutrophils % 


 


Sodium 


 


Potassium 


 


Chloride 


 


Carbon Dioxide 


 


Anion Gap 


 


BUN 


 


Creatinine 


 


Est GFR (African Amer) 


 


Glucose 


 


Calcium 


 


Magnesium 


 


Ammonia  19.5


 


Fluid Tube Number 


 


CSF Volume 


 


CSF Appearance 


 


CSF Color 


 


CSF WBC 


 


CSF RBC 


 


CSF Glucose 


 


CSF Total Protein 








                                        











  09/08/19





  06:19


 


Creatine Kinase  356 H











Impressions: 


                                        





Head CT  09/08/19 00:00


IMPRESSION: 


 


No acute intracranial findings.


 








Chest X-Ray  09/08/19 08:56


IMPRESSION:  NO ACUTE RADIOGRAPHIC FINDING IN THE CHEST.


 














Assessment and Plan





- Diagnosis


(1) Seizure


Is this a current diagnosis for this admission?: Yes   


Plan: 


She has a known seizure history and was supposed to be taking Keppra patient had

2 previous levels one in January of this year that was 15.8 and another one 

February of this year 17.3.  Both of these are therapeutic but on the low side.





9/9/2019-the patient presented to the emergency department after having multiple

seizures.  Since initiation of Keppra he is seizure-free.  He should be past the

postictal state.  He has been seizure-free while in the hospital.  He is 

downgraded to medical floor status.








(2) Hypertension


Qualifiers: 


   Hypertension type: essential hypertension   Qualified Code(s): I10 - Essenti

al (primary) hypertension   


Is this a current diagnosis for this admission?: Yes   


Plan: 


9/9/2019-the patient was on antihypertensive medications prior to this 

admission.  There is a question of compliance.  His blood pressures are still 

elevated.  I have ordered 5 mg of enalapril by intravenous and we will add oral 

ace inhibitor therapy.  He does have hydralazine ordered as needed and is on 

metoprolol 200 mg daily.








(3) Altered mental status


Is this a current diagnosis for this admission?: Yes   


Plan: 


Patient has an oral airway in place.  Patient has had 3 separate seizures that 

we know of a be postictal.  He is also been given a total of 8 mg of Ativan IV 

over the course of his ER visit.





Patient is obtunded at this time unable to give any history





9/9/2019-the altered mental status was a combination of a post ictal state and 

benzodiazepine therapy for his seizure.  He is no longer on benzodiazepines.  He

is communicative although in a limited fashion.  The medications may still be 

wearing off.  The postictal altered mental status has resolved.








(4) Fever


Is this a current diagnosis for this admission?: Yes   


Plan: 


Patient came in his temperature was 98 6 then it went up to 100.3 and then 101.5

and now 100.2, over a course of 10 hours.





Patient has had 2 chest x-rays in the ER both are negative acute disease.  CT 

head scan is negative





9/9/2019-the patient was febrile upon arrival in the emergency department.  He 

appeared to be dehydrated in the increased muscle activity from several seizures

could certainly have elevated his temperature.  Having experienced multiple 

seizures he is at risk for aspiration pneumonia.  He is afebrile now.  His chest

x-ray has been negative but he is also on antibiotic therapy.  He did receive IV

fluids.  If there is no evidence of infection I would consider discontinuing his

antibiotics.








(5) Leukocytosis


Qualifiers: 


   Leukocytosis type: unspecified   Qualified Code(s): D72.829 - Elevated white 

blood cell count, unspecified   


Is this a current diagnosis for this admission?: Yes   


Plan: 


9/9/2019-the patient's elevated white count is likely a combination of his 

seizure activity and dehydration with possible underlying infection.  His white 

blood cell count has normalized.  He is on antibiotics for possible aspiration 

pneumonia.  Leukocytosis has resolved.  If still suspicious for infection 

despite 2- chest x-rays consider continuing antibiotics.








(6) Kidney injury


Qualifiers: 


   Encounter type: initial encounter   Laterality: unspecified laterality   

Qualified Code(s): S37.009A - Unspecified injury of unspecified kidney, initial 

encounter   


Is this a current diagnosis for this admission?: Yes   


Plan: 


9/9/2019-the patient's serum creatinine was elevated on admission.  He did have 

a slightly elevated creatinine kinase level that is secondary to his seizure 

activity.  His BUN was normal and but his MCV, calcium, total protein, sodium, 

chloride and albumin were elevated.  Dehydration might be a consideration and an

etiology of his elevated creatinine.  At this point his serum creatinine has 

normalized.  His BUN is remained normal.  Kidney injury is resolved.








(7) Dehydration


Is this a current diagnosis for this admission?: Yes   


Plan: 


9/9/2019-please see above








(8) Rhabdomyolysis


Qualifiers: 


   Rhabdomyolysis type: non-traumatic   Qualified Code(s): M62.82 - 

Rhabdomyolysis   


Is this a current diagnosis for this admission?: Yes   


Plan: 


9/9/2019-the mildly elevated creatinine kinase level is likely due to increased 

muscular activity from seizure.  This should normalize with the aggressive IV 

hydration.








(9) Alcohol abuse


Is this a current diagnosis for this admission?: Yes   


Plan: 


9/9/2019-the patient has a history of alcohol abuse.  His serum alcohol level 

was negative on admission.  Seizures may have been induced by withdrawal however

he does have a positive seizure history.  Continue to monitor for withdrawal 

symptoms.








- Time


Time Spent with patient: 15-24 minutes


Medications reviewed and adjusted accordingly: Yes


Anticipated discharge: Home

## 2019-09-12 NOTE — LEFT AGAINST MEDICAL ADVICE
Against Medical Advice


Admission Date/Time: 09/08/19 13:09





 Primary Care Provider: 








Date of Patient Emigration: 09/10/19





- Diagnosis:


(1) Altered mental status


Is this a current diagnosis for this admission?: Yes   





(2) Dehydration


Is this a current diagnosis for this admission?: Yes   





(3) Fever


Is this a current diagnosis for this admission?: Yes   





(4) Kidney injury


Is this a current diagnosis for this admission?: Yes   





(5) Leukocytosis


Is this a current diagnosis for this admission?: Yes   





(6) Rhabdomyolysis


Is this a current diagnosis for this admission?: Yes   





(7) Seizure


Is this a current diagnosis for this admission?: Yes   





- Summary:


Summary: 


Please see Admission and Progress Notes as well.





TIBURCIO LOGAN is a 33 M, who LEFT AGAINST MEDICAL ADVICE.





The Patient was admitted on 09/08/19 13:09.





Saw patient in the morning who was awake and enjoying her breakfast, alert and 

oriented, very slow to response, not sure if this was his baseline, after 

talking to his wife in the afternoon she stated that 3 years ago patient had a 

TBI and since then level of communication has decreased, patient is disabled due

to TBI however making his own medical decisions.  Patient is still having 

intermittent fever however leukocytosis improved, has not had any seizure.  

Patient was asked to stay in the hospital until his fever has solved as it may 

cause more seizures.  Patient still wanted to leave the hospital AMA, as per my 

conversation with his wife he still has supplies of her Keppra.  Wife were 

strongly encouraged to continue his Keppra and if he develops another seizure to

come back to ED right away.  Patient and wife voiced understanding.

## 2019-10-18 NOTE — ER DOCUMENT REPORT
ED General





- General


Chief Complaint: Seizure


Stated Complaint: POSS SEIZURE


Time Seen by Provider: 10/18/19 05:35


Notes: 





Patient is a 33-year-old male with a known history of seizures who presents to 

the emergency department post ictal after his second seizure of the night.  No 

family is at bedside at this time.  According to EMS, the patient had been given

thousand milligrams of Keppra by his wife shortly before having his second 

seizure.  No reports of vomiting.  According to the patient's medical record, he

is noncompliant with his medications from his previous visit when he was here in

ICU.


TRAVEL OUTSIDE OF THE [place] IN LAST 30 DAYS: No





- Related Data


Allergies/Adverse Reactions: 


                                        





acetaminophen [From Vicodin] Allergy (Verified 03/21/19 13:35)


   


hydrocodone [From Vicodin] Allergy (Verified 03/21/19 13:35)


   











Past Medical History





- General


Information source: [facility] Records





- Social History


Smoking Status: Unknown if Ever Smoked


Family History: None, Reviewed & Not Pertinent





- Past Medical History


Cardiac Medical History: Reports: Hx Hypertension


Neurological Medical History: Reports: Hx Seizures


Renal/ Medical History: Denies: Hx Peritoneal Dialysis





- Immunizations


Immunizations up to date: No


Hx Diphtheria, Pertussis, Tetanus Vaccination: Yes





Review of Systems





- Review of Systems


-: Yes ROS unobtainable due to patient's medical condition





Physical Exam





- Vital signs


Vitals: 


                                        











Pulse Ox


 


 100 


 


 10/18/19 05:12














- Notes


Notes: 





PHYSICAL EXAMINATION:





GENERAL: Appears well, healthy, well-nourished, no acute distress. 





HEAD:  Normocephalic, atraumatic.





EYES:  PERRL, conjunctiva normal, all extraocular movements intact, sclera 

nonicteric





ENT: Dry mucous membranes. 





NECK: Supple, no noticeable swelling, redness, rash.  Normal range of motion.





LUNGS: Equal breath sounds bilaterally and clear to auscultation.  No wheezes 

rales or rhonchi.





CARDIOVASCULAR: S1-S2, regular rate, regular rhythm.  Radial pulses 2+, normal.





ABDOMEN: Normoactive bowel sounds.  Soft, nontender,  no guarding, no rebound 

tenderness, and no masses palpated.





EXTREMITIES: Normal strength and range of motion, no pitting or edema.  No 

cyanosis. 





NEUROLOGICAL: Unresponsive, postictal.





PSYCH: Normal mood, normal affect.





SKIN: Warm, dry.  No rash, lesions, ulcerations noted.  Normal skin turgor.





Course





- Re-evaluation


Re-evalutation: 


10/18/19 06:40


Patient CO2 is 9 and he has a creatinine of 1.13.  I suspect this is due to 

dehydration.  His blood pressure is elevated.  I will give him hydralazine and a

fluid bolus.  Chest x-ray is negative at this time.  CT of the head is negative 

for any intracranial bleed.


10/18/19 07:00


I reevaluated the patient and he is able to tell me where he is, his name, and 

date of birth.  I asked the patient if he takes his Keppra every day and he 

stated "yes."  I told him that he had a seizure today.  I asked him if he is 

really compliant with his medication and he stated, "I ran out."  I then asked 

him if he ran out, how did his wife give him a dose of Keppra when he had his 

first seizure this morning.  He did not have anything to say and he ended up 

pulling the covers over his head.  I asked him if he drank alcohol tonight and 

he did not respond.








10/18/19 07:58


I spoke with Dr. Hdz.  The patient will be admitted to [facility].





- Vital Signs


Vital signs: 


                                        











Temp Pulse Resp BP Pulse Ox


 


 97.4 F      18   170/113 H  100 


 


 10/18/19 08:01     10/18/19 08:01  10/18/19 08:01  10/18/19 08:01














- Laboratory


Result Diagrams: 


                                 10/18/19 05:40





                                 10/18/19 05:40


Laboratory results interpreted by me: 


                                        











  10/18/19 10/18/19 10/18/19





  05:40 05:40 05:40


 


Hct  51.9 H  


 


RDW  15.4 H  


 


VBG pH   


 


Chloride   108 H 


 


Carbon Dioxide   9 L* 


 


Anion Gap   26 H 


 


Creatinine   1.31 H 


 


Glucose   116 H 


 


Creatine Kinase   248 H 


 


Total Protein   9.5 H 


 


Albumin   5.4 H 


 


Urine Protein    30 H


 


Urine Blood    MODERATE H


 


Salicylates   


 


Acetaminophen   














  10/18/19 10/18/19





  05:40 06:58


 


Hct  


 


RDW  


 


VBG pH   7.27 L


 


Chloride  


 


Carbon Dioxide  


 


Anion Gap  


 


Creatinine  


 


Glucose  


 


Creatine Kinase  


 


Total Protein  


 


Albumin  


 


Urine Protein  


 


Urine Blood  


 


Salicylates  < 1.0 L 


 


Acetaminophen  < 10 L 














- EKG Interpretation by Me


Additional EKG results interpreted by me: 





10/18/19 07:59


Sinus tachycardia.  Rate 109.  ; QRS 98; ; QTc 480.  No ST 

elevations or depressions noted.





Discharge





- Discharge


Clinical Impression: 


 Seizure, Acute kidney injury





Condition: Fair


Disposition: ADMITTED AS INPATIENT


Admitting Provider: Wilder (Hospitalist)


Unit Admitted: [facility]

## 2019-10-18 NOTE — RADIOLOGY REPORT (SQ)
Chest single view on  10/18/2019 



CLINICAL INDICATION: Unresponsive



COMPARISON: 9/8/2019



FINDINGS: There is mild elevation of the right hemidiaphragm.

There is minimal basilar atelectasis or scarring. Lungs are

otherwise clear. Cardiac, hilar and mediastinal contours are

within normal limits. Pulmonary vascularity is within normal

limits.



IMPRESSION: No acute disease.

## 2019-10-18 NOTE — RADIOLOGY REPORT (SQ)
EXAM:



CT head without IV contrast



CLINICAL DATA:



seizure; unresponsive; hypertensive



TECHNICAL DATA: 



Multiple axial CT images of the brain were performed followed by

sagittal and coronal reconstructed images. The CT study is

performed according to ALARA (as low as reasonably achievable) or

ALARA/IMAGE GENTLY, with automatic adjustment of mA and/or kV

according to patient size.



Performed on: 10/18/2019 at 6:13 AM



Comparisons:  9/8/2019.



FINDINGS: 



There is no evidence of mass, acute mass effect or midline shift.

There are no acute extra-axial fluid collections.  There is no

evidence of acute intracranial hemorrhage.



The cerebral sulci and ventricles are normal in size and

configuration.



There are no focal abnormal areas of increased or decreased

attenuation.



There is no significant mucosal thickening of the paranasal

sinuses.



The mastoid air cells are clear.



The orbital contents are grossly unremarkable. There is an old

medial wall blowout fracture of the left orbit with herniation of

fat through the orbital wall defect.



No acute osseous abnormalities are identified.



There is minimal left frontal parietal scalp soft tissue

swelling.



IMPRESSION:



1. There is no evidence of acute intracranial pathology.

2. Minimal left frontal parietal scalp soft tissue swelling.

## 2019-10-18 NOTE — PDOC H&P
History of Present Illness


Admission Date/PCP: 


  10/18/19 08:12





  





History of Present Illness: 


TIBURCIO LOGAN is a 33 year old male with a known history of seizures 

noncompliant, TBI presented brought into ED by EMS after having a seizure at 

home.  On my first encounter patient is very lethargic and postictal, arousable 

but does not provide any information, later that day when he was more alert and 

not lethargic anymore he stated that he missed 3 of his Depakote doses but did 

not provide any clear reasons. Patient denies any recent trauma, fever, chills, 

nausea, vomiting, diarrhea, constipation or any urinary symptoms. 





Past Medical History


Cardiac Medical History: Reports: Hypertension


Neurological Medical History: Reports: Seizures


Psychiatric Medical History: 


   Denies: Depression





Social History


Smoking Status: Current Every Day Smoker


Cigarettes Packs Per Day: 1


Electronic Cigarette use?: No


Number of Years Smoking: 10


Frequency of Alcohol Use: Occasional


Hx Recreational Drug Use: No


Drugs: None


Hx Prescription Drug Abuse: No





Family History


Family History: None, Reviewed & Not Pertinent


Parental Family History Reviewed: Yes


Children Family History Reviewed: Yes


Sibling(s) Family History Reviewed.: Yes





Medication/Allergy


Home Medications: 








Levetiracetam [Keppra] 1,000 mg PO Q12 90 Days #180 tab 10/19/19 


Lisinopril 20 mg PO DAILY 30 Days #30 tablet 10/19/19 








Allergies/Adverse Reactions: 


                                        





acetaminophen [From Vicodin] Allergy (Verified 03/21/19 13:35)


   


hydrocodone [From Vicodin] Allergy (Verified 03/21/19 13:35)


   











Review of Systems


Review of Systems: 


as per hpi





Physical Exam


Vital Signs: 


                                        











Temp Pulse Resp BP Pulse Ox


 


 99.6 F   79   15   159/88 H  100 


 


 10/18/19 17:34  10/18/19 17:34  10/18/19 17:34  10/18/19 17:34  10/18/19 17:34








                                 Intake & Output











 10/17/19 10/18/19 10/19/19





 06:59 06:59 06:59


 


Intake Total   1450


 


Output Total   1578


 


Balance   -128


 


Weight  87 kg 87 kg











General appearance: PRESENT: no acute distress, well-developed, well-nourished


Respiratory exam: PRESENT: clear to auscultation dana.  ABSENT: rales, rhonchi, 

wheezes


Cardiovascular exam: PRESENT: RRR.  ABSENT: diastolic murmur, rubs, systolic 

murmur


GI/Abdominal exam: PRESENT: normal bowel sounds, soft.  ABSENT: distended, 

guarding, mass, organolmegaly, rebound, tenderness


Extremities exam: PRESENT: full ROM.  ABSENT: calf tenderness, clubbing, pedal 

edema


Neurological exam: PRESENT: alert, altered, awake, oriented to person, oriented 

to place, oriented to time, CN II-XII grossly intact, motor sensory deficit





Results


Laboratory Results: 


                                        





                                 10/18/19 05:40 





                                 10/18/19 08:57 





                                        











  10/18/19 10/18/19 10/18/19





  05:40 05:40 05:40


 


WBC  9.9  


 


RBC  5.45  


 


Hgb  16.9  


 


Hct  51.9 H  


 


MCV  95  


 


MCH  31.0  


 


MCHC  32.6  


 


RDW  15.4 H  


 


Plt Count  234  


 


Seg Neutrophils %  60.8  


 


VBG pH   


 


VBG pCO2   


 


VBG HCO3   


 


VBG Base Excess   


 


Sodium   142.9 


 


Potassium   4.5 


 


Chloride   108 H 


 


Carbon Dioxide   9 L* 


 


Anion Gap   26 H 


 


BUN   12 


 


Creatinine   1.31 H 


 


Est GFR ( Amer)   > 60 


 


Glucose   116 H 


 


Calcium   9.8 


 


Total Bilirubin   0.3 


 


AST   32 


 


Alkaline Phosphatase   110 


 


Total Protein   9.5 H 


 


Albumin   5.4 H 


 


Urine Color    STRAW


 


Urine Appearance    CLEAR


 


Urine pH    5.0


 


Ur Specific Gravity    1.012


 


Urine Protein    30 H


 


Urine Glucose (UA)    NEGATIVE


 


Urine Ketones    NEGATIVE


 


Urine Blood    MODERATE H


 


Urine Nitrite    NEGATIVE


 


Ur Leukocyte Esterase    NEGATIVE


 


Urine WBC (Auto)    0


 


Urine RBC (Auto)    0














  10/18/19 10/18/19





  06:58 08:57


 


WBC  


 


RBC  


 


Hgb  


 


Hct  


 


MCV  


 


MCH  


 


MCHC  


 


RDW  


 


Plt Count  


 


Seg Neutrophils %  


 


VBG pH  7.27 L 


 


VBG pCO2  47.1 


 


VBG HCO3  21.1 


 


VBG Base Excess  -6.1 


 


Sodium   140.7


 


Potassium   4.0


 


Chloride   110 H


 


Carbon Dioxide   19 L D


 


Anion Gap   12


 


BUN   12


 


Creatinine   1.02


 


Est GFR (African Amer)   > 60


 


Glucose   100


 


Calcium   9.2


 


Total Bilirubin  


 


AST  


 


Alkaline Phosphatase  


 


Total Protein  


 


Albumin  


 


Urine Color  


 


Urine Appearance  


 


Urine pH  


 


Ur Specific Gravity  


 


Urine Protein  


 


Urine Glucose (UA)  


 


Urine Ketones  


 


Urine Blood  


 


Urine Nitrite  


 


Ur Leukocyte Esterase  


 


Urine WBC (Auto)  


 


Urine RBC (Auto)  








                                        











  10/18/19 10/18/19





  05:40 05:40


 


Creatine Kinase  248 H 


 


CK-MB (CK-2)   2.33


 


Troponin I   < 0.012











Impressions: 


                                        





Head CT  10/18/19 05:38


IMPRESSION:


 


1. There is no evidence of acute intracranial pathology.


2. Minimal left frontal parietal scalp soft tissue swelling.


 


 








Chest X-Ray  10/18/19 05:41


IMPRESSION: No acute disease. 


 














Assessment and Plan





- Diagnosis


(1) Seizure


Is this a current diagnosis for this admission?: Yes   


Plan: 


No history of seizure disorder.  History of noncompliance.


Patient is stating that he missed 3 doses, does not provide any clear reason.


Will admit to ICU, with seizure fall precautions, restart Keppra and benzos as 

needed.








(2) Acute kidney injury


Is this a current diagnosis for this admission?: Yes   


Plan: 


Prerenal.


Cautious volume resuscitation, monitor volume status and electrolytes.








(3) Hypertension


Qualifiers: 


 


Is this a current diagnosis for this admission?: Yes   


Plan: 


Noncompliant.


We will start on lisinopril.


Adjust dose as needed.  


Outpatient PCP follow-up.

## 2019-10-20 NOTE — PDOC H&P
History of Present Illness


Admission Date/PCP: 


  10/18/19 08:12





  





History of Present Illness: 


TIBURCIO LOGAN is a 33 year old male with a known history of seizures 

noncompliant, TBI presented brought into ED by EMS after having a seizure at 

home.  On my first encounter patient is very lethargic and postictal, arousable 

but does not provide any information, later that day when he was more alert and 

not lethargic anymore he stated that he missed 3 of his Depakote doses but did 

not provide any clear reasons. Patient denies any recent trauma, fever, chills, 

nausea, vomiting, diarrhea, constipation or any urinary symptoms. 





Past Medical History


Cardiac Medical History: Reports: Hypertension


Neurological Medical History: Reports: Seizures


Psychiatric Medical History: 


   Denies: Depression





Social History


Smoking Status: Current Every Day Smoker


Cigarettes Packs Per Day: 1


Electronic Cigarette use?: No


Number of Years Smoking: 10


Frequency of Alcohol Use: Occasional


Hx Recreational Drug Use: No


Drugs: None


Hx Prescription Drug Abuse: No





Family History


Family History: None, Reviewed & Not Pertinent


Parental Family History Reviewed: Yes


Children Family History Reviewed: Yes


Sibling(s) Family History Reviewed.: Yes





Medication/Allergy


Home Medications: 








Levetiracetam [Keppra] 1,000 mg PO Q12 90 Days #180 tab 10/19/19 


Lisinopril 20 mg PO DAILY 30 Days #30 tablet 10/19/19 








Allergies/Adverse Reactions: 


                                        





acetaminophen [From Vicodin] Allergy (Verified 03/21/19 13:35)


   


hydrocodone [From Vicodin] Allergy (Verified 03/21/19 13:35)


   











Physical Exam


Vital Signs: 


                                        











Temp Pulse Resp BP Pulse Ox


 


 98.8 F   67   16   159/88 H  95 


 


 10/19/19 12:41  10/19/19 12:41  10/19/19 12:41  10/19/19 12:41  10/19/19 12:41








                                 Intake & Output











 10/19/19 10/20/19 10/21/19





 06:59 06:59 06:59


 


Intake Total 2564 1354 


 


Output Total 1578  


 


Balance 986 1354 


 


Weight 84.1 kg  











General appearance: PRESENT: no acute distress, well-developed, well-nourished


Head exam: PRESENT: atraumatic, normocephalic


Eye exam: PRESENT: conjunctiva pink, EOMI, PERRLA.  ABSENT: scleral icterus


Ear exam: PRESENT: normal external ear exam


Mouth exam: PRESENT: moist, tongue midline


Neck exam: ABSENT: carotid bruit, JVD, lymphadenopathy, thyromegaly


Respiratory exam: PRESENT: clear to auscultation dana.  ABSENT: rales, rhonchi, 

wheezes


Cardiovascular exam: PRESENT: RRR.  ABSENT: diastolic murmur, rubs, systolic 

murmur


Pulses: PRESENT: normal dorsalis pedis pul


Vascular exam: PRESENT: normal capillary refill


GI/Abdominal exam: PRESENT: normal bowel sounds, soft.  ABSENT: distended, 

guarding, mass, organolmegaly, rebound, tenderness


Rectal exam: PRESENT: deferred


Extremities exam: PRESENT: full ROM.  ABSENT: calf tenderness, clubbing, pedal 

edema


Neurological exam: PRESENT: alert, awake, oriented to person, oriented to place,

oriented to time, CN II-XII grossly intact, other - Alert oriented x3 however 

there is slow when he is communicates..  ABSENT: motor sensory deficit


Psychiatric exam: PRESENT: appropriate affect.  ABSENT: homicidal ideation, 

suicidal ideation


Skin exam: PRESENT: dry, intact, warm.  ABSENT: cyanosis, rash





Results


Laboratory Results: 


                                        





                                 10/19/19 05:24 





                                 10/19/19 09:56 





                                        











  10/18/19 10/18/19





  05:40 05:40


 


Creatine Kinase  248 H 


 


CK-MB (CK-2)   2.33


 


Troponin I   < 0.012











Impressions: 


                                        





Head CT  10/18/19 05:38


IMPRESSION:


 


1. There is no evidence of acute intracranial pathology.


2. Minimal left frontal parietal scalp soft tissue swelling.


 


 








Chest X-Ray  10/18/19 05:41


IMPRESSION: No acute disease. 


 














Assessment and Plan





- Diagnosis


(1) Seizure


Is this a current diagnosis for this admission?: Yes   


Plan: 


No history of seizure disorder.  History of noncompliance.


Patient is stating that he missed 3 doses, does not provide any clear reason.


Was admitted to [facility], with seizure fall precautions, restart Keppra and benzos 

as needed.


Did not have any seizure episode while inpatient.


Patient was extensively counseled on the need for medication adherence.


Patient was discharged on Keppra 1000 mg p.o. twice daily and a prescription was

provided for him for the next 3 months just in case he does not follow-up with 

PCP. 








(2) Acute kidney injury


Is this a current diagnosis for this admission?: Yes   


Plan: 


Prerenal.


Resolved.


Started on cautious volume resuscitation, monitor volume status and 

electrolytes.








(3) Hypertension


Qualifiers: 


 


Is this a current diagnosis for this admission?: Yes   


Plan: 


Noncompliant.


Start on lisinopril.  


Normotensive and euvolemic at the time of discharge.  Was started on normal we 

will start on lisinopril.


Outpatient PCP follow-up.








(4) Hypokalemia


Is this a current diagnosis for this admission?: Yes   


Plan: 


Likely due to low p.o. intake.


Was started on supplemental p.o. potassium.


Potassium three-point.  Day of discharge, patient was given 40 mEq of p.o. 

potassium before discharge.


Encouraged to follow-up with PCP for reevaluation of his potassium level.

## 2019-11-12 NOTE — PDOC H&P
History of Present Illness


History of Present Illness: 


TIBURCIO LOGAN is a 33 year old male past medical history of TBI, seizure 

disorder, EtOH abuse, hypertension known to be medically noncompliant recently 

discharged after sustaining a seizure brought in by ED after having a seizure at

home, and for seizure in route to the hospital.  In ED he was noted to be 

postictal, started on Keppra and hospitalist admitted for admission.





On my encounter patient is heavily sedated arousable to voice unfortunately no 

family available at the bedside.





CT head negative for any acute abnormalities, CBC positive for leukocytosis and 

CMP positive for MARKIE. 





Past Medical History


Cardiac Medical History: Reports: Hypertension


Neurological Medical History: Reports: Seizures


Psychiatric Medical History: 


   Denies: Depression





Social History


Smoking Status: Unknown if Ever Smoked


Frequency of Alcohol Use: Occasional


Hx Recreational Drug Use: No


Drugs: None


Hx Prescription Drug Abuse: No





Family History


Family History: None, Reviewed & Not Pertinent


Parental Family History Reviewed: Yes


Children Family History Reviewed: Yes


Sibling(s) Family History Reviewed.: Yes





Medication/Allergy


Home Medications: 








Levetiracetam [Keppra] 1,000 mg PO Q12 90 Days #180 tab 10/19/19 


Lisinopril 20 mg PO DAILY 30 Days #30 tablet 10/19/19 








Allergies/Adverse Reactions: 


                                        





acetaminophen [From Vicodin] Allergy (Verified 03/21/19 13:35)


   


hydrocodone [From Vicodin] Allergy (Verified 03/21/19 13:35)


   











Review of Systems


ROS unobtainable: Due to mental status





Physical Exam


Vital Signs: 


                                        











Temp Pulse Resp BP Pulse Ox


 


 98.2 F      15   188/127 H  98 


 


 11/12/19 13:03     11/12/19 17:33  11/12/19 17:33  11/12/19 17:33








                                 Intake & Output











 11/11/19 11/12/19 11/13/19





 06:59 06:59 06:59


 


Intake Total   1100


 


Balance   1100


 


Weight   81.647 kg











General appearance: PRESENT: no acute distress, well-developed, well-nourished


Neck exam: ABSENT: carotid bruit, JVD, lymphadenopathy, thyromegaly


Respiratory exam: PRESENT: clear to auscultation dana.  ABSENT: rales, rhonchi, 

wheezes


Cardiovascular exam: PRESENT: RRR.  ABSENT: diastolic murmur, rubs, systolic 

murmur


Pulses: PRESENT: normal dorsalis pedis pul


Extremities exam: PRESENT: full ROM.  ABSENT: calf tenderness, clubbing, pedal 

edema


Neurological exam: PRESENT: other - Heavily sedated due to receiving be

nzodiazepine..  ABSENT: motor sensory deficit





Results


Laboratory Results: 


                                        





                                 11/12/19 15:49 





                                 11/12/19 13:14 





                                        











  11/12/19 11/12/19 11/12/19





  13:14 13:14 14:32


 


WBC  Cancelled  


 


RBC  Cancelled  


 


Hgb  Cancelled  


 


Hct  Cancelled  


 


MCV  Cancelled  


 


MCH  Cancelled  


 


MCHC  Cancelled  


 


RDW  Cancelled  


 


Plt Count  Cancelled  


 


Seg Neutrophils %  Cancelled  


 


Carbonic Acid   


 


HCO3/H2CO3 Ratio   


 


ABG pH   


 


ABG pCO2   


 


ABG pO2   


 


ABG HCO3   


 


ABG O2 Saturation   


 


ABG Base Excess   


 


FiO2   


 


Sodium   144.8 


 


Potassium   4.8 


 


Chloride   106 


 


Carbon Dioxide   < 5 L* 


 


Anion Gap   Not Reportable 


 


BUN   9 


 


Creatinine   1.48 H 


 


Est GFR ( Amer)   > 60 


 


Glucose   144 H 


 


Calcium   9.9 


 


Total Bilirubin   0.4 


 


AST   36 


 


Alkaline Phosphatase   130 H 


 


Total Protein   9.6 H 


 


Albumin   5.4 H 


 


Urine Color    STRAW


 


Urine Appearance    SLIGHTLY-CLOUDY


 


Urine pH    5.0


 


Ur Specific [place]    1.011


 


Urine Protein    100 H


 


Urine Glucose (UA)    NEGATIVE


 


Urine Ketones    NEGATIVE


 


Urine Blood    MODERATE H


 


Urine RBC (Auto)    0














  11/12/19 11/12/19





  15:49 17:06


 


WBC  18.2 H 


 


RBC  5.49 


 


Hgb  16.9 


 


Hct  50.0 


 


MCV  91 


 


MCH  30.9 


 


MCHC  33.9 


 


RDW  14.6 H 


 


Plt Count  176 


 


Seg Neutrophils %  Not Reportable 


 


Carbonic Acid   1.09


 


HCO3/H2CO3 Ratio   16:1


 


ABG pH   7.31 L


 


ABG pCO2   36.1


 


ABG pO2   78.3 L


 


ABG HCO3   17.8 L


 


ABG O2 Saturation   94.6


 


ABG Base Excess   -7.6


 


FiO2   ROOM AIR


 


Sodium  


 


Potassium  


 


Chloride  


 


Carbon Dioxide  


 


Anion Gap  


 


BUN  


 


Creatinine  


 


Est GFR (African Amer)  


 


Glucose  


 


Calcium  


 


Total Bilirubin  


 


AST  


 


Alkaline Phosphatase  


 


Total Protein  


 


Albumin  


 


Urine Color  


 


Urine Appearance  


 


Urine pH  


 


Ur Specific Gravity  


 


Urine Protein  


 


Urine Glucose (UA)  


 


Urine Ketones  


 


Urine Blood  


 


Urine RBC (Auto)  











Impressions: 


                                        





Head CT  11/12/19 14:13


IMPRESSION:  1. No acute intracranial abnormality.


2. Left frontoparietal scalp hematoma/laceration.


EVIDENCE OF ACUTE STROKE: NO.


 








Chest X-Ray  11/12/19 14:14


IMPRESSION:  Cardiomegaly without a superimposed acute cardiopulmonary process.


 














Assessment and Plan





- Diagnosis


(1) Seizure


Is this a current diagnosis for this admission?: Yes   


Plan: 


Known history of seizure disorder.  History of noncompliance.


Will to admitted to [facility], with seizure fall precautions, restart Keppra and 

benzos as needed.








(2) Metabolic acidosis


Is this a current diagnosis for this admission?: Yes   


Plan: 


Likely due to underlying seizure.


We will start on sodium bicarbonate and follow BMP.








(3) Kidney injury


Qualifiers: 


   Encounter type: subsequent encounter   Laterality: unspecified laterality   

Qualified Code(s): S37.009D - Unspecified injury of unspecified kidney, 

subsequent encounter   


Is this a current diagnosis for this admission?: Yes   


Plan: 


Prerenal.


Likely due to low p.o. intake. 


We will start on cautious volume resuscitation, monitor volume status and 

electrolytes.








(4) Hypertension


Qualifiers: 


 


Is this a current diagnosis for this admission?: Yes   


Plan: 


Known history of noncompliant.





Home meds are lisinopril.  





Admit to telemetry, PRN hydralazine and labetalol, restart home meds once kidney

function improves.  Outpatient PCP follow-up.

## 2019-11-12 NOTE — RADIOLOGY REPORT (SQ)
EXAM DESCRIPTION:  CT HEAD WITHOUT



COMPLETED DATE/TIME:  11/12/2019 3:00 pm



REASON FOR STUDY:  seizure, AMS; HTN



COMPARISON:  CT of the head without contrast from 10/18/2019.



TECHNIQUE:  Axial images acquired through the brain without intravenous contrast.  Images reviewed wi
th bone, brain and subdural windows.  Additional sagittal and coronal reconstructions were generated.
 Images stored on PACS.

All CT scanners at this facility use dose modulation, iterative reconstruction, and/or weight based d
osing when appropriate to reduce radiation dose to as low as reasonably achievable (ALARA).

[facility]: Dose Right  [facility]: CareDose    [facility]: Dose Right    [facility]: Teradose 4D    [facility]: Smart Technologies



RADIATION DOSE:  CT Rad equipment meets quality standard of care and radiation dose reduction techniq
ues were employed. CTDIvol: 48.8 mGy. DLP: 1104 mGy-cm. mGy.



LIMITATIONS:  None.



FINDINGS:  There is no acute intracranial hemorrhage, vascular territorial infarct, extra-axial fluid
 collection, mass effect or midline shift.  There is no effacement of the cerebral sulci or basal sub
arachnoid cisterns.  The gray-white matter differentiation is preserved.  The caliber of the ventricl
es is concordant with the degree of sulcation.

The orbits and globes are intact.  The paranasal sinuses and the mastoid air cells are clear.  There 
is a left frontoparietal scalp hematoma/laceration.  There is no associated fracture of the calvarium
.



IMPRESSION:  1. No acute intracranial abnormality.

2. Left frontoparietal scalp hematoma/laceration.

EVIDENCE OF ACUTE STROKE: NO.



COMMENT:  Quality ID # [id number]: Final reports with documentation of one or more dose reduction techniques
 (e.g., Automated exposure control, adjustment of the mA and/or kV according to patient size, use of 
iterative reconstruction technique)



TECHNICAL DOCUMENTATION:  JOB ID:  [id number]

 2011 [employer]- All Rights Reserved



Reading location - IP/workstation name: [email or web address]

## 2019-11-12 NOTE — ER DOCUMENT REPORT
ED Seizure





- General


Chief Complaint: Seizure


Stated Complaint: POSSIBLE SEIZURE


Time Seen by Provider: 11/12/19 14:04


Notes: 





Patient is a 33-year-old male who presents with a past medical history of 

seizures who presents emergency department after having 1 seizure at home and 4 

with EMS.  He was given 6 mg of Ativan in route to the hospital.  Patient is 

postictal at this time.  He is also sedated with the Ativan.  He is unable to 

give me any meaningful history.  Primary nurse has reported to be that the 

patient has been drinking alcohol.





- Related Data


Allergies/Adverse Reactions: 


                                        





acetaminophen [From Vicodin] Allergy (Verified 03/21/19 13:35)


   


hydrocodone [From Vicodin] Allergy (Verified 03/21/19 13:35)


   











Past Medical History





- Social History


Smoking Status: Unknown if Ever Smoked


Family History: None, Reviewed & Not Pertinent


Patient has suicidal ideation: No


Patient has homicidal ideation: No





- Past Medical History


Cardiac Medical History: Reports: Hx Hypertension


Neurological Medical History: Reports: Hx Seizures


Renal/ Medical History: Denies: Hx Peritoneal Dialysis


Psychiatric Medical History: 


   Denies: Hx Depression





- Immunizations


Immunizations up to date: No


Hx Diphtheria, Pertussis, Tetanus Vaccination: Yes





Review of Systems





- Review of Systems


-: Yes ROS unobtainable due to patient's medical condition





Physical Exam





- Vital signs


Vitals: 


                                        











Temp Resp BP Pulse Ox


 


 98.2 F   27 H  182/108 H  94 


 


 11/12/19 13:03  11/12/19 13:03  11/12/19 13:03  11/12/19 13:03














- Notes


Notes: 





PHYSICAL EXAMINATION:





GENERAL: Appears well, healthy, well-nourished, no acute distress. 





HEAD:  Normocephalic, atraumatic.





EYES:  PERRL, conjunctiva normal, all extraocular movements intact, sclera 

nonicteric





ENT:  Moist mucous membranes. 





NECK: Supple, no noticeable swelling, redness, rash.  Normal range of motion.





LUNGS: Equal breath sounds bilaterally and clear to auscultation.  No wheezes 

rales or rhonchi.





CARDIOVASCULAR: S1-S2, tachycardic, regular rhythm.  Radial pulses 2+, normal.





ABDOMEN: Normoactive bowel sounds.  Soft, nontender,  no guarding, no rebound 

tenderness, and no masses palpated.





EXTREMITIES: Normal strength and range of motion, no pitting or edema.  No 

cyanosis. 





NEUROLOGICAL: Obtunded, but able to protect airway.  





PSYCH: Normal mood, normal affect.





SKIN: Warm, dry.  No rash, lesions, ulcerations noted.  Normal skin turgor.





Course





- Re-evaluation


Re-evalutation: 


11/12/19 17:2 patient 5


Hematology shows a leukocytosis of 18,200.  In the ED due to him having a 

seizure.  No bandemia noted.  Chemistries show a bicarb less than 5.  His 

creatinine is also elevated at 4 and the patient was here he did have an acute 

kidney injury.  Urinalysis shows protein and blood in his urine.  This is most 

likely due to dehydration.  Patient received a liter of IV fluids here in the 

emergency department.  Surprisingly, the patient's alcohol level is 

nondetectable and toxicology screen is negative.  Patient was positive for 

illicit drugs.   Due to the patient having seizures and is not fully out of his 

postictal state, will call hospitalist for admission. CT of the head and chest 

x-ray are unremarkable. 


11/12/19 17:35


I spoke with Dr. Goldsmith, the patient will be admitted to [facility].











- Vital Signs


Vital signs: 


                                        











Temp Pulse Resp BP Pulse Ox


 


 99.0 F   92   16   135/75 H  99 


 


 11/13/19 19:34  11/13/19 19:34  11/13/19 19:34  11/13/19 19:34  11/13/19 19:34














- Laboratory


Result Diagrams: 


                                 11/13/19 04:27





                                 11/13/19 04:27


Laboratory results interpreted by me: 


                                        











  11/12/19 11/12/19 11/12/19





  13:14 14:32 15:49


 


WBC    18.2 H


 


RDW    14.6 H


 


Seg Neuts % (Manual)    90 H


 


Lymphocytes % (Manual)    3 L


 


Abs Neuts (Manual)    16.4 H


 


ABG pH   


 


ABG pO2   


 


ABG HCO3   


 


ABG Total CO2   


 


Carbon Dioxide  < 5 L*  


 


Creatinine  1.48 H  


 


Est GFR (MDRD) Non-Af  54 L  


 


Glucose  144 H  


 


Alkaline Phosphatase  130 H  


 


Total Protein  9.6 H  


 


Albumin  5.4 H  


 


Urine Protein   100 H 


 


Urine Blood   MODERATE H 














  11/12/19





  17:06


 


WBC 


 


RDW 


 


Seg Neuts % (Manual) 


 


Lymphocytes % (Manual) 


 


Abs Neuts (Manual) 


 


ABG pH  7.31 L


 


ABG pO2  78.3 L


 


ABG HCO3  17.8 L


 


ABG Total CO2  18.9 L


 


Carbon Dioxide 


 


Creatinine 


 


Est GFR (MDRD) Non-Af 


 


Glucose 


 


Alkaline Phosphatase 


 


Total Protein 


 


Albumin 


 


Urine Protein 


 


Urine Blood 














- EKG Interpretation by Me


Additional EKG results interpreted by me: 





11/12/19 17:36


Sinus rhythm.  Rate 92.  ; QRS 92; ; QTc 495.  No ST elevations or 

depressions noted.





Discharge





- Discharge


Clinical Impression: 


 Seizure, Dehydration





Altered mental status


Qualifiers:


 Altered mental status type: unspecified Qualified Code(s): R41.82 - Altered 

mental status, unspecified





Condition: Stable


Disposition: ADMITTED AS INPATIENT


Admitting Provider: Wilder (Hospitalist)


Unit Admitted: [facility]

## 2019-11-12 NOTE — EKG REPORT
SEVERITY:- ABNORMAL ECG -

SINUS RHYTHM

PROBABLE LEFT ATRIAL ABNORMALITY

PROBABLE LEFT VENTRICULAR HYPERTROPHY

ANTERIOR Q WAVES, POSSIBLY DUE TO LVH

PROLONGED QT INTERVAL

:

Confirmed by: Nathaly Wood MD 12-Nov-2019 16:18:45

## 2019-11-12 NOTE — ER DOCUMENT REPORT
ED Seizure





- General


Chief Complaint: Seizure


Stated Complaint: POSSIBLE SEIZURE


Time Seen by Provider: 11/12/19 14:04





- Related Data


Allergies/Adverse Reactions: 


                                        





acetaminophen [From Vicodin] Allergy (Verified 03/21/19 13:35)


   


hydrocodone [From Vicodin] Allergy (Verified 03/21/19 13:35)


   











Past Medical History





- Social History


Smoking Status: Unknown if Ever Smoked


Family History: None, Reviewed & Not Pertinent


Patient has suicidal ideation: No


Patient has homicidal ideation: No





- Past Medical History


Cardiac Medical History: Reports: Hx Hypertension


Neurological Medical History: Reports: Hx Seizures


Renal/ Medical History: Denies: Hx Peritoneal Dialysis


Psychiatric Medical History: 


   Denies: Hx Depression





- Immunizations


Immunizations up to date: No


Hx Diphtheria, Pertussis, Tetanus Vaccination: Yes





Physical Exam





- Vital signs


Vitals: 





                                        











Temp Resp BP Pulse Ox


 


 98.2 F   27 H  182/108 H  94 


 


 11/12/19 13:03  11/12/19 13:03  11/12/19 13:03  11/12/19 13:03














Course





- Vital Signs


Vital signs: 





                                        











Temp Pulse Resp BP Pulse Ox


 


 98.2 F      27 H  182/108 H  94 


 


 11/12/19 13:03     11/12/19 13:03  11/12/19 13:03  11/12/19 13:03














- Laboratory


Result Diagrams: 


                                 11/12/19 13:14





                                 11/12/19 13:14

## 2019-11-12 NOTE — RADIOLOGY REPORT (SQ)
EXAM DESCRIPTION:  CHEST SINGLE VIEW



COMPLETED DATE/TIME:  11/12/2019 3:14 pm



REASON FOR STUDY:  vomiting; eval aspiration PNA



COMPARISON:   AP view of the chest from 10/18/2019.



EXAM PARAMETERS:  NUMBER OF VIEWS: One view.

TECHNIQUE: Single frontal radiographic view of the chest acquired.

RADIATION DOSE: NA

LIMITATIONS: None.



FINDINGS:  LUNGS AND PLEURA: No consolidation, pleural effusion or pneumothorax.

MEDIASTINUM AND HILAR STRUCTURES: No mediastinal or hilar contour abnormality.

HEART AND VASCULAR STRUCTURES: The cardiac silhouette is enlarged.  The pulmonary vasculature is with
in normal limits given the low inspiratory lung volumes.

BONES: No acute findings.

HARDWARE: None in the chest.

OTHER: No other finding.



IMPRESSION:  Cardiomegaly without a superimposed acute cardiopulmonary process.



TECHNICAL DOCUMENTATION:  JOB ID:  [id number]

 2011 [employer]- All Rights Reserved



Reading location - IP/workstation name: [email or web address]

## 2019-11-13 NOTE — RADIOLOGY REPORT (SQ)
EXAM DESCRIPTION:  SHOULDER RIGHT 2 OR MORE VIEWS



COMPLETED DATE/TIME:  11/13/2019 1:51 pm



REASON FOR STUDY:  Right shoulder pain



COMPARISON:  None.



NUMBER OF VIEWS:  Three views.



TECHNIQUE:  Internal rotation, external rotation, and Y view images acquired of the right shoulder.



LIMITATIONS:  None.



FINDINGS:  MINERALIZATION: Normal.

BONES: No acute fracture.  No worrisome bone lesions.

JOINTS: No dislocation.

VISUALIZED LUNGS AND RIBS: No pneumothorax.  No rib fracture.

SOFT TISSUES: No radiopaque foreign body.

OTHER: No other significant finding.



IMPRESSION:  NEGATIVE STUDY OF THE RIGHT SHOULDER. NO RADIOGRAPHIC EVIDENCE OF ACUTE INJURY.



TECHNICAL DOCUMENTATION:  JOB ID:  [id number]

 2011 [employer]- All Rights Reserved



Reading location - IP/workstation name: [email or web address]

## 2019-11-13 NOTE — PDOC PROGRESS REPORT
Subjective


Progress Note for:: 11/13/19


Subjective:: 


TIBURCIO LOGAN is a 33 year old male past medical history of TBI, seizure 

disorder, EtOH abuse, hypertension known to be medically noncompliant recently 

discharged after sustaining a seizure brought in by ED after having a seizure at

home, and for seizure in route to the hospital.  In ED he was noted to be 

postictal, started on Keppra and hospitalist admitted for admission.





On my encounter patient is heavily sedated arousable to voice unfortunately no 

family available at the bedside.





CT head negative for any acute abnormalities, CBC positive for leukocytosis and 

CMP positive for MARKIE. 





11/13/2019.  No acute events overnight.  Patient has not had any seizures since 

admission.  On my encounter patient is sitting on the edge of his pain enjoying 

his breakfast.  Patient back to baseline.  Patient does not communicate much but

will talk to he responds however very slowly.  He stating that he is taking his 

medication does not know what caused his seizure.  Patient living with his wife 

and when asked if she is taking care of him he states yes.  Patient complaining 

of chronic right shoulder pain and some headache otherwise denies any fever, 

chills, nausea, vomiting, diarrhea, constipation or any urinary symptoms.


Reason For Visit: 


SEIZURE,DEHUDRATION,AMS








Physical Exam


Vital Signs: 


                                        











Temp Pulse Resp BP Pulse Ox


 


 98.2 F   91   16   158/107 H  99 


 


 11/13/19 12:36  11/13/19 12:36  11/13/19 12:36  11/13/19 12:36  11/13/19 12:36








                                 Intake & Output











 11/12/19 11/13/19 11/14/19





 06:59 06:59 06:59


 


Intake Total  2550 400


 


Output Total  1150 


 


Balance  1400 400


 


Weight  76.8 kg 











General appearance: PRESENT: no acute distress, well-developed, well-nourished


Head exam: PRESENT: atraumatic, normocephalic


Respiratory exam: PRESENT: clear to auscultation dana.  ABSENT: rales, rhonchi, 

wheezes


GI/Abdominal exam: PRESENT: normal bowel sounds, soft.  ABSENT: distended, 

guarding, mass, organolmegaly, rebound, tenderness


Extremities exam: PRESENT: full ROM - Right shoulder pain upon abduction..  

ABSENT: calf tenderness, clubbing, pedal edema


Neurological exam: PRESENT: alert, awake, oriented to person, oriented to place,

oriented to time, CN II-XII grossly intact.  ABSENT: motor sensory deficit





Results


Laboratory Results: 


                                        





                                 11/13/19 04:27 





                                 11/13/19 04:27 





                                        











  11/12/19 11/12/19 11/12/19





  13:14 13:14 14:32


 


WBC  Cancelled  


 


RBC  Cancelled  


 


Hgb  Cancelled  


 


Hct  Cancelled  


 


MCV  Cancelled  


 


MCH  Cancelled  


 


MCHC  Cancelled  


 


RDW  Cancelled  


 


Plt Count  Cancelled  


 


Seg Neutrophils %  Cancelled  


 


Carbonic Acid   


 


HCO3/H2CO3 Ratio   


 


ABG pH   


 


ABG pCO2   


 


ABG pO2   


 


ABG HCO3   


 


ABG O2 Saturation   


 


ABG Base Excess   


 


FiO2   


 


Sodium   144.8 


 


Potassium   4.8 


 


Chloride   106 


 


Carbon Dioxide   < 5 L* 


 


Anion Gap   Not Reportable 


 


BUN   9 


 


Creatinine   1.48 H 


 


Est GFR ( Amer)   > 60 


 


Glucose   144 H 


 


Calcium   9.9 


 


Magnesium   


 


Total Bilirubin   0.4 


 


AST   36 


 


Alkaline Phosphatase   130 H 


 


Total Protein   9.6 H 


 


Albumin   5.4 H 


 


Urine Color    STRAW


 


Urine Appearance    SLIGHTLY-CLOUDY


 


Urine pH    5.0


 


Ur Specific Rhome    1.011


 


Urine Protein    100 H


 


Urine Glucose (UA)    NEGATIVE


 


Urine Ketones    NEGATIVE


 


Urine Blood    MODERATE H


 


Urine RBC (Auto)    0














  11/12/19 11/12/19 11/12/19





  15:49 17:06 20:41


 


WBC  18.2 H  


 


RBC  5.49  


 


Hgb  16.9  


 


Hct  50.0  


 


MCV  91  


 


MCH  30.9  


 


MCHC  33.9  


 


RDW  14.6 H  


 


Plt Count  176  


 


Seg Neutrophils %  Not Reportable  


 


Carbonic Acid   1.09 


 


HCO3/H2CO3 Ratio   16:1 


 


ABG pH   7.31 L 


 


ABG pCO2   36.1 


 


ABG pO2   78.3 L 


 


ABG HCO3   17.8 L 


 


ABG O2 Saturation   94.6 


 


ABG Base Excess   -7.6 


 


FiO2   ROOM AIR 


 


Sodium    135.5 L


 


Potassium    3.1 L D


 


Chloride    99


 


Carbon Dioxide    26  D


 


Anion Gap    11


 


BUN    12


 


Creatinine    1.13


 


Est GFR ( Amer)    > 60


 


Glucose    554 H*


 


Calcium    7.8 L


 


Magnesium   


 


Total Bilirubin   


 


AST   


 


Alkaline Phosphatase   


 


Total Protein   


 


Albumin   


 


Urine Color   


 


Urine Appearance   


 


Urine pH   


 


Ur Specific Gravity   


 


Urine Protein   


 


Urine Glucose (UA)   


 


Urine Ketones   


 


Urine Blood   


 


Urine RBC (Auto)   














  11/12/19 11/13/19 11/13/19





  22:33 04:27 04:27


 


WBC   10.4 


 


RBC   5.09 


 


Hgb   16.0 


 


Hct   46.0 


 


MCV   90 


 


MCH   31.4 


 


MCHC   34.7 


 


RDW   14.5 H 


 


Plt Count   165 


 


Seg Neutrophils %   


 


Carbonic Acid   


 


HCO3/H2CO3 Ratio   


 


ABG pH   


 


ABG pCO2   


 


ABG pO2   


 


ABG HCO3   


 


ABG O2 Saturation   


 


ABG Base Excess   


 


FiO2   


 


Sodium  143.5   143.0


 


Potassium  3.8   3.2 L


 


Chloride  112 H   110 H


 


Carbon Dioxide  17 L   22


 


Anion Gap  15   11


 


BUN  13   13


 


Creatinine  1.42 H   1.42 H


 


Est GFR ( Amer)  > 60   > 60


 


Glucose  100   114 H


 


Calcium  9.2   9.0


 


Magnesium    2.8 H


 


Total Bilirubin    0.8


 


AST    33


 


Alkaline Phosphatase    104


 


Total Protein    7.8


 


Albumin    4.2


 


Urine Color   


 


Urine Appearance   


 


Urine pH   


 


Ur Specific Gravity   


 


Urine Protein   


 


Urine Glucose (UA)   


 


Urine Ketones   


 


Urine Blood   


 


Urine RBC (Auto)   











Impressions: 


                                        





Head CT  11/12/19 14:13


IMPRESSION:  1. No acute intracranial abnormality.


2. Left frontoparietal scalp hematoma/laceration.


EVIDENCE OF ACUTE STROKE: NO.


 








Chest X-Ray  11/12/19 14:14


IMPRESSION:  Cardiomegaly without a superimposed acute cardiopulmonary process.


 














Assessment and Plan





- Diagnosis


(1) Seizure


Is this a current diagnosis for this admission?: Yes   


Plan: 


Known history of seizure disorder.  History of noncompliance.


No recurrence since admission.


Switch IV Keppra to p.o.


Continue PRN benzos, fall and seizure precautions.  








(2) Metabolic acidosis


Is this a current diagnosis for this admission?: Yes   


Plan: 


Resolved.  Non-anion gap metabolic acidosis.  Likely due to underlying seizure.


Bicarbonate WNL.  Electrolytes WNL except for low potassium. 


DC bicarb drip.  Continue treating the underlying seizure.  








(3) Kidney injury


Qualifiers: 


   Encounter type: subsequent encounter   Laterality: unspecified laterality   

Qualified Code(s): S37.009D - Unspecified injury of unspecified kidney, 

subsequent encounter   


Is this a current diagnosis for this admission?: Yes   


Plan: 


Improving. Prerenal.


Likely due to low p.o. intake. 


Cautious volume resuscitation, monitor volume status and electrolytes.








(4) Hypertension


Qualifiers: 


 


Is this a current diagnosis for this admission?: Yes   


Plan: 


Improving.  Not optimized.  


Known history of noncompliant.


Home meds are lisinopril.  


Continue telemetry, PRN hydralazine and labetalol, restart home meds once kidney

function improves.  Outpatient PCP follow-up.








(5) Right shoulder pain


Qualifiers: 


   Chronicity: chronic   Qualified Code(s): M25.511 - Pain in right shoulder; 

G89.29 - Other chronic pain   


Is this a current diagnosis for this admission?: Yes   


Plan: 


Right shoulder pain from abduction.  Neurovascularly intact.


Chronic.


We will obtain x-rays.


Continue supportive measures.  We will consult orthopedic surgery if x-rays are 

positive.








(6) Hypokalemia


Is this a current diagnosis for this admission?: Yes   


Plan: 


Replenished.  No acute EKG changes.


BMP tomorrow.

## 2019-11-14 NOTE — PDOC PROGRESS REPORT
Subjective


Progress Note for:: 11/14/19


Subjective:: 


TIBURCIO LOGAN is a 33 year old male past medical history of TBI, seizure 

disorder, EtOH abuse, hypertension known to be medically noncompliant recently 

discharged after sustaining a seizure brought in by ED after having a seizure at

home, and for seizure in route to the hospital.  In ED he was noted to be 

postictal, started on Keppra and hospitalist admitted for admission.





On my encounter patient is heavily sedated arousable to voice unfortunately no 

family available at the bedside.





CT head negative for any acute abnormalities, CBC positive for leukocytosis and 

CMP positive for MARKIE. 





11/13/2019.  No acute events overnight.  Patient has not had any seizures since 

admission.  On my encounter patient is sitting on the edge of his pain enjoying 

his breakfast.  Patient back to baseline.  Patient does not communicate much but

will talk to he responds however very slowly.  He stating that he is taking his 

medication does not know what caused his seizure.  Patient living with his wife 

and when asked if she is taking care of him he states yes.  Patient complaining 

of chronic right shoulder pain and some headache otherwise denies any fever, 

chills, nausea, vomiting, diarrhea, constipation or any urinary symptoms.





11/14/2019.  No acute events overnight.  Patient Resting in bed in no apparent 

distress, has not had any recurrence of his seizures.  Patient was asked about 

his EtOH abuse stating that he does drink but he drinks only socially.  Patient 

denies any fever, chills, nausea, vomiting, diarrhea, constipation or any 

urinary symptoms.  Patient would like to be discharged home but unfortunately 

patient had one positive blood culture which is pending sensitivity.  Possible 

discharge tomorrow.


Reason For Visit: 


SEIZURE,DEHUDRATION,AMS








Physical Exam


Vital Signs: 


                                        











Temp Pulse Resp BP Pulse Ox


 


 98.3 F   81   18   156/108 H  98 


 


 11/14/19 08:30  11/14/19 08:30  11/14/19 08:30  11/14/19 08:30  11/14/19 08:30








                                 Intake & Output











 11/13/19 11/14/19 11/15/19





 06:59 06:59 06:59


 


Intake Total 2550 3140 360


 


Output Total 1150 1160 


 


Balance 1400 1980 360


 


Weight 76.8 kg 77 kg 











General appearance: PRESENT: no acute distress, well-developed, well-nourished


Respiratory exam: PRESENT: clear to auscultation dana.  ABSENT: rales, rhonchi, 

wheezes


Cardiovascular exam: PRESENT: RRR.  ABSENT: diastolic murmur, rubs, systolic 

murmur


GI/Abdominal exam: PRESENT: normal bowel sounds, soft.  ABSENT: distended, 

guarding, mass, organolmegaly, rebound, tenderness


Extremities exam: PRESENT: full ROM - Right shoulder pain upon abduction..  

ABSENT: calf tenderness, clubbing, pedal edema


Neurological exam: PRESENT: alert, awake, oriented to person, oriented to place.

 ABSENT: motor sensory deficit





Results


Laboratory Results: 


                                        





                                 11/14/19 06:02 





                                 11/14/19 07:50 





                                        











  11/14/19 11/14/19 11/14/19





  06:02 06:02 07:50


 


WBC  5.6  


 


RBC  4.98  


 


Hgb  15.4  


 


Hct  45.6  


 


MCV  92  


 


MCH  31.0  


 


MCHC  33.8  


 


RDW  14.7 H  


 


Plt Count  140 L  


 


Sodium   Cancelled  139.2


 


Potassium   Cancelled  3.6


 


Chloride   Cancelled  107


 


Carbon Dioxide   Cancelled  25


 


Anion Gap   Cancelled  7


 


BUN   Cancelled  7


 


Creatinine   Cancelled  1.05


 


Est GFR ( Amer)   Cancelled  > 60


 


Est GFR (Non-Af Amer)   Cancelled 


 


Glucose   Cancelled  94


 


Calcium   Cancelled  8.7


 


Magnesium   Cancelled  2.2


 


Total Bilirubin   Cancelled  0.7


 


AST   Cancelled  23


 


Alkaline Phosphatase   Cancelled  81


 


Total Protein   Cancelled  6.8


 


Albumin   Cancelled  3.7











Impressions: 


                                        





Head CT  11/12/19 14:13


IMPRESSION:  1. No acute intracranial abnormality.


2. Left frontoparietal scalp hematoma/laceration.


EVIDENCE OF ACUTE STROKE: NO.


 








Chest X-Ray  11/12/19 14:14


IMPRESSION:  Cardiomegaly without a superimposed acute cardiopulmonary process.


 








Shoulder X-Ray  11/13/19 13:07


IMPRESSION:  NEGATIVE STUDY OF THE RIGHT SHOULDER. NO RADIOGRAPHIC EVIDENCE OF 

ACUTE INJURY.


 














Assessment and Plan





- Diagnosis


(1) Seizure


Is this a current diagnosis for this admission?: Yes   


Plan: 


Known history of seizure disorder.  History of noncompliance as well as EtOH 

abuse.


No recurrence since admission.


Switch IV Keppra to p.o.


Continue PRN benzos, fall and seizure precautions.  








(2) Alcohol abuse


Is this a current diagnosis for this admission?: Yes   


Plan: 


Patient endorses alcohol abuse is stating that he only drinks socially.


As per wife patient has history of excessive alcohol abuse he is compliant with 

his Keppra.


EtOH abuse could be a reason for recurrent seizures while being on Keppra as 

EtOH withdrawal tend to decrease seizure threshold. 


Patient advised on alcohol abstinence.  Voiced understanding.  


Start on DT protocol.  No sign of withdrawal while inpatient.  


Will be discharged on folic acid and thiamine p.o.  








(3) Metabolic acidosis


Is this a current diagnosis for this admission?: Yes   


Plan: 


Resolved.  Non-anion gap metabolic acidosis.  Likely due to underlying seizure.


Bicarbonate WNL.  Electrolytes WNL except for low potassium. 


DC bicarb drip.  Continue treating the underlying seizure.  








(4) Kidney injury


Qualifiers: 


   Encounter type: subsequent encounter   Laterality: unspecified laterality   

Qualified Code(s): S37.009D - Unspecified injury of unspecified kidney, 

subsequent encounter   


Is this a current diagnosis for this admission?: Yes   


Plan: 


Resolved.  Renal function WNL.  Electrolytes are noted. 


Likely due to low p.o. intake. 


DC IV fluids.  Encourage p.o. intake.








(5) Hypertension


Qualifiers: 


 


Is this a current diagnosis for this admission?: Yes   


Plan: 


Euvolemic.  Improving.  Not optimized.  


Known history of noncompliant.


Home meds are lisinopril.  


Increase lisinopril to 40 mg p.o. daily.


Continue telemetry, PRN hydralazine and labetalol.


Outpatient PCP follow-up.








(6) Right shoulder pain


Qualifiers: 


   Chronicity: chronic   Qualified Code(s): M25.511 - Pain in right shoulder; 

G89.29 - Other chronic pain   


Is this a current diagnosis for this admission?: Yes   


Plan: 


Right shoulder pain from abduction.  Neurovascularly intact. Chronic.


Right shoulder x-ray 2 views no acute abnormalities.  


Continue supportive measures.








(7) Hypokalemia


Is this a current diagnosis for this admission?: Yes   


Plan: 


Resolved.








(8) Gram-positive bacteremia


Is this a current diagnosis for this admission?: Yes   


Plan: 


Gram-positive cocci in clusters 1 bottle out of 2.


Patient did present with leukocytosis however it may have been due to seizure.


Leukocytosis improved the following day without any antibiotics.


No sign of systemic infection.


Likely contamination.


Repeat blood culture.


Follow-up culture results.

## 2019-11-17 NOTE — PDOC DISCHARGE SUMMARY
Impression





- Admit/DC Date/PCP


Admission Date/Primary Care Provider: 


  11/12/19 18:28





  





Discharge Date: 11/15/19





- Discharge Diagnosis


(1) Seizure


Is this a current diagnosis for this admission?: Yes   





(2) Alcohol abuse


Is this a current diagnosis for this admission?: Yes   





(3) Metabolic acidosis


Is this a current diagnosis for this admission?: Yes   





(4) Kidney injury


Is this a current diagnosis for this admission?: Yes   





(5) Hypertension


Is this a current diagnosis for this admission?: Yes   





(6) Right shoulder pain


Is this a current diagnosis for this admission?: Yes   





(7) Hypokalemia


Is this a current diagnosis for this admission?: Yes   





(8) Gram-positive bacteremia


Is this a current diagnosis for this admission?: Yes   





- Additional Information


Discharge Diet: As Tolerated


Discharge Activity: Activity As Tolerated


Referrals: 


CHON GALVEZ MD [ACTIVE STAFF] - 11/19/19 9:00 am (New patient appointment)


Prescriptions: 


Folic Acid [Folvite 1 mg Tablet] 1 mg PO DAILY 30 Days #30 tablet


Thiamine HCl [Thiamine 100 mg Tablet] 100 mg PO DAILY 30 Days #30 tablet


Lisinopril [Zestril] 40 mg PO DAILY 30 Days #30 tablet


Home Medications: 








Levetiracetam [Keppra] 1,000 mg PO Q12 90 Days #180 tab 10/19/19 


Folic Acid [Folvite 1 mg Tablet] 1 mg PO DAILY 30 Days #30 tablet 11/14/19 


Lisinopril [Zestril] 40 mg PO DAILY 30 Days #30 tablet 11/14/19 


Thiamine HCl [Thiamine 100 mg Tablet] 100 mg PO DAILY 30 Days #30 tablet 

11/14/19 











History of Present Illiness


History of Present Illness: 


TIBURCIO LOGAN is a 33 year old male past medical history of TBI, seizure 

disorder, EtOH abuse, hypertension known to be medically noncompliant recently 

discharged after sustaining a seizure brought in by ED after having a seizure at

home, and for seizure in route to the hospital.  In ED he was noted to be 

postictal, started on Keppra and hospitalist admitted for admission.





On my encounter patient is heavily sedated arousable to voice unfortunately no 

family available at the bedside.





CT head negative for any acute abnormalities, CBC positive for leukocytosis and 

CMP positive for MARKIE. 





Hospital Course


Hospital Course: 





(1) Seizure


Known history of seizure disorder.  History of noncompliance as well as EtOH 

abuse.


No recurrence since admission.


Was a started on IV Keppra 1500 mg twice daily, benzodiazepine as needed, fall 

and seizure precautions.  


Restarted on home meds on discharge advised on medication adherence and EtOH 

abstinence.





(2) Alcohol abuse


Patient endorses alcohol abuse is stating that he only drinks socially.


As per wife patient has history of excessive alcohol abuse he is compliant with 

his Keppra.


EtOH abuse could be a reason for recurrent seizures while being on Keppra as 

EtOH withdrawal tend to decrease seizure threshold. 


Patient advised on alcohol abstinence.  Voiced understanding.  


Started on DT protocol.  No sign of withdrawal while inpatient.


Discharged on folic acid and thiamine p.o.  





(3) Metabolic acidosis


Resolved.  Non-anion gap metabolic acidosis.  Likely due to underlying seizure.


Bicarbonate WNL.  Electrolytes WNL except for low potassium. 


Was a started on bicarbonate drip.  DC'd after metabolic disorder resolved.





(4) Kidney injury


Resolved.  Renal function WNL.  Electrolytes are noted. 


Likely due to low p.o. intake.  Was started on cautious fluid resuscitation.





(5) Hypertension


Euvolemic.  Optimized.    


Known history of noncompliant.


Home meds are lisinopril 20 mg p.o. daily.


Increased lisinopril to 40 mg p.o. daily, PRN hydralazine and labetalol.


Discharged on lisinopril 40 mg p.o. daily.  Advised follow-up with PCP for read

justment of his meds.  Patient voiced understanding.  





(6) Right shoulder pain


Right shoulder pain from abduction.  Neurovascularly intact. Chronic.


Right shoulder x-ray 2 views no acute abnormalities.  


Continued supportive measures.


Outpatient orthopedic and PCP follow-up.





(7) Hypokalemia


Resolved.





(8) Gram-positive bacteremia


Staphylococcus hominis pansensitive coagulase-negative 1 bottle out of 2


Patient did present with leukocytosis however it may have been due to seizure.


Received 1 dose of Zosyn on admission.


Leukocytosis improved the following day.


No sign of systemic infection.


Likely contamination.


Repeat blood culture remained negative.





Physical Exam


Vital Signs: 


                                        











Temp Pulse Resp BP Pulse Ox


 


 98.5 F   82   16   186/118 H  94 


 


 11/15/19 12:39  11/15/19 12:39  11/15/19 12:39  11/15/19 12:39  11/15/19 12:39








                                 Intake & Output











 11/16/19 11/17/19 11/18/19





 06:59 06:59 06:59


 


Intake Total 480  


 


Output Total 1000  


 


Balance -520  











General appearance: PRESENT: no acute distress, well-developed, well-nourished


Respiratory exam: PRESENT: clear to auscultation dana.  ABSENT: rales, rhonchi, 

wheezes


Cardiovascular exam: PRESENT: RRR.  ABSENT: diastolic murmur, rubs, systolic 

murmur


Neurological exam: PRESENT: alert, awake, oriented to person, oriented to place,

oriented to time, CN II-XII grossly intact





Results


Laboratory Results: 


                                        











WBC  3.8 10^3/uL (4.0-10.5)  L  11/15/19  07:01    


 


RBC  4.39 10^6/uL (4.35-5.55)   11/15/19  07:01    


 


Hgb  13.6 g/dL (13.5-17.0)   11/15/19  07:01    


 


Hct  40.1 % (37.9-51.0)   11/15/19  07:01    


 


MCV  91 fl (80-97)   11/15/19  07:01    


 


MCH  31.0 pg (27.0-33.4)   11/15/19  07:01    


 


MCHC  34.0 g/dL (32.0-36.0)   11/15/19  07:01    


 


RDW  14.1 % (11.5-14.0)  H  11/15/19  07:01    


 


Plt Count  134 10^3/uL (150-450)  L  11/15/19  07:01    


 


Lymph % (Auto)  Not Reportable   11/12/19  15:49    


 


Mono % (Auto)  Not Reportable   11/12/19  15:49    


 


Eos % (Auto)  Not Reportable   11/12/19  15:49    


 


Baso % (Auto)  Not Reportable   11/12/19  15:49    


 


Absolute Neuts (auto)  Not Reportable   11/12/19  15:49    


 


Absolute Lymphs (auto)  Not Reportable   11/12/19  15:49    


 


Absolute Monos (auto)  Not Reportable   11/12/19  15:49    


 


Absolute Eos (auto)  Not Reportable   11/12/19  15:49    


 


Absolute Basos (auto)  Not Reportable   11/12/19  15:49    


 


Total Counted  100   11/12/19  15:49    


 


Seg Neutrophils %  Not Reportable   11/12/19  15:49    


 


Seg Neuts % (Manual)  90 % (42-78)  H  11/12/19  15:49    


 


Lymphocytes % (Manual)  3 % (13-45)  L  11/12/19  15:49    


 


Atypical Lymphs %  1 % (0)   11/12/19  15:49    


 


Monocytes % (Manual)  6 % (3-13)   11/12/19  15:49    


 


Eosinophils % (Manual)  0 % (0-6)   11/12/19  15:49    


 


Basophils % (Manual)  0 % (0-2)   11/12/19  15:49    


 


Abs Neuts (Manual)  16.4 10^3/uL (1.7-8.2)  H  11/12/19  15:49    


 


Abs Lymphs (Manual)  0.7 10^3/uL (0.5-4.7)   11/12/19  15:49    


 


Abs Monocytes (Manual)  1.1 10^3/uL (0.1-1.4)   11/12/19  15:49    


 


Absolute Eos (Manual)  0.0 10^3/uL (0.0-0.6)   11/12/19  15:49    


 


Abs Basophils (Manual)  0.0 10^3/uL (0.0-0.2)   11/12/19  15:49    


 


Platelet Estimate  Cancelled   11/12/19  13:14    


 


Platelet Comment  ADEQUATE   11/12/19  15:49    


 


Anisocytosis  SLIGHT   11/12/19  15:49    


 


Carbonic Acid  1.09 mmol/L (1.05-1.35)   11/12/19  17:06    


 


HCO3/H2CO3 Ratio  16:1   11/12/19  17:06    


 


ABG pH  7.31  (7.35-7.45)  L  11/12/19  17:06    


 


ABG pCO2  36.1 mmHg (35-45)   11/12/19  17:06    


 


ABG pO2  78.3 mmHg ()  L  11/12/19  17:06    


 


ABG HCO3  17.8 mmol/L (20-24)  L  11/12/19  17:06    


 


ABG Total CO2  18.9 mmol/L (23-27)  L  11/12/19  17:06    


 


ABG O2 Saturation  94.6 % (94-98)   11/12/19  17:06    


 


ABG Base Excess  -7.6 mmol/L  11/12/19  17:06    


 


FiO2  ROOM AIR   11/12/19  17:06    


 


Sodium  142.3 mmol/L (137-145)   11/15/19  07:01    


 


Potassium  3.6 mmol/L (3.6-5.0)   11/15/19  07:01    


 


Chloride  109 mmol/L ()  H  11/15/19  07:01    


 


Carbon Dioxide  26 mmol/L (22-30)   11/15/19  07:01    


 


Anion Gap  7  (5-19)   11/15/19  07:01    


 


BUN  8 mg/dL (7-20)   11/15/19  07:01    


 


Creatinine  1.05 mg/dL (0.52-1.25)   11/15/19  07:01    


 


Est GFR ( Amer)  > 60  (>60)   11/15/19  07:01    


 


Est GFR (Non-Af Amer)  Cancelled   11/14/19  06:02    


 


Est GFR (MDRD) Non-Af  > 60  (>60)   11/15/19  07:01    


 


Glucose  86 mg/dL ()   11/15/19  07:01    


 


POC Glucose  100 mg/dL ()   11/12/19  21:57    


 


Calcium  8.8 mg/dL (8.4-10.2)   11/15/19  07:01    


 


Magnesium  1.8 mg/dL (1.6-2.3)   11/15/19  07:01    


 


Total Bilirubin  0.7 mg/dL (0.2-1.3)   11/15/19  07:01    


 


Direct Bilirubin  0.0 mg/dL (0.0-0.4)   11/15/19  07:01    


 


Neonat Total Bilirubin  Not Reportable   11/15/19  07:01    


 


Neonat Direct Bilirubin  Not Reportable   11/15/19  07:01    


 


Neonat Indirect Bili  Not Reportable   11/15/19  07:01    


 


AST  25 U/L (17-59)   11/15/19  07:01    


 


ALT  14 U/L (<50)   11/15/19  07:01    


 


Alkaline Phosphatase  66 U/L ()   11/15/19  07:01    


 


Total Protein  6.8 g/dL (6.3-8.2)   11/15/19  07:01    


 


Albumin  3.5 g/dL (3.5-5.0)   11/15/19  07:01    


 


EGFR   Cancelled   11/14/19  06:02    


 


Urine Color  STRAW   11/12/19  14:32    


 


Urine Appearance  SLIGHTLY-CLOUDY   11/12/19  14:32    


 


Urine pH  5.0  (5.0-9.0)   11/12/19  14:32    


 


Ur Specific Gravity  1.011   11/12/19  14:32    


 


Urine Protein  100 mg/dL (NEGATIVE)  H  11/12/19  14:32    


 


Urine Glucose (UA)  NEGATIVE mg/dL (NEGATIVE)   11/12/19  14:32    


 


Urine Ketones  NEGATIVE mg/dL (NEGATIVE)   11/12/19  14:32    


 


Urine Blood  MODERATE  (NEGATIVE)  H  11/12/19  14:32    


 


Urine Nitrite (Reflex)  NEGATIVE  (NEGATIVE)   11/12/19  14:32    


 


Urine Bilirubin  NEGATIVE  (NEGATIVE)   11/12/19  14:32    


 


Urine Urobilinogen  NEGATIVE mg/dL (<2.0)   11/12/19  14:32    


 


Leukocyte Esterase Rfl  NEGATIVE  (NEGATIVE)   11/12/19  14:32    


 


Urine RBC (Auto)  0 /HPF  11/12/19  14:32    


 


Urine Bacteria (Auto)  TRACE /HPF  11/12/19  14:32    


 


Urine WBC (Reflex)  1 /HPF  11/12/19  14:32    


 


Squamous Epi Cells Auto  <1 /HPF  11/12/19  14:32    


 


Urine Mucus (Auto)  RARE /LPF  11/12/19  14:32    


 


Urine Ascorbic Acid  NEGATIVE  (NEGATIVE)   11/12/19  14:32    


 


Urine Opiates Screen  NEGATIVE   11/12/19  14:32    


 


Urine Methadone Screen  NEGATIVE   11/12/19  14:32    


 


Ur Barbiturates Screen  NEGATIVE   11/12/19  14:32    


 


Ur Phencyclidine Scrn  NEGATIVE   11/12/19  14:32    


 


Ur Amphetamines Screen  NEGATIVE   11/12/19  14:32    


 


U Benzodiazepines Scrn  NEGATIVE   11/12/19  14:32    


 


Urine Cocaine Screen  NEGATIVE   11/12/19  14:32    


 


U Marijuana (THC) Screen  NEGATIVE   11/12/19  14:32    


 


Serum Alcohol  < 10 mg/dL (NONE DETECTED)   11/12/19  13:14    


 


Slides for Path Review  Cancelled   11/12/19  13:14    











Impressions: 


                                        





Head CT  11/12/19 14:13


IMPRESSION:  1. No acute intracranial abnormality.


2. Left frontoparietal scalp hematoma/laceration.


EVIDENCE OF ACUTE STROKE: NO.


 








Chest X-Ray  11/12/19 14:14


IMPRESSION:  Cardiomegaly without a superimposed acute cardiopulmonary process.


 








Shoulder X-Ray  11/13/19 13:07


IMPRESSION:  NEGATIVE STUDY OF THE RIGHT SHOULDER. NO RADIOGRAPHIC EVIDENCE OF 

ACUTE INJURY.


 














Stroke


Is this a Stroke Patient?: No





Acute Heart Failure





- **


Is this a Heart Failure Patient?: No

## 2019-12-03 NOTE — RADIOLOGY REPORT (SQ)
EXAM DESCRIPTION:  CT HEAD WITHOUT



COMPLETED DATE/TIME:  12/3/2019 4:38 pm



REASON FOR STUDY:  ams



COMPARISON:  11/12/2019



TECHNIQUE:  Axial images acquired through the brain without intravenous contrast.  Images reviewed wi
th bone, brain and subdural windows.  Additional sagittal and coronal reconstructions were generated.
 Images stored on PACS.

All CT scanners at this facility use dose modulation, iterative reconstruction, and/or weight based d
osing when appropriate to reduce radiation dose to as low as reasonably achievable (ALARA).

[facility]: Dose Right  [facility]: CareDose    [facility]: Dose Right    [facility]: Teradose 4D    [facility]: Smart Technologies



RADIATION DOSE:  CT Rad equipment meets quality standard of care and radiation dose reduction techniq
ues were employed. CTDIvol: 53.2 mGy. DLP: 1230 mGy-cm. mGy.



LIMITATIONS:  None.



FINDINGS:  VENTRICLES: Normal size and contour.

CEREBRUM: No masses.  No hemorrhage.  No midline shift.  No evidence for acute infarction. Normal gra
y/white matter differentiation. No areas of low density in the white matter.

CEREBELLUM: No masses.  No hemorrhage.  No alteration of density.  No evidence for acute infarction.

EXTRAAXIAL SPACES: No fluid collections.  No masses.

ORBITS AND GLOBE: No intra- or extraconal masses.  Normal contour of globe without masses.

CALVARIUM: No fracture.

PARANASAL SINUSES: No fluid or mucosal thickening.

SOFT TISSUES: Mild soft tissue swelling remains overlying the left frontal bone.  This is improved fr
om prior study.

OTHER: No other significant finding.



IMPRESSION:  NORMAL BRAIN CT WITHOUT CONTRAST.

EVIDENCE OF ACUTE STROKE: NO.



COMMENT:  Quality ID # 436: Final reports with documentation of one or more dose reduction techniques
 (e.g., Automated exposure control, adjustment of the mA and/or kV according to patient size, use of 
iterative reconstruction technique)



TECHNICAL DOCUMENTATION:  JOB ID:  [id number]

 2011 [employer]- All Rights Reserved



Reading location - IP/workstation name: [email or web address]

## 2019-12-03 NOTE — OPERATIVE REPORT
Bedside Procedure





- History of Present Illness


History of Present Illness: 


33-year-old black male with known history of seizure disorder since childhood, 

traumatic brain injury, with significant alcohol abuse disorder.  Admitted 

multiple times and in fact this admission is representative of previous 

admission except for severe lactic acidosis and prolonged alteration of mental 

status.


I was able to speak with the emergency room staff and the patient's wife 

Lucia Jha phone #[phone]


According to the wife the patient had his typical beer binge late last evening. 

He has not been through any withdrawal patterns.


She states that he awoke in his normal state but had a seizure that lasted less 

than 3 minutes.  He had a very short-lived postictal state and then had a short 

seizure.  After this he remained seizure-free walking and talking acting normal.

 Approximately 1 and half hours after the first seizure he had another prolonged

seizure.  EMS was called he was given Versed.  He was noted to be hypotensive in

the emergency room and received 3 L of fluid.  He also had a significant lactic 

acidosis and bicarbonate was low as well.  He did awaken in the emergency room 

and was somewhat agitated.  He did complain of right shoulder discomfort 

examination was unremarkable and x-ray was unremarkable as well.  ED staff 

confirms that he was in his normal state however he became agitated and gave him

more Ativan.





Evaluated patient in the emergency room however he was too lethargic to provide 

any history or complaints.  His heart rate was 117 with a blood pressure of 

186/130 saturation 99% breathing at 18 breaths/min.  Cardene drip was started.  

He was arousable and would follow commands but extremely lethargic.  His exam 

following was not consistent.  Glascow coma scale on my initial evaluation was 

2-2-6 intermittent. 





He required multiple medications and had no available IV access. His wife gave 

consent over the phone


Indication for Procedure: Seizure and Hypertensive urgency, need for IV access


Date: 12/03/19


Provider: BELLA LILLY





- Central Line


  ** Right Internal jugular


Time completed: 18:02


Consent obtained: Yes - verbal over phone from wifeLucia


Central line pre-insertion: Sterile PPE donned, Chloraprep applied, Sterile 

drapes applied


Central line size (Fr.): 7


Central line lumen type: Triple


Anesthetic type: 1% Lidocaine


mL's of anesthesia: 3


Ultrasound guided: Yes


CM at insertion site: 15


Line secured with sutures: Yes


Central line post-insertion: Blood return from lumens, Biopatch applied, 

Sutured, Sterile dressing applied, Other - Seen on ultrasound.


Number of attempts: 1


Complications: No

## 2019-12-03 NOTE — RADIOLOGY REPORT (SQ)
EXAM DESCRIPTION:  CHEST SINGLE VIEW



COMPLETED DATE/TIME:  12/3/2019 6:21 pm



REASON FOR STUDY:  line placement



COMPARISON:  12/3/2019



EXAM PARAMETERS:  NUMBER OF VIEWS: One view.

TECHNIQUE: Single frontal radiographic view of the chest acquired.

RADIATION DOSE: NA

LIMITATIONS: None.



FINDINGS:  LUNGS AND PLEURA: No opacities, masses or pneumothorax. No pleural effusion.

MEDIASTINUM AND HILAR STRUCTURES: No masses.  Contour normal.

HEART AND VASCULAR STRUCTURES: Heart normal in size.  Normal vasculature.

BONES: No acute findings.

HARDWARE: Right internal jugular catheter has its tip in the superior vena cava.

OTHER: No other significant finding.



IMPRESSION:  Right internal jugular catheter placement.



TECHNICAL DOCUMENTATION:  JOB ID:  [id number]

 2011 [employer]- All Rights Reserved



Reading location - IP/workstation name: [email or web address]

## 2019-12-03 NOTE — RADIOLOGY REPORT (SQ)
EXAM DESCRIPTION:  HUMERUS RIGHT



COMPLETED DATE/TIME:  12/3/2019 12:04 pm



REASON FOR STUDY:  arm pain



COMPARISON:  None.



NUMBER OF VIEWS:  Two views.



TECHNIQUE:  Two radiographic images were acquired of the right humerus to include elbow and shoulder 
in at least one projection.



LIMITATIONS:  None.



FINDINGS:  MINERALIZATION: Normal.

BONES: No acute fracture or dislocation.  No worrisome bone lesions.

SOFT TISSUES: No obvious swelling or foreign body.

OTHER: No other significant finding.



IMPRESSION:  NEGATIVE STUDY OF THE RIGHT HUMERUS. NO RADIOGRAPHIC EVIDENCE OF ACUTE INJURY.



TECHNICAL DOCUMENTATION:  JOB ID:  [id number]

 2011 [employer]- All Rights Reserved



Reading location - IP/workstation name: [email or web address]

## 2019-12-03 NOTE — EKG REPORT
SEVERITY:- ABNORMAL ECG -

SINUS TACHYCARDIA

AVELINO, CONSIDER BIATRIAL ABNORMALITIES

LEFT VENTRICULAR HYPERTROPHY

ST ELEV, PROBABLE NORMAL EARLY REPOL PATTERN

TALL T, CONSIDER METABOLIC/ISCHEMIC ABNRM

BORDERLINE PROLONGED QT INTERVAL

:

Confirmed by: Nathaly Wood MD 03-Dec-2019 14:32:09

## 2019-12-03 NOTE — RADIOLOGY REPORT (SQ)
EXAM DESCRIPTION:  CHEST SINGLE VIEW



COMPLETED DATE/TIME:  12/3/2019 10:10 am



REASON FOR STUDY:  ams



COMPARISON:  11/12/2019



EXAM PARAMETERS:  NUMBER OF VIEWS: One view.

TECHNIQUE: Single frontal radiographic view of the chest acquired.

RADIATION DOSE: NA

LIMITATIONS: None.



FINDINGS:  LUNGS AND PLEURA: No opacities, masses or pneumothorax. No pleural effusion.

MEDIASTINUM AND HILAR STRUCTURES: No masses.  Contour normal.

HEART AND VASCULAR STRUCTURES: Heart normal in size.  Normal vasculature.

BONES: No acute findings.

HARDWARE: None in the chest.

OTHER: No other significant finding.



IMPRESSION:  NO ACUTE RADIOGRAPHIC FINDING IN THE CHEST.



TECHNICAL DOCUMENTATION:  JOB ID:  [id number]

 2011 [employer]- All Rights Reserved



Reading location - IP/workstation name: [email or web address]

## 2019-12-03 NOTE — CRITICAL CARE ADMISSION REPORT
HPI


Date:: 12/03/19


Time:: 15:30


Reason for ICU Reason:: Seizure with hypertension


HPI: 


33-year-old black male with known history of seizure disorder since childhood, 

traumatic brain injury, with significant alcohol abuse disorder.  Admitted 

multiple times and in fact this admission is representative of previous 

admission except for severe lactic acidosis and prolonged alteration of mental 

status.


I was able to speak with the emergency room staff and the patient's wife 

Lucia Jha phone #[phone]


According to the wife the patient had his typical beer binge late last evening. 

He has not been through any withdrawal patterns.


She states that he awoke in his normal state but had a seizure that lasted less 

than 3 minutes.  He had a very short-lived postictal state and then had a short 

seizure.  After this he remained seizure-free walking and talking acting normal.

 Approximately 1 and half hours after the first seizure he had another prolonged

seizure.  EMS was called he was given Versed.  He was noted to be hypotensive in

the emergency room and received 3 L of fluid.  He also had a significant lactic 

acidosis and bicarbonate was low as well.  He did awaken in the emergency room 

and was somewhat agitated.  He did complain of right shoulder discomfort 

examination was unremarkable and x-ray was unremarkable as well.  ED staff 

confirms that he was in his normal state however he became agitated and gave him

more Ativan.





Evaluated patient in the emergency room however he was too lethargic to provide 

any history or complaints.  His heart rate was 117 with a blood pressure of 

186/130 saturation 99% breathing at 18 breaths/min.  Cardene drip was started.  

He was arousable and would follow commands but extremely lethargic.  His exam 

following was not consistent.  Glascow coma scale on my initial evaluation was 

2-2-6 intermittent. 





- Diagnosis/Plan


(1) Status epilepticus, generalized convulsive


Is this a current diagnosis for this admission?: Yes   





(2) Lactic acidosis


Is this a current diagnosis for this admission?: Yes   





(3) Acute kidney failure


Qualifiers: 


   Acute renal failure type: with acute renal cortical necrosis   Qualified 

Code(s): N17.1 - Acute kidney failure with acute cortical necrosis   


Is this a current diagnosis for this admission?: Yes   





(4) Encephalopathy acute


Is this a current diagnosis for this admission?: Yes   





(5) Delirium due to another medical condition, acute, hypoactive


Is this a current diagnosis for this admission?: Yes   





(6) Alcohol abuse


Is this a current diagnosis for this admission?: Yes   





(7) Tobacco abuse


Is this a current diagnosis for this admission?: Yes   





(8) Rhabdomyolysis


Qualifiers: 


   Rhabdomyolysis type: traumatic   Encounter type: initial encounter   

Qualified Code(s): T79.6XXA - Traumatic ischemia of muscle, initial encounter   


Is this a current diagnosis for this admission?: Yes   


Plan: 


Suspected. Await Ck. From excessive muscle activity








- .


Plan Summary: 


Will admit the patient to the ICU.  Have placed him on a D5 modified bicarbonate

drip as a resuscitation fluid to prevent renal failure.


Have increased his Keppra to 1500 mg twice daily.  Unfortunately I am unable to 

get a baseline Keppra because the patient was given a bolus loading dose in the 

emergency room.


Of importance is his encephalopathy and delirium.  He was given Ativan and I am 

concerned about aspiration and neurological status.


He also has hypertension that is difficult to control.  He is not taking 

anything p.o. and so we have started a Cardene drip.


Important follow-up items will be to check creatinine kinase to evaluate the 

degree of rhabdomyolysis.


We will follow his pH and bicarbonate status.


In that the patient had a full neurological recovery and has had multiple CAT 

scans (greater than 9), will wait on ordering any imaging studies unless the 

patient has a neurological deficits.


This does not appear to be alcohol withdrawal related but may be related to 

alcohol excess.  Will check alcohol level as well.


Will need to have neurological follow-up as well.








Past Medical History


Cardiac Medical History: Reports: Hypertension


Pulmonary Medical History: Reports: None


EENT Medical History: Reports: None


Neurological Medical History: Reports: Seizures


Endocrine Medical History: Reports: None


Renal/ Medical History: Reports: None


Malignancy Medical History: Reports: None


GI Medical History: Reports: None


Musculoskeltal Medical History: Reports: None


Skin Medical History: Reports: None


Psychiatric Medical History: Reports: Alcohol Dependency, Tobacco Dependency


   Denies: Depression


Traumatic Medical History: Reports: Traumatic Brain Injury


Hematology: Reports: None


Infectious Medical History: Reports: None





Social/Family History





- Social History


Lives with: Spouse/Significant other - Lucia Jha: [phone]


Smoking Status: Unknown if Ever Smoked


Frequency of Alcohol Use: Occasional


Hx Recreational Drug Use: No


Drugs: None


Hx Prescription Drug Abuse: No





- Family History


Family History: 





Unable to obtain





- Medication/Allergies


Home Medications: 








Folic Acid [Folvite 1 mg Tablet] 1 mg PO DAILY 12/03/19 


Levetiracetam [Keppra] 1,000 mg PO Q12 12/03/19 


Lisinopril [Prinivil 40 mg Tablet] 40 mg PO DAILY 12/03/19 


Thiamine HCl [Thiamine 100 mg Tablet] 100 mg PO DAILY 12/03/19 








Allergies/Adverse Reactions: 


                                        





hydrocodone [From Vicodin] Allergy (Verified 03/21/19 13:35)


   











Review of Systems


ROS unobtainable: Due to mental status





Physical Exam


Vital Signs: 


                                        











Temp Pulse Resp BP Pulse Ox


 


 99.1 F      9 L  188/131 H  98 


 


 12/03/19 14:00     12/03/19 14:21  12/03/19 14:21  12/03/19 14:21








                                 Intake & Output











 12/02/19 12/03/19 12/04/19





 06:59 06:59 06:59


 


Intake Total   3710


 


Balance   3710


 


Weight   87.1 kg








                                  Weight/Height





Weight                           87.1 kg





                                        





Chest X-Ray  12/03/19 09:22


IMPRESSION:  NO ACUTE RADIOGRAPHIC FINDING IN THE CHEST.


 








Humerus X-Ray  12/03/19 10:39


IMPRESSION:  NEGATIVE STUDY OF THE RIGHT HUMERUS. NO RADIOGRAPHIC EVIDENCE OF 

ACUTE INJURY.


 











General appearance: PRESENT: no acute distress, disheveled, well-developed, 

well-nourished


Exam: 





Well-developed well-nourished nontoxic older appearing 33-year-old black male no

active distress.  He is lethargic arousable only to loud voice and noxious 

stimulus.  Over time he did follow some commands but not consistently.


Head exam: PRESENT: atraumatic, normocephalic


Eye exam: PRESENT: conjunctival injection, conjunctiva pink, PERRLA, other - No 

papilledema.  ABSENT: conjunctiva pale, scleral icterus


Ear exam: PRESENT: normal external ear exam.  ABSENT: bleeding


Mouth exam: PRESENT: dry mucosa


Teeth exam: PRESENT: poor dentation


Neck exam: ABSENT: carotid bruit, JVD, lymphadenopathy, meningismus, tenderness,

thyromegaly


Respiratory exam: PRESENT: clear to auscultation dana, unlabored.  ABSENT: 

accessory muscle use, rales, rhonchi, tachypnea, wheezes


Cardiovascular exam: PRESENT: RRR, +S1, +S2, tachycardia


Pulses: PRESENT: +2 pedal pulses bilateral


Vascular exam: PRESENT: normal capillary refill.  ABSENT: pallor


GI/Abdominal exam: PRESENT: normal bowel sounds, soft.  ABSENT: distended, 

guarding, mass, organolmegaly, rebound, tenderness


Rectal exam: PRESENT: deferred


Gentrourinary exam: ABSENT: lesions, scrotal swelling, urethral discharge


Extremities exam: ABSENT: tenderness


Musculoskeletal exam: PRESENT: normal inspection.  ABSENT: deformity, 

dislocation


Neurological exam: PRESENT: altered - GCS: 2-2-6. No focal deficits. Moving all 

extremities. Follows commands intermittently


Skin exam: PRESENT: intact.  ABSENT: abrasion, jaundice, mottled, normal color, 

petechiae, urticaria, vesicles


Tubes/Lines: ABSENT: Endotracheal Tube, Chest Tube, Central Line, Arterial 

Catheter, Dialysis catheter, Peg Tube, Nasogastic Tube, Other





Laboratory/Radiographs


Laboratory Results: 


                                        





                                 12/03/19 11:09 





                                 12/03/19 11:09 





                                        











  12/03/19 12/03/19 12/03/19





  08:53 08:53 09:30


 


WBC  Cancelled  


 


RBC  Cancelled  


 


Hgb  Cancelled  


 


Hct  Cancelled  


 


MCV  Cancelled  


 


MCH  Cancelled  


 


MCHC  Cancelled  


 


RDW  Cancelled  


 


Plt Count  Cancelled  


 


Seg Neutrophils %  Cancelled  


 


Carbonic Acid   


 


HCO3/H2CO3 Ratio   


 


ABG pH   


 


ABG pCO2   


 


ABG pO2   


 


ABG HCO3   


 


ABG O2 Saturation   


 


ABG Base Excess   


 


VBG pH    Cancelled


 


VBG pCO2    Cancelled


 


VBG HCO3    Cancelled


 


VBG Base Excess    Cancelled


 


FiO2   


 


Sodium   Cancelled 


 


Potassium   Cancelled 


 


Chloride   Cancelled 


 


Carbon Dioxide   Cancelled 


 


Anion Gap   Cancelled 


 


BUN   Cancelled 


 


Creatinine   Cancelled 


 


Est GFR ( Amer)   Cancelled 


 


Est GFR (Non-Af Amer)   Cancelled 


 


Glucose   Cancelled 


 


Calcium   Cancelled 


 


Total Bilirubin   Cancelled 


 


AST   Cancelled 


 


Alkaline Phosphatase   Cancelled 


 


Total Protein   Cancelled 


 


Albumin   Cancelled 


 


Urine Color   


 


Urine Appearance   


 


Urine pH   


 


Ur Specific Gravity   


 


Urine Protein   


 


Urine Glucose (UA)   


 


Urine Ketones   


 


Urine Blood   














  12/03/19 12/03/19 12/03/19





  11:09 11:09 13:40


 


WBC   23.2 H 


 


RBC   5.60 H 


 


Hgb   17.4 H 


 


Hct   53.2 H 


 


MCV   95  D 


 


MCH   31.0 


 


MCHC   32.6 


 


RDW   14.4 H 


 


Plt Count   293 


 


Seg Neutrophils %   Not Reportable 


 


Carbonic Acid   


 


HCO3/H2CO3 Ratio   


 


ABG pH   


 


ABG pCO2   


 


ABG pO2   


 


ABG HCO3   


 


ABG O2 Saturation   


 


ABG Base Excess   


 


VBG pH   


 


VBG pCO2   


 


VBG HCO3   


 


VBG Base Excess   


 


FiO2   


 


Sodium  145.3 H  


 


Potassium  4.3  


 


Chloride  110 H  


 


Carbon Dioxide  9 L*  


 


Anion Gap  26 H  


 


BUN  10  


 


Creatinine  2.16 H  


 


Est GFR ( Amer)  43 L  


 


Est GFR (Non-Af Amer)   


 


Glucose  161 H  


 


Calcium  10.5 H  


 


Total Bilirubin  0.5  


 


AST  49  


 


Alkaline Phosphatase  137 H  


 


Total Protein  9.1 H  


 


Albumin  5.0  


 


Urine Color    YELLOW


 


Urine Appearance    SLIGHTLY-CLOUDY


 


Urine pH    5.0


 


Ur Specific Gravity    1.010


 


Urine Protein    100 H


 


Urine Glucose (UA)    NEGATIVE


 


Urine Ketones    NEGATIVE


 


Urine Blood    MODERATE H














  12/03/19





  14:10


 


WBC 


 


RBC 


 


Hgb 


 


Hct 


 


MCV 


 


MCH 


 


MCHC 


 


RDW 


 


Plt Count 


 


Seg Neutrophils % 


 


Carbonic Acid  1.16


 


HCO3/H2CO3 Ratio  17:1


 


ABG pH  7.34 L


 


ABG pCO2  38.4


 


ABG pO2  77.1 L


 


ABG HCO3  20.3


 


ABG O2 Saturation  94.8


 


ABG Base Excess  -4.9


 


VBG pH 


 


VBG pCO2 


 


VBG HCO3 


 


VBG Base Excess 


 


FiO2  ROOM AIR


 


Sodium 


 


Potassium 


 


Chloride 


 


Carbon Dioxide 


 


Anion Gap 


 


BUN 


 


Creatinine 


 


Est GFR ( Amer) 


 


Est GFR (Non-Af Amer) 


 


Glucose 


 


Calcium 


 


Total Bilirubin 


 


AST 


 


Alkaline Phosphatase 


 


Total Protein 


 


Albumin 


 


Urine Color 


 


Urine Appearance 


 


Urine pH 


 


Ur Specific Gravity 


 


Urine Protein 


 


Urine Glucose (UA) 


 


Urine Ketones 


 


Urine Blood 








                                        











  12/03/19





  08:53


 


Troponin I  0.022











Impressions: 


                                        





Chest X-Ray  12/03/19 09:22


IMPRESSION:  NO ACUTE RADIOGRAPHIC FINDING IN THE CHEST.


 








Humerus X-Ray  12/03/19 10:39


IMPRESSION:  NEGATIVE STUDY OF THE RIGHT HUMERUS. NO RADIOGRAPHIC EVIDENCE OF 

ACUTE INJURY.


 











All labs, radiographs, diagnostic studies and EKGs were personally reviewed: Yes


In addition, reports of radiographic and diagnostic studies were read: Yes





Critical Time


Critical Time (minutes): 65


-: 


The care of a critically ill patient is dynamic.  This note represents a static 

moment in the admission process. orders and treatments may be given 

simultaneously and urgently, and time is not representative of the treatment 

process.





This patient requires Critical Care secondary to life threatening organ or limb 

dysfunction.  Without the need for Critical Care services, the patient is at 

risk for increased mortality and morbidity.


MPOA: Lucia Abhijeet: [phone]

## 2019-12-03 NOTE — ER DOCUMENT REPORT
Entered by HANNAH GENAO SCRIBE  12/03/19 0927 





Acting as scribe for:JOSE F WEBER DO





ED Seizure





- General


Chief Complaint: Seizure


Stated Complaint: POSSIBLE SEIZURE


Time Seen by Provider: 12/03/19 09:21


Mode of Arrival: Medic


Information source: Emergency Med Personnel


Cannot obtain history due to: Altered mental status


Notes: 





This 33 year old male patient presents to the emergency department today with 

complaints of seizures prior to arrival. EMS administered 2.5 mg of Versed in 

route to this facility due to continued seizures. Patient is on keppra for 

seizures. Patient is postictal here and unable to provide any history. EMS 

reports that family on scene reported that the patient has been drinking EtOH 

heavily the last few days.





- Related Data


Allergies/Adverse Reactions: 


                                        





hydrocodone [From Vicodin] Allergy (Verified 03/21/19 13:35)


   











Past Medical History





- General


Information source: [facility] Records


Cannot obtain history due to: Altered mental status





- Social History


Smoking Status: Smoker,Current Status Unk


Frequency of alcohol use: Heavy


Family History: None, Reviewed & Not Pertinent


Patient has suicidal ideation: No


Patient has homicidal ideation: No





- Past Medical History


Cardiac Medical History: Reports: Hx Hypertension


Neurological Medical History: Reports: Hx Seizures





- Immunizations


Immunizations up to date: No


Hx Diphtheria, Pertussis, Tetanus Vaccination: Yes





Review of Systems





- Review of Systems


-: Yes ROS unobtainable due to patient's medical condition





Physical Exam





- Vital signs


Vitals: 


                                        











Resp BP Pulse Ox


 


 39 H  98/62 L  92 


 


 12/03/19 08:54  12/03/19 08:54  12/03/19 08:54











Interpretation: Hypotensive





- Notes


Notes: 





Physical Exam:


 


General: Drowsy, difficult to arouse. 


 


HEENT: Normocephalic. Atraumatic. PERRL. 





Neck: Supple. Non-tender.


 


Respiratory: No respiratory distress. Clear and equal breath sounds bilaterally.


 


Cardiovascular: Regular rate and rhythm. 


 


Abdominal: Normal Inspection. Non-tender. No distension. Normal Bowel Sounds. 


 


Back: No gross abnormalities. 


 


Extremities: Moves all four extremities.


Upper extremities: Normal inspection. Normal ROM.  


Lower extremities: Normal inspection. No edema. Normal ROM.


 


Neurological: Drowsy, difficult to arouse, GCS of 3.   


 


Skin: Warm. Dry. Normal color.





Course





- Re-evaluation


Re-evalutation: 





12/03/19 10:28


Patient is alert and talking now, complaining of right shoulder pain.





12/03/19 14:01


Patient is a 33-year-old male with a history of seizure disorder who was 

drinking last night.  Patient has a history of hypertension has been admitted 

for high blood pressure seizures in the past.  Concern is that this is more 

likely alcohol withdrawal leading to seizures from alcohol withdrawal.  Patient 

was initially hypotensive likely from Versed given prior to arrival.  Responded 

well to fluid bolus.  Patient was then awake and talking but becoming 

tachycardic and hypertensive.  Serum alcohol 17.  Patient was given Ativan for 

symptoms and became less tachycardic and less hypertensive.  His initial lactic 

was 16 likely from seizure activity and dehydration.  He has an MARKIE today.  

Lactic acid is downtrending with IV fluids.  Patient was given normal saline and

then half-normal saline when his sodium resulted at 145.  Please note, blood 

work that is done was 2 hours after presentation due to hemolyzation of initial 

blood work although it is quite possible that he had severe metabolic 

derangement at the time so it was not reported.  Regardless, patient is trending

in the right direction.  Given his history of alcohol abuse with likely alcohol 

withdrawal seizures, he will require ICU admission.  He has been discussed with 

the intensivist, Dr. Patton and is accepted for admission.  He is stable at 

this time and has had no further seizures here in the emergency department.  He 

was loaded with Keppra 1500 mg and has seizure precautions in place.  Of note, 

head CT within normal limits.








- Vital Signs


Vital signs: 


                                        











Temp Pulse Resp BP Pulse Ox


 


 99.1 F      9 L  188/131 H  98 


 


 12/03/19 14:00     12/03/19 14:21  12/03/19 14:21  12/03/19 14:21














- Laboratory


Result Diagrams: 


                                 12/03/19 11:09





                                 12/03/19 11:09


Laboratory results interpreted by me: 


                                        











  12/03/19 12/03/19 12/03/19





  08:53 11:09 11:09


 


WBC   


 


RBC   


 


Hgb   


 


Hct   


 


RDW   


 


Seg Neuts % (Manual)   


 


Lymphocytes % (Manual)   


 


Abs Neuts (Manual)   


 


PT  18.2 H  


 


ABG pH   


 


ABG pO2   


 


ABG Total CO2   


 


Sodium    145.3 H


 


Chloride    110 H


 


Carbon Dioxide    9 L*


 


Anion Gap    26 H


 


Creatinine    2.16 H


 


Est GFR ( Amer)    43 L


 


Est GFR (MDRD) Non-Af    35 L


 


Glucose    161 H


 


Lactic Acid (Sepsis)   16.4 H 


 


Calcium    10.5 H


 


Alkaline Phosphatase    137 H


 


Total Protein    9.1 H


 


Urine Protein   


 


Urine Blood   














  12/03/19 12/03/19 12/03/19





  11:09 13:40 14:10


 


WBC  23.2 H  


 


RBC  5.60 H  


 


Hgb  17.4 H  


 


Hct  53.2 H  


 


RDW  14.4 H  


 


Seg Neuts % (Manual)  80 H  


 


Lymphocytes % (Manual)  10 L  


 


Abs Neuts (Manual)  19.3 H  


 


PT   


 


ABG pH    7.34 L


 


ABG pO2    77.1 L


 


ABG Total CO2    21.5 L


 


Sodium   


 


Chloride   


 


Carbon Dioxide   


 


Anion Gap   


 


Creatinine   


 


Est GFR ( Amer)   


 


Est GFR (MDRD) Non-Af   


 


Glucose   


 


Lactic Acid (Sepsis)   


 


Calcium   


 


Alkaline Phosphatase   


 


Total Protein   


 


Urine Protein   100 H 


 


Urine Blood   MODERATE H 














  12/03/19





  14:41


 


WBC 


 


RBC 


 


Hgb 


 


Hct 


 


RDW 


 


Seg Neuts % (Manual) 


 


Lymphocytes % (Manual) 


 


Abs Neuts (Manual) 


 


PT 


 


ABG pH 


 


ABG pO2 


 


ABG Total CO2 


 


Sodium 


 


Chloride 


 


Carbon Dioxide 


 


Anion Gap 


 


Creatinine 


 


Est GFR ( Amer) 


 


Est GFR (MDRD) Non-Af 


 


Glucose 


 


Lactic Acid (Sepsis)  3.3 H


 


Calcium 


 


Alkaline Phosphatase 


 


Total Protein 


 


Urine Protein 


 


Urine Blood 














Critical Care Note





- Critical Care Note


Total time excluding time spent on procedures (mins): 90 - Evaluation and ma

nagement of persistent seizure activity, altered mental status, metabolic 

derangement including MARKIE and elevated lactic acid, treatment of patient, 

coordination of admission, multiple re-evaluations





Discharge





- Discharge


Clinical Impression: 


 Seizure, Encephalopathy acute, Lactic acidosis





Altered mental status


Qualifiers:


 Altered mental status type: unspecified Qualified Code(s): R41.82 - Altered 

mental status, unspecified





Acute kidney failure


Qualifiers:


 Acute renal failure type: with acute renal cortical necrosis Qualified Code(s):

N17.1 - Acute kidney failure with acute cortical necrosis





Alcohol withdrawal seizure


Qualifiers:


 Complication of substance-induced condition: with unspecified complication Qu

alified Code(s): F10.239 - Alcohol dependence with withdrawal, unspecified; 

R56.9 - Unspecified convulsions





Condition: Stable


Disposition: ADMITTED AS INPATIENT


Admitting Provider: Abdi (Intensivist)


Unit Admitted: ICU





I personally performed the services described in the documentation, reviewed and

edited the documentation which was dictated to the scribe in my presence, and it

accurately records my words and actions.

## 2019-12-04 NOTE — RADIOLOGY REPORT (SQ)
EXAM DESCRIPTION: 



XR CHEST 1 VIEW



COMPLETED DATE/TME:  12/04/2019 00:00



CLINICAL HISTORY: 



33 years, Male, line pulled



COMPARISON:

X-ray chest 12/3/2019 at 6:20 PM



NUMBER OF VIEWS:





TECHNIQUE:





LIMITATIONS:

None.



FINDINGS:



The tip of the right-sided jugular venous line is in the superior

vena cava.



No evidence of pneumothorax or pleural effusion.



No evidence of pulmonary infiltrate.



The heart and mediastinum are unremarkable.



There is elevation of the right hemidiaphragm.



There is no significant change, as compared with the prior

x-ray(s). 



IMPRESSION:



The tip of the central venous line is in the superior vena cava.

 



copyright 2011 [employer]- All Rights Reserved

## 2019-12-04 NOTE — XCELERA REPORT
[address] [address]

                               Tel: [phone]

                               Fax: [phone]



                      Transthoracic Echocardiogram Report

_______________________________________________________________________________



Name: TIBURCIO LOGAN

MRN: [id number]                           Age: 33 yrs

Gender: Male                              : 1985

Patient Status: Inpatient                 Patient Location: ICU^606^A

Account #: [id number]

Study Date: 2019 10:45 AM

Accession #: [id number]

_______________________________________________________________________________



Height: 70 in        Weight: 171 lb        BSA: 2.0 m2

_______________________________________________________________________________

Procedure: A two-dimensional transthoracic echocardiogram with color flow and

Doppler was performed. Study Quality: Fair.

Reason For Study: cardiomegaly



History: CARDIOMEGALY.

Ordering Physician: BELLA LILLY



Performed By: Mitzy Beck

_______________________________________________________________________________



Interpretation Summary

The left ventricle is normal in size.

There is normal left ventricular wall thickness.

LV EF is 65%

Left ventricular systolic function is normal.

Doppler measurements suggest normal left ventricular diastolic function

The left ventricular wall motion is normal.

There is no thrombus.

There is no ventricular septal defect visualized.

The right ventricle is normal in size and function.

The right atrium is normal.

The left atrial size is normal.

The interatrial septum is intact with no evidence for an atrial septal defect.

There is no Doppler evidence for an interatrial shunt

There is no evidence of mitral valve prolapse.

There is no vegetation seen on the mitral valve.

There is no mitral valve stenosis.

There is no mitral regurgitation noted.

There is no aortic valvular vegetation.

There is no aortic valve stenosis

There is no LVOT obstruction.

No aortic regurgitation is present.

There is no tricuspid stenosis.

There is a trace amount of tricuspid regurgitation

Right ventricular systolic pressure is normal.

RVSP is 23 to 28 mmm of Hg , with RA mean of 5 to 10.

There is no pulmonic valvular stenosis.

There is a trace amount of pulmonic regurgitation

The aortic root is normal size.

The inferior vena cava appeared normal and decreased > 50% with respiration

(RAP 5-10 mmHg)

There is no pericardial effusion.



MMode/2D Measurements & Calculations

RVDd: 2.5 cm  LVIDd: 4.8 cm   FS: 37.0 %             Ao root diam: 3.3 cm



IVSd: 1.0 cm  LVIDs: 3.0 cm   EDV(Teich): 105.1 ml   Ao root area: 8.3 cm2

              LVPWd: 1.1 cm   ESV(Teich): 34.9 ml

                              EF(Teich): 66.8 %



Doppler Measurements & Calculations

MV E max jose guadalupe:       MV dec slope:        Ao V2 max:         LV V1 max P.3 cm/sec         540.1 cm/sec2        146.9 cm/sec       6.1 mmHg

MV A max jose guadalupe:       MV dec time: 0.13 secAo max PG:         LV V1 max:

64.1 cm/sec                              8.6 mmHg           123.7 cm/sec

MV E/A: 1.1

        _______________________________________________________________

PA V2 max:          PI end-d jose guadalupe:        TR max jose guadalupe:

102.7 cm/sec        102.8 cm/sec         211.8 cm/sec

PA max P.2 mmHg                      TR max P.9 mmHg





Left Ventricle

The left ventricle is normal in size. There is normal left ventricular wall

thickness. LV EF is 65%. Left ventricular systolic function is normal. Doppler

measurements suggest normal left ventricular diastolic function. The left

ventricular wall motion is normal. There is no thrombus. There is no

ventricular septal defect visualized.



Right Ventricle

The right ventricle is normal in size and function.



Atria

The right atrium is normal. The left atrial size is normal. The interatrial

septum is intact with no evidence for an atrial septal defect. There is no

Doppler evidence for an interatrial shunt.



Mitral Valve

There is no evidence of mitral valve prolapse. There is no vegetation seen on

the mitral valve. There is no mitral valve stenosis. There is no mitral

regurgitation noted.





Aortic Valve

There is no aortic valvular vegetation. There is no aortic valve stenosis.

There is no LVOT obstruction. No aortic regurgitation is present.



Tricuspid Valve

There is no tricuspid stenosis. There is a trace amount of tricuspid

regurgitation. Right ventricular systolic pressure is normal. RVSP is 23 to 28

mmm of Hg , with RA mean of 5 to 10.



Pulmonic Valve

There is no pulmonic valvular stenosis. There is a trace amount of pulmonic

regurgitation.



Great Vessels

The aortic root is normal size. The inferior vena cava appeared normal and

decreased > 50% with respiration (RAP 5-10 mmHg).





Effusions

There is no pericardial effusion.



_______________________________________________________________________________

_______________________________________________________________________________



Electronically signed by:      Nathaly Wood      on 2019 12:04 PM



CC: BELLA LILLY Lakshmi

## 2019-12-04 NOTE — PDOC CRITICAL CARE PROG REPORT
General


Date:: 12/04/19


ICU Day:: 2


Hospital Day:: 2


Events in the past 12 to 24 Hours:: 





Patient admitted from the emergency room with encephalopathy postictal state 

after prolonged status epilepticus.  He was also started on a Cardene drip 

because of significant hypertension.  He has had no further seizures.


Review of systems relevant to events:: 





Patient's blood pressure has improved and he is off Cardene.  This afternoon he 

had a slight increase without any evidence to support seizure or alcohol 

withdrawal.  He has been started on oral and PRN anti-hypertensives.  We have 

not started his ACE inhibitor because of his renal failure.


Reason for ICU Addmission:: Seizure with hypertension





- Medications:


Medications reviewed and adjusted accordingly: Yes


Vasopressors:: 





Cardene off 





Physical Exam


Vital Signs: 


                                        











Temp Pulse Resp BP Pulse Ox


 


 99.9 F   86   12   162/109 H  98 


 


 12/04/19 04:21  12/04/19 07:45  12/04/19 07:45  12/04/19 07:45  12/04/19 07:45








                                 Intake & Output











 12/03/19 12/04/19 12/05/19





 06:59 06:59 06:59


 


Intake Total  6008 


 


Output Total  670 


 


Balance  5338 


 


Weight  78 kg 








                                  Weight/Height





Weight                           78 kg


Height                           5 ft 10 in








General appearance: PRESENT: no acute distress, cooperative, disheveled, 

well-developed, well-nourished


Exam: 





Nontoxic older appearing 33-year-old black male appears ill but no acute 

distress.


Head exam: PRESENT: atraumatic, normocephalic


Eye exam: PRESENT: conjunctiva pink, EOMI, PERRLA.  ABSENT: conjunctival 

injection, scleral icterus


Ear exam: PRESENT: normal external ear exam


Mouth exam: PRESENT: moist, neck supple


Teeth exam: PRESENT: dental caries, poor dentation


Throat exam: ABSENT: post pharyngeal erythema


Neck exam: ABSENT: carotid bruit, JVD, lymphadenopathy, meningismus, thyromegaly


Respiratory exam: PRESENT: clear to auscultation dana, unlabored.  ABSENT: rales,

rhonchi, tachypnea, wheezes


Cardiovascular exam: PRESENT: RRR, +S1, +S2.  ABSENT: systolic murmur


Pulses: PRESENT: normal dorsalis pedis pul, +2 pedal pulses bilateral


Vascular exam: PRESENT: normal capillary refill


GI/Abdominal exam: PRESENT: normal bowel sounds, soft.  ABSENT: distended, 

guarding, mass, organolmegaly, rebound, tenderness


Rectal exam: PRESENT: deferred


Gentrourinary exam: ABSENT: ecchymosis, lesions, scrotal swelling, urethral 

discharge, indwelling catheter


Extremities exam: ABSENT: pedal edema, tenderness


Musculoskeletal exam: PRESENT: normal inspection, other - Has soft-tissue 

subcutaneous mass proximal dorsal thigh.  ABSENT: deformity, dislocation


Neurological exam: PRESENT: alert, awake, oriented to person, oriented to place,

oriented to time, CN II-XII grossly intact.  ABSENT: motor sensory deficit, 

aphasic


Psychiatric exam: PRESENT: appropriate affect


Focused psych exam: ABSENT: pressured speech, psychomotor agitation, 

restlessness


Skin exam: PRESENT: dry, intact, warm.  ABSENT: cyanosis, rash





Laboratory/Radiographs


Laboratory Results: 


                                        





                                 12/04/19 05:43 





                                 12/04/19 04:10 





                                        











  12/03/19 12/03/19 12/03/19





  08:53 08:53 09:30


 


WBC  Cancelled  


 


RBC  Cancelled  


 


Hgb  Cancelled  


 


Hct  Cancelled  


 


MCV  Cancelled  


 


MCH  Cancelled  


 


MCHC  Cancelled  


 


RDW  Cancelled  


 


Plt Count  Cancelled  


 


Seg Neutrophils %  Cancelled  


 


Carbonic Acid   


 


HCO3/H2CO3 Ratio   


 


ABG pH   


 


ABG pCO2   


 


ABG pO2   


 


ABG HCO3   


 


ABG O2 Saturation   


 


ABG Base Excess   


 


VBG pH    Cancelled


 


VBG pCO2    Cancelled


 


VBG HCO3    Cancelled


 


VBG Base Excess    Cancelled


 


FiO2   


 


Sodium   Cancelled 


 


Potassium   Cancelled 


 


Chloride   Cancelled 


 


Carbon Dioxide   Cancelled 


 


Anion Gap   Cancelled 


 


BUN   Cancelled 


 


Creatinine   Cancelled 


 


Est GFR ( Amer)   Cancelled 


 


Est GFR (Non-Af Amer)   Cancelled 


 


Glucose   Cancelled 


 


Lactic Acid   


 


Calcium   Cancelled 


 


Ionized Calcium Hakan   


 


Phosphorus   


 


Magnesium   


 


Total Bilirubin   Cancelled 


 


AST   Cancelled 


 


Alkaline Phosphatase   Cancelled 


 


Total Protein   Cancelled 


 


Albumin   Cancelled 


 


PTH Intact   


 


Urine Color   


 


Urine Appearance   


 


Urine pH   


 


Ur Specific Gravity   


 


Urine Protein   


 


Urine Glucose (UA)   


 


Urine Ketones   


 


Urine Blood   














  12/03/19 12/03/19 12/03/19





  11:09 11:09 13:40


 


WBC   23.2 H 


 


RBC   5.60 H 


 


Hgb   17.4 H 


 


Hct   53.2 H 


 


MCV   95  D 


 


MCH   31.0 


 


MCHC   32.6 


 


RDW   14.4 H 


 


Plt Count   293 


 


Seg Neutrophils %   Not Reportable 


 


Carbonic Acid   


 


HCO3/H2CO3 Ratio   


 


ABG pH   


 


ABG pCO2   


 


ABG pO2   


 


ABG HCO3   


 


ABG O2 Saturation   


 


ABG Base Excess   


 


VBG pH   


 


VBG pCO2   


 


VBG HCO3   


 


VBG Base Excess   


 


FiO2   


 


Sodium  145.3 H  


 


Potassium  4.3  


 


Chloride  110 H  


 


Carbon Dioxide  9 L*  


 


Anion Gap  26 H  


 


BUN  10  


 


Creatinine  2.16 H  


 


Est GFR ( Amer)  43 L  


 


Est GFR (Non-Af Amer)   


 


Glucose  161 H  


 


Lactic Acid   


 


Calcium  10.5 H  


 


Ionized Calcium Hakan   


 


Phosphorus   


 


Magnesium   


 


Total Bilirubin  0.5  


 


AST  49  


 


Alkaline Phosphatase  137 H  


 


Total Protein  9.1 H  


 


Albumin  5.0  


 


PTH Intact   


 


Urine Color    YELLOW


 


Urine Appearance    SLIGHTLY-CLOUDY


 


Urine pH    5.0


 


Ur Specific Gravity    1.010


 


Urine Protein    100 H


 


Urine Glucose (UA)    NEGATIVE


 


Urine Ketones    NEGATIVE


 


Urine Blood    MODERATE H














  12/03/19 12/03/19 12/03/19





  14:10 17:27 18:16


 


WBC   


 


RBC   


 


Hgb   


 


Hct   


 


MCV   


 


MCH   


 


MCHC   


 


RDW   


 


Plt Count   


 


Seg Neutrophils %   


 


Carbonic Acid  1.16  


 


HCO3/H2CO3 Ratio  17:1  


 


ABG pH  7.34 L  


 


ABG pCO2  38.4  


 


ABG pO2  77.1 L  


 


ABG HCO3  20.3  


 


ABG O2 Saturation  94.8  


 


ABG Base Excess  -4.9  


 


VBG pH   


 


VBG pCO2   


 


VBG HCO3   


 


VBG Base Excess   


 


FiO2  ROOM AIR  


 


Sodium   


 


Potassium   


 


Chloride   


 


Carbon Dioxide   


 


Anion Gap   


 


BUN   


 


Creatinine   


 


Est GFR ( Amer)   


 


Est GFR (Non-Af Amer)   


 


Glucose   


 


Lactic Acid   


 


Calcium   


 


Ionized Calcium Hakan    1.09 L


 


Phosphorus   


 


Magnesium   


 


Total Bilirubin   


 


AST   


 


Alkaline Phosphatase   


 


Total Protein   


 


Albumin   


 


PTH Intact   85.2 H 


 


Urine Color   


 


Urine Appearance   


 


Urine pH   


 


Ur Specific Gravity   


 


Urine Protein   


 


Urine Glucose (UA)   


 


Urine Ketones   


 


Urine Blood   














  12/04/19 12/04/19 12/04/19





  04:10 04:10 05:43


 


WBC  Cancelled   10.7 H


 


RBC  Cancelled   4.61


 


Hgb  Cancelled   14.3  D


 


Hct  Cancelled   41.8


 


MCV  Cancelled   91  D


 


MCH  Cancelled   31.1


 


MCHC  Cancelled   34.3


 


RDW  Cancelled   14.1 H


 


Plt Count  Cancelled   208


 


Seg Neutrophils %  Cancelled   69.9


 


Carbonic Acid   


 


HCO3/H2CO3 Ratio   


 


ABG pH   


 


ABG pCO2   


 


ABG pO2   


 


ABG HCO3   


 


ABG O2 Saturation   


 


ABG Base Excess   


 


VBG pH   


 


VBG pCO2   


 


VBG HCO3   


 


VBG Base Excess   


 


FiO2   


 


Sodium   138.4 


 


Potassium   3.2 L D 


 


Chloride   105 


 


Carbon Dioxide   26  D 


 


Anion Gap   7 


 


BUN   7 


 


Creatinine   1.17 


 


Est GFR ( Amer)   > 60 


 


Est GFR (Non-Af Amer)   


 


Glucose   126 H 


 


Lactic Acid   


 


Calcium   8.4 


 


Ionized Calcium Hakan   


 


Phosphorus   2.7 


 


Magnesium   2.4 H 


 


Total Bilirubin   0.5 


 


AST   33 


 


Alkaline Phosphatase   82 


 


Total Protein   6.7 


 


Albumin   3.5 


 


PTH Intact   


 


Urine Color   


 


Urine Appearance   


 


Urine pH   


 


Ur Specific Gravity   


 


Urine Protein   


 


Urine Glucose (UA)   


 


Urine Ketones   


 


Urine Blood   














  12/04/19





  07:10


 


WBC 


 


RBC 


 


Hgb 


 


Hct 


 


MCV 


 


MCH 


 


MCHC 


 


RDW 


 


Plt Count 


 


Seg Neutrophils % 


 


Carbonic Acid 


 


HCO3/H2CO3 Ratio 


 


ABG pH 


 


ABG pCO2 


 


ABG pO2 


 


ABG HCO3 


 


ABG O2 Saturation 


 


ABG Base Excess 


 


VBG pH 


 


VBG pCO2 


 


VBG HCO3 


 


VBG Base Excess 


 


FiO2 


 


Sodium 


 


Potassium 


 


Chloride 


 


Carbon Dioxide 


 


Anion Gap 


 


BUN 


 


Creatinine 


 


Est GFR ( Amer) 


 


Est GFR (Non-Af Amer) 


 


Glucose 


 


Lactic Acid  1.3


 


Calcium 


 


Ionized Calcium Hakan 


 


Phosphorus 


 


Magnesium 


 


Total Bilirubin 


 


AST 


 


Alkaline Phosphatase 


 


Total Protein 


 


Albumin 


 


PTH Intact 


 


Urine Color 


 


Urine Appearance 


 


Urine pH 


 


Ur Specific Gravity 


 


Urine Protein 


 


Urine Glucose (UA) 


 


Urine Ketones 


 


Urine Blood 








                                        











  12/03/19 12/03/19 12/03/19





  08:53 17:27 17:27


 


Creatine Kinase    622 H


 


Troponin I  0.022  0.519 














  12/03/19 12/04/19





  18:16 04:10


 


Creatine Kinase  


 


Troponin I  0.428  0.127











Impressions: 


                                        





Humerus X-Ray  12/03/19 10:39


IMPRESSION:  NEGATIVE STUDY OF THE RIGHT HUMERUS. NO RADIOGRAPHIC EVIDENCE OF 

ACUTE INJURY.


 








Head CT  12/03/19 16:12


IMPRESSION:  NORMAL BRAIN CT WITHOUT CONTRAST.


EVIDENCE OF ACUTE STROKE: NO.


 








Chest X-Ray  12/04/19 00:00


IMPRESSION:


 


The tip of the central venous line is in the superior vena cava.


 


 


copyright 2011 [employer]- All Rights Reserved


 











All labs, radiographs, diagnostic studies and EKGs were personally reviewed: Yes


In addition, reports of radiographic and diagnostic studies were read: Yes





Assessment and Plan





- Diagnosis


(1) Hypertensive urgency


Is this a current diagnosis for this admission?: Yes   


Plan: 


Wean cardene. Start BBlocker and Norvasc. Have held chlorthalidone and ACEI 

secondary to acute renal failure








(2) Status epilepticus, generalized convulsive


Is this a current diagnosis for this admission?: Yes   


Plan: 


Resolved. Suspect medicine non-compliance. 








(3) Lactic acidosis


Is this a current diagnosis for this admission?: Yes   


Plan: 


Resolved. Secondary to seizures








(4) Acute kidney failure


Qualifiers: 


   Acute renal failure type: with acute renal cortical necrosis   Qualified 

Code(s): N17.1 - Acute kidney failure with acute cortical necrosis   


Is this a current diagnosis for this admission?: Yes   


Plan: 


Improving. Reduce IVF, liquid encouraged in diet. Have held ACEI until improved








(5) Encephalopathy acute


Is this a current diagnosis for this admission?: Yes   


Plan: 


Resolved. Has history of TBI. At baseline neurologic status. Right arm has been 

intermittently weak and painful. XRay negative








(6) Delirium due to another medical condition, acute, hypoactive


Is this a current diagnosis for this admission?: Yes   


Plan: 


Resolved. Watch for ETOH withdrawl








(7) Alcohol abuse


Is this a current diagnosis for this admission?: Yes   





(8) Tobacco abuse


Is this a current diagnosis for this admission?: Yes   





(9) Rhabdomyolysis


Qualifiers: 


   Rhabdomyolysis type: traumatic   Encounter type: initial encounter   

Qualified Code(s): T79.6XXA - Traumatic ischemia of muscle, initial encounter   


Is this a current diagnosis for this admission?: Yes   


Plan: 


Improved. IV Fluids reduced





Plan Summary: 


12.4.19:


Patient's condition, overall has improved.  We have had some difficulty in 

controlling his blood pressure however this appears to be an ongoing chronic 

issue secondary to medical noncompliance.


I have started him on Lopressor and Norvasc today.  We have held using 

chlorthalidone or ACE inhibitor due to his renal failure.


PRN hydralazine is also been ordered.


We will need to monitor in the ICU secondary to the unstable nature of his blood

pressure.  We will also need to monitor for acute alcohol withdrawal given the 

amount of alcohol he ingest daily.


Of course he is at risk for further seizures with alcohol withdrawal and will 

monitor his status in the ICU








12.3.19





Will admit the patient to the ICU.  Have placed him on a D5 modified bicarbonate

drip as a resuscitation fluid to prevent renal failure.


Have increased his Keppra to 1500 mg twice daily.  Unfortunately I am unable to 

get a baseline Keppra because the patient was given a bolus loading dose in the 

emergency room.


Of importance is his encephalopathy and delirium.  He was given Ativan and I am 

concerned about aspiration and neurological status.


He also has hypertension that is difficult to control.  He is not taking 

anything p.o. and so we have started a Cardene drip.


Important follow-up items will be to check creatinine kinase to evaluate the 

degree of rhabdomyolysis.


We will follow his pH and bicarbonate status.


In that the patient had a full neurological recovery and has had multiple CAT 

scans (greater than 9), will wait on ordering any imaging studies unless the 

patient has a neurological deficits.


This does not appear to be alcohol withdrawal related but may be related to 

alcohol excess.  Will check alcohol level as well.


Will need to have neurological follow-up as well.











Critical Time


Critical Time (minutes): 40


Level of Care: ICU


Anticipated discharge: Home


Within: within 48 hours


-: 


1.  The care of a critical patient is a dynamic process.  This note is a 

representative synopsis but static in nature.  The timeframe for treatments 

given in order is not necessary the actual time these treatments may have been 

done.





2.  This patient requires critical care secondary to ongoing requirements for 

therapy not offered or safe outside the critical care environment.  Transfer to 

a lower level of care with altered life or limb morbidity and mortality.





3.  Multidisciplinary rounds completed.





4.  ABCDE bundle addressed.


5. MPOA: Wife, Lucia

## 2019-12-05 NOTE — EKG REPORT
SEVERITY:- ABNORMAL ECG -

SINUS RHYTHM

PROBABLE LEFT ATRIAL ABNORMALITY

PROBABLE LEFT VENTRICULAR HYPERTROPHY

:

Confirmed by: Nathaly Wood MD 05-Dec-2019 19:15:13

## 2019-12-05 NOTE — PDOC CRITICAL CARE PROG REPORT
General


Date:: 12/05/19


ICU Day:: 3


Hospital Day:: 3


Resuscitation Status: Full Code


Medical Power of : Wife: Lucia


Events in the past 12 to 24 Hours:: 


12.5.19:


Patient has been seizure-free since admission.  He was restarted on his oral 

Keppra.  It is difficult to ascertain whether he has been taking his medications

accurately.





12/4/19:


Patient admitted from the emergency room with encephalopathy postictal state 

after prolonged status epilepticus.  He was also started on a Cardene drip 

because of significant hypertension.  He has had no further seizures.


Review of systems relevant to events:: 


Patient's blood pressure has improved and he is off Cardene.  This afternoon he 

had a slight increase without any evidence to support seizure or alcohol with

drawal.  He has been started on oral and PRN anti-hypertensives.  We have not 

started his ACE inhibitor because of his renal failure.


Review of systems relevant to events:: 





Patient has had no difficulty breathing.  No chest pain.  No headache or visual 

changes.  Nursing has not noted any mental status changes or seizures.  No 

evidence of withdrawal phenomena


Reason for ICU Addmission:: Seizure with hypertension





- Medications:


Vasopressors:: 





Cardene off 





Physical Exam


Vital Signs: 


                                        











Temp Pulse Resp BP Pulse Ox


 


 99.6 F   64   11 L  130/91 H  100 


 


 12/05/19 12:00  12/05/19 12:00  12/05/19 14:00  12/05/19 13:10  12/05/19 14:00








                                 Intake & Output











 12/04/19 12/05/19 12/06/19





 06:59 06:59 06:59


 


Intake Total 6008 2132.8 


 


Output Total 670 2275 250


 


Balance 5338 -142.2 -250


 


Weight 78 kg 79.4 kg 








                                  Weight/Height





Weight                           79.4 kg


Height                           5 ft 10 in








General appearance: PRESENT: no acute distress, cooperative, well-developed, 

well-nourished


Exam: 





Pleasant nontoxic smiling unkempt 33-year-old black male no active distress 

awake alert oriented x3


Head exam: PRESENT: atraumatic, normocephalic


Eye exam: PRESENT: conjunctival injection, conjunctiva pink, EOMI, PERRLA.  

ABSENT: nystagmus, scleral icterus


Ear exam: PRESENT: normal external ear exam


Mouth exam: PRESENT: moist, neck supple, tongue midline


Teeth exam: PRESENT: dental caries, poor dentation, other - Teeth missing 

(incisor) present on admission


Neck exam: ABSENT: carotid bruit, JVD, lymphadenopathy, thyromegaly


Respiratory exam: PRESENT: clear to auscultation dana.  ABSENT: rales, rhonchi, 

wheezes


Cardiovascular exam: PRESENT: RRR.  ABSENT: diastolic murmur, rubs, systolic 

murmur


Pulses: PRESENT: +1 pedal pulses bilateral


GI/Abdominal exam: PRESENT: normal bowel sounds, soft.  ABSENT: distended, 

guarding, mass, organolmegaly, rebound, tenderness


Rectal exam: PRESENT: deferred


Gentrourinary exam: ABSENT: ecchymosis, lesions, scrotal swelling


Extremities exam: PRESENT: full ROM.  ABSENT: pedal edema, tenderness


Musculoskeletal exam: PRESENT: ambulatory, normal inspection, other - Soft-

tissue mass left posterior thigh.  ABSENT: deformity, dislocation


Neurological exam: PRESENT: alert, awake, oriented to person, oriented to place,

oriented to time, oriented to situation, CN II-XII grossly intact.  ABSENT: 

motor sensory deficit


Psychiatric exam: PRESENT: appropriate affect, normal mood


Focused psych exam: ABSENT: pressured speech, psychomotor agitation, 

restlessness


Skin exam: PRESENT: dry, intact, normal color, warm.  ABSENT: cyanosis, mottled,

pallor, petechiae, rash


Tubes/Lines: ABSENT: Endotracheal Tube, Chest Tube, Central Line, Arterial 

Catheter, Dialysis catheter, Peg Tube, Nasogastic Tube, Other





Laboratory/Radiographs


Laboratory Results: 


                                        





                                 12/05/19 03:50 





                                 12/05/19 03:50 





                                        











  12/04/19 12/04/19 12/04/19





  18:32 18:32 19:50


 


WBC   


 


RBC   


 


Hgb   


 


Hct   


 


MCV   


 


MCH   


 


MCHC   


 


RDW   


 


Plt Count   


 


Seg Neutrophils %   


 


Sodium   


 


Potassium   


 


Chloride   


 


Carbon Dioxide   


 


Anion Gap   


 


BUN   


 


Creatinine   


 


Est GFR (African Amer)   


 


Glucose   


 


Lactic Acid   


 


Calcium   


 


Phosphorus   


 


Magnesium   


 


Total Bilirubin   


 


AST   


 


Alkaline Phosphatase   


 


Ammonia  < 8.7 L  


 


Total Protein   


 


Albumin   


 


TSH   0.55 


 


Urine Color    YELLOW


 


Urine Appearance    CLEAR


 


Urine pH    7.0


 


Ur Specific Gravity    1.009


 


Urine Protein    NEGATIVE


 


Urine Glucose (UA)    NEGATIVE


 


Urine Ketones    NEGATIVE


 


Urine Blood    NEGATIVE


 


Urine Nitrite    NEGATIVE


 


Ur Leukocyte Esterase    NEGATIVE


 


Urine WBC (Auto)    0


 


Urine RBC (Auto)    0














  12/04/19 12/05/19 12/05/19





  22:00 03:50 03:50


 


WBC    6.4


 


RBC    4.40


 


Hgb    13.6


 


Hct    40.0


 


MCV    91


 


MCH    31.0


 


MCHC    34.1


 


RDW    14.1 H


 


Plt Count    187


 


Seg Neutrophils %    60.0


 


Sodium  139.4  


 


Potassium  3.5 L  


 


Chloride  106  


 


Carbon Dioxide  26  


 


Anion Gap  7  


 


BUN  6 L  


 


Creatinine  0.92  


 


Est GFR ( Amer)  > 60  


 


Glucose  102  


 


Lactic Acid   


 


Calcium  9.0  


 


Phosphorus   


 


Magnesium   


 


Total Bilirubin  0.7  


 


AST  31  


 


Alkaline Phosphatase  77  


 


Ammonia   8.9 L 


 


Total Protein  6.9  


 


Albumin  3.6  


 


TSH   


 


Urine Color   


 


Urine Appearance   


 


Urine pH   


 


Ur Specific Gravity   


 


Urine Protein   


 


Urine Glucose (UA)   


 


Urine Ketones   


 


Urine Blood   


 


Urine Nitrite   


 


Ur Leukocyte Esterase   


 


Urine WBC (Auto)   


 


Urine RBC (Auto)   














  12/05/19 12/05/19





  03:50 03:50


 


WBC  


 


RBC  


 


Hgb  


 


Hct  


 


MCV  


 


MCH  


 


MCHC  


 


RDW  


 


Plt Count  


 


Seg Neutrophils %  


 


Sodium  141.0 


 


Potassium  3.6 


 


Chloride  107 


 


Carbon Dioxide  28 


 


Anion Gap  6 


 


BUN  5 L 


 


Creatinine  0.97 


 


Est GFR (African Amer)  > 60 


 


Glucose  94 


 


Lactic Acid   0.7


 


Calcium  9.1 


 


Phosphorus  2.9 


 


Magnesium  2.2 


 


Total Bilirubin  


 


AST  


 


Alkaline Phosphatase  


 


Ammonia  


 


Total Protein  


 


Albumin  


 


TSH  


 


Urine Color  


 


Urine Appearance  


 


Urine pH  


 


Ur Specific Gravity  


 


Urine Protein  


 


Urine Glucose (UA)  


 


Urine Ketones  


 


Urine Blood  


 


Urine Nitrite  


 


Ur Leukocyte Esterase  


 


Urine WBC (Auto)  


 


Urine RBC (Auto)  








                                        











  12/03/19 12/03/19 12/03/19





  08:53 17:27 17:27


 


Creatine Kinase    622 H


 


Troponin I  0.022  0.519 














  12/03/19 12/04/19 12/04/19





  18:16 04:10 21:26


 


Creatine Kinase    323 H


 


Troponin I  0.428  0.127 











Impressions: 


                                        





Humerus X-Ray  12/03/19 10:39


IMPRESSION:  NEGATIVE STUDY OF THE RIGHT HUMERUS. NO RADIOGRAPHIC EVIDENCE OF 

ACUTE INJURY.


 








Head CT  12/03/19 16:12


IMPRESSION:  NORMAL BRAIN CT WITHOUT CONTRAST.


EVIDENCE OF ACUTE STROKE: NO.


 








Chest X-Ray  12/04/19 00:00


IMPRESSION:


 


The tip of the central venous line is in the superior vena cava.


 


 


copyright 2011 [employer]- All Rights Reserved


 











All labs, radiographs, diagnostic studies and EKGs were personally reviewed: Yes


In addition, reports of radiographic and diagnostic studies were read: Yes





Assessment and Plan





- Diagnosis


(1) Hypertensive urgency


Is this a current diagnosis for this admission?: Yes   


Plan: 


Off cardene. D/C B Blocker and Norvasc. Start chlorthalidone and ACEI 








(2) Status epilepticus, generalized convulsive


Is this a current diagnosis for this admission?: Yes   


Plan: 


Resolved. Suspect medicine non-compliance. Placed back on 1000 mg BiD








(3) Lactic acidosis


Is this a current diagnosis for this admission?: Yes   


Plan: 


Resolved. Secondary to seizures








(4) Acute kidney failure


Qualifiers: 


   Acute renal failure type: with acute renal cortical necrosis   Qualified 

Code(s): N17.1 - Acute kidney failure with acute cortical necrosis   


Is this a current diagnosis for this admission?: Yes   


Plan: 


Resolved. Stop IVF,  Watch GFR on  ACEI and chlorthalidone








(5) Encephalopathy acute


Is this a current diagnosis for this admission?: Yes   


Plan: 


Resolved. Has history of TBI. Neurologic status at baseline. Right arm has been 

intermittently weak. Patient states weakness has improved. XRay of humerus is 

negative. Will xray right shoulder








(6) Delirium due to another medical condition, acute, hypoactive


Is this a current diagnosis for this admission?: Yes   


Plan: 


Resolved. Watch for ETOH withdrawl








(7) Alcohol abuse


Is this a current diagnosis for this admission?: Yes   


Plan: 


No active withdraw. 








(8) Tobacco abuse


Is this a current diagnosis for this admission?: Yes   


Plan: 


Nicotine patch , smoking cessation counseling








(9) Rhabdomyolysis


Qualifiers: 


   Rhabdomyolysis type: traumatic   Encounter type: initial encounter   

Qualified Code(s): T79.6XXA - Traumatic ischemia of muscle, initial encounter   


Is this a current diagnosis for this admission?: Yes   


Plan: 


Resolved








(10) Mass of soft tissue of left lower extremity


Is this a current diagnosis for this admission?: Yes   


Plan: 


Suspect lipoma. Will need outpatient surgical referral. Present prior to 

admission





Plan Summary: 





Patient's blood pressure has improved.  Started him on his home regimen and ad

ded chlorthalidone.


We try to ascertain whether his seizures and blood pressure issues are related t

o medical noncompliance.  He is difficult to obtain a history from.


He has persistent weakness of the right shoulder for which he states is normal 

for him.  Is to be restriction at the shoulder joint and will order an x-ray to 

assure no anatomical dysfunction.


Patient is suitable for downgrade from ICU status.  Would recommend 24 to 36 

hours of inpatient evaluation to assure that his blood pressure is stable.


Need to be vigilant to watch for alcohol withdrawal.  He shows no asterixis or 

tremor.


We will increase diet, DC IV fluids, and follow hemodynamics.


Will need outpatient surgical follow-up for soft tissue mass of his left leg 

which appears to be a lipoma.





Critical Time


Critical Time (minutes): 0 - 96701


Level of Care: MEDICAL


Anticipated discharge: Home


Within: within 36 hours


-: 


1.  The care of a critical patient is a dynamic process.  This note is a 

representative synopsis but static in nature.  The timeframe for treatments 

given in order is not necessary the actual time these treatments may have been 

done.





2.  This patient requires critical care secondary to ongoing requirements for 

therapy not offered or safe outside the critical care environment.  Transfer to 

a lower level of care with altered life or limb morbidity and mortality.





3.  Multidisciplinary rounds completed.





4.  ABCDE bundle addressed.

## 2019-12-05 NOTE — RADIOLOGY REPORT (SQ)
EXAM DESCRIPTION:  SHOULDER LEFT 2 OR MORE VIEWS



COMPLETED DATE/TIME:  12/5/2019 3:19 pm



REASON FOR STUDY:  shoulder pain with restriction post seizure



COMPARISON:  None.



NUMBER OF VIEWS:  Three views.



TECHNIQUE:  Internal rotation, external rotation, and Y view images acquired of the left shoulder.



LIMITATIONS:  None.



FINDINGS:  MINERALIZATION: Normal.

BONES: No acute fracture.  No worrisome bone lesions.

JOINTS: No dislocation.

VISUALIZED LUNGS AND RIBS: No pneumothorax.  No rib fracture.

SOFT TISSUES: No radiopaque foreign body.

OTHER: No other significant finding.



IMPRESSION:  NEGATIVE STUDY OF THE LEFT SHOULDER. NO RADIOGRAPHIC EVIDENCE OF ACUTE INJURY.



TECHNICAL DOCUMENTATION:  JOB ID:  [id number]

 2011 [email or web address]- All Rights Reserved



Reading location - IP/workstation name: [email or web address]

## 2019-12-06 NOTE — PDOC CRITICAL CARE PROG REPORT
General


Date:: 12/06/19


ICU Day:: 3


Hospital Day:: 3


Resuscitation Status: Full Code


Medical Power of : Wife: Lucia


Events in the past 12 to 24 Hours:: 





The patient was started on oral antihypertensives and was weaned off Cardene.  

He has had no seizures, no headaches,.  He is anxious and requesting to go home.


Review of systems relevant to events:: 





No documented seizures.  No headache.  Right arm movement is significantly 

improved.  No evidence clinically for withdrawal symptoms.  Patient has no 

subjective withdrawal symptoms.


Reason for ICU Addmission:: Seizure with hypertension





- Medications:


Medications reviewed and adjusted accordingly: Yes


Vasopressors:: 





None 





Physical Exam


Vital Signs: 


                                        











Temp Pulse Resp BP Pulse Ox


 


 98.9 F   90   17   130/91 H  99 


 


 12/05/19 21:40  12/05/19 14:00  12/05/19 14:00  12/05/19 14:00  12/05/19 14:00








                                 Intake & Output











 12/05/19 12/06/19 12/07/19





 06:59 06:59 06:59


 


Intake Total 2132.8 0 


 


Output Total 2275 1025 


 


Balance -142.2 -1025 


 


Weight 79.4 kg 79.1 kg 








                                  Weight/Height





Weight                           79.1 kg


Height                           5 ft 10 in








General appearance: PRESENT: no acute distress, cooperative, disheveled, well-

developed, well-nourished


Exam: 





Pleasant nontoxic older appearing 33-year-old black male no acute distress awake

alert oriented x3. All vitals evaluated and reviewed in the past 24 hours.


Head exam: PRESENT: atraumatic, normocephalic


Eye exam: PRESENT: conjunctival injection, conjunctiva pink, EOMI, PERRLA.  

ABSENT: nystagmus, scleral icterus


Mouth exam: PRESENT: moist, neck supple, other - No hematoma, no bleeding at 

previous central line site


Teeth exam: PRESENT: poor dentation, other - loss of teeth present prior to 

admission


Neck exam: ABSENT: carotid bruit, JVD, lymphadenopathy, meningismus, thyromegaly


Respiratory exam: PRESENT: clear to auscultation dana, unlabored.  ABSENT: rales,

rhonchi, tachypnea, wheezes


Cardiovascular exam: PRESENT: RRR, +S1, +S2.  ABSENT: diastolic murmur, rubs, 

systolic murmur


Pulses: PRESENT: +1 pedal pulses bilateral


Vascular exam: PRESENT: normal capillary refill.  ABSENT: pallor


GI/Abdominal exam: PRESENT: normal bowel sounds, soft.  ABSENT: ascites, 

distended, guarding, mass, organolmegaly, rebound, tenderness


Rectal exam: PRESENT: deferred


Gentrourinary exam: ABSENT: indwelling catheter


Extremities exam: ABSENT: pedal edema, tenderness


Musculoskeletal exam: PRESENT: ambulatory.  ABSENT: deformity, dislocation


Neurological exam: PRESENT: alert, awake, oriented to person, oriented to place,

oriented to time, oriented to situation, CN II-XII grossly intact.  ABSENT: 

ataxia, motor sensory deficit


Psychiatric exam: PRESENT: appropriate affect, normal mood.  ABSENT: agitated, 

anxious


Focused psych exam: ABSENT: pressured speech, psychomotor agitation, 

restlessness


Skin exam: PRESENT: dry, intact, normal color, warm.  ABSENT: cyanosis, mottled,

petechiae, rash


Tubes/Lines: ABSENT: Endotracheal Tube, Chest Tube, Central Line, Arterial 

Catheter, Dialysis catheter, Peg Tube, Nasogastic Tube, Other





Laboratory/Radiographs


Laboratory Results: 


                                        





                                 12/05/19 03:50 





                                 12/05/19 03:50 





                                        











  12/03/19 12/03/19 12/03/19





  08:53 17:27 17:27


 


Creatine Kinase    622 H


 


Troponin I  0.022  0.519 














  12/03/19 12/04/19 12/04/19





  18:16 04:10 21:26


 


Creatine Kinase    323 H


 


Troponin I  0.428  0.127 











Impressions: 


                                        





Humerus X-Ray  12/03/19 10:39


IMPRESSION:  NEGATIVE STUDY OF THE RIGHT HUMERUS. NO RADIOGRAPHIC EVIDENCE OF 

ACUTE INJURY.


 








Head CT  12/03/19 16:12


IMPRESSION:  NORMAL BRAIN CT WITHOUT CONTRAST.


EVIDENCE OF ACUTE STROKE: NO.


 








Chest X-Ray  12/04/19 00:00


IMPRESSION:


 


The tip of the central venous line is in the superior vena cava.


 


 


copyright 2011 [employer]- All Rights Reserved


 








Shoulder X-Ray  12/05/19 00:00


IMPRESSION:  NEGATIVE STUDY OF THE LEFT SHOULDER. NO RADIOGRAPHIC EVIDENCE OF 

ACUTE INJURY.


 











All labs, radiographs, diagnostic studies and EKGs were personally reviewed: Yes


In addition, reports of radiographic and diagnostic studies were read: Yes





Assessment and Plan





- Diagnosis


(1) Hypertensive urgency


Is this a current diagnosis for this admission?: Yes   


Plan: 


Resolved. Improved on chlorthalidone and ACEI 








(2) Status epilepticus, generalized convulsive


Is this a current diagnosis for this admission?: Yes   


Plan: 


Resolved. Suspect ssecondary to medicine non-compliance. Placed back on Keppra 

1000 mg BiD








(3) Lactic acidosis


Is this a current diagnosis for this admission?: Yes   


Plan: 


Resolved. Secondary to seizures








(4) Acute kidney failure


Qualifiers: 


   Acute renal failure type: with acute renal cortical necrosis   Qualified Co

de(s): N17.1 - Acute kidney failure with acute cortical necrosis   


Is this a current diagnosis for this admission?: Yes   


Plan: 


Resolved. Stop IVF,  Watch GFR on  ACEI and chlorthalidone








(5) Encephalopathy acute


Is this a current diagnosis for this admission?: Yes   


Plan: 


Resolved. Has history of TBI. Neurologic status at baseline. Right arm has been 

intermittently weak but improved. Patient states weakness has improved. XRay of 

humerus is negative. Xray right shoulder is negative








(6) Delirium due to another medical condition, acute, hypoactive


Is this a current diagnosis for this admission?: Yes   


Plan: 


Resolved. Watch for ETOH withdrawl








(7) Alcohol abuse


Is this a current diagnosis for this admission?: Yes   


Plan: 


No active withdraw. 








(8) Tobacco abuse


Is this a current diagnosis for this admission?: Yes   


Plan: 


Nicotine patch , smoking cessation counseling








(9) Rhabdomyolysis


Qualifiers: 


   Rhabdomyolysis type: traumatic   Encounter type: initial encounter   

Qualified Code(s): T79.6XXA - Traumatic ischemia of muscle, initial encounter   


Is this a current diagnosis for this admission?: Yes   


Plan: 


Resolved








(10) Mass of soft tissue of left lower extremity


Is this a current diagnosis for this admission?: Yes   


Plan: 


Suspect lipoma. Will need outpatient surgical referral. Present prior to 

admission





Plan Summary: 





12.6.19:


Patient's blood pressure has stabilized and he is currently on oral therapy.


He has had no further seizures.


Our preference would be that the patient be maintained in the hospital for 24 

hours with oral therapy however he is adamant about going home.


We feel that this would be safe however we are concerned about medical 

noncompliance and he has a high risk of returning given his multiple ED 

admissions for the same reasons.


I have made referrals to neurology and for PCP follow-up


We will place him on chlorthalidone and on his ACE inhibitor and have encouraged

him to maintain these medications.  We have warned him about the risk of stroke.

 We have counseled him about stopping smoking and alcohol.  Have made referrals 

for both alcohol and tobacco cessation programs.


He is at risk for stroke and complications from his hypertension, epilepsy, 

alcohol and tobacco related disorders.





12.5.19:Patient's blood pressure has improved.  Started him on his home regimen 

and added chlorthalidone.


We try to ascertain whether his seizures and blood pressure issues are related 

to medical noncompliance.  He is difficult to obtain a history from.


He has persistent weakness of the right shoulder for which he states is normal 

for him.  Is to be restriction at the shoulder joint and will order an x-ray to 

assure no anatomical dysfunction.


Patient is suitable for downgrade from ICU status.  Would recommend 24 to 36 

hours of inpatient evaluation to assure that his blood pressure is stable.


Need to be vigilant to watch for alcohol withdrawal.  He shows no asterixis or 

tremor.


We will increase diet, DC IV fluids, and follow hemodynamics.


Will need outpatient surgical follow-up for soft tissue mass of his left leg 

which appears to be a lipoma.





Critical Time


Critical Time (minutes): 0 - 77139


Level of Care: MEDICAL


Anticipated discharge: Home


-: 


1.  The care of a critical patient is a dynamic process.  This note is a 

representative synopsis but static in nature.  The timeframe for treatments 

given in order is not necessary the actual time these treatments may have been 

done.





2.  This patient requires critical care secondary to ongoing requirements for 

therapy not offered or safe outside the critical care environment.  Transfer to 

a lower level of care with altered life or limb morbidity and mortality.





3.  Multidisciplinary rounds completed.





4.  ABCDE bundle addressed.

## 2020-01-01 NOTE — RADIOLOGY REPORT (SQ)
CT HEAD WITHOUT IV CONTRAST



EXAM DATE: 1/1/2020 9:31 PM CST



HISTORY: AMS, alcoholic, seizure.



COMPARISON: 12/3/2019



TECHNIQUE: 



CT scan of the brain without  IV contrast. This exam was

performed according to our departmental dose-optimization

program, which includes automated exposure control, adjustment of

the mA and/or kV according to patient size and/or use of

iterative reconstruction technique.



FINDINGS:



The ventricles, cisterns, and sulci are age-appropriate. No

evidence of acute infarction, intracranial hemorrhage,

extra-axial fluid collection, or midline shift. No air-fluid

levels are seen in the paranasal sinuses to suggest acute

sinusitis. No depressed skull fracture. There is a moderate-sized

left frontal scalp hematoma.



IMPRESSION: 



No acute intracranial hemorrhage.

Left frontal scalp hematoma.

## 2020-01-02 NOTE — ER DOCUMENT REPORT
ED General





- General


Chief Complaint: Headache


Stated Complaint: HEADACHE


Time Seen by Provider: 01/01/20 20:56


TRAVEL OUTSIDE OF THE [place] IN LAST 30 DAYS: No





- HPI


Notes: 





Patient is a 34-year-old male with a known history of seizure disorder who is 

brought to the emergency department for evaluation.  Evidently patient's wife 

called EMS, states he had a seizure, also stated that he had been "drinking for 

7 days straight."  The patient denies this.  He states he has a mild headache, 

but notes this is because his blood pressure is high.  He is supposed to be 

taking lisinopril, has not picked up his prescription.  He states he can get 

that tomorrow.  He denies drinking alcohol.  States he has been taking his 

Keppra.  He states that he is not sure why he is here.  He does have a mild 

headache, will not give me a numerical rating, but states that is not out of the

ordinary for him.





- Related Data


Allergies/Adverse Reactions: 


                                        





hydrocodone [From Vicodin] Allergy (Verified 03/21/19 13:35)


   








Home Medications: keppra, lisinopril, chlorthalidone





Past Medical History





- General


Information source: Patient, [facility] Records





- Social History


Smoking Status: Current Every Day Smoker


Frequency of alcohol use: Heavy


Family History: None, Reviewed & Not Pertinent


Patient has suicidal ideation: No


Patient has homicidal ideation: No





- Past Medical History


Cardiac Medical History: Reports: Hx Hypertension


Neurological Medical History: Reports: Hx Seizures


Renal/ Medical History: Denies: Hx Peritoneal Dialysis


Psychiatric Medical History: 


   Denies: Hx Depression


Traumatic Medical History: Reports: Hx Traumatic Brain Injury





- Immunizations


Immunizations up to date: No


Hx Diphtheria, Pertussis, Tetanus Vaccination: Yes





Review of Systems





- Review of Systems


Constitutional: No symptoms reported


EENT: No symptoms reported


Cardiovascular: No symptoms reported


Respiratory: No symptoms reported


Gastrointestinal: No symptoms reported


Genitourinary: No symptoms reported


Musculoskeletal: No symptoms reported


Skin: No symptoms reported


Neurological/Psychological: No symptoms reported





Physical Exam





- Vital signs


Vitals: 





                                        











Temp Pulse Resp BP Pulse Ox


 


 98.8 F   98   15   174/120 H  97 


 


 01/01/20 20:27  01/01/20 20:27  01/01/20 20:27  01/01/20 20:27  01/01/20 20:27














- Notes


Notes: 





Vital signs reviewed, please refer to chart. Head is normocephalic.  He has some

mild tenderness to palpation over the left frontal region.  No orbital step-off,

extraocular movements are intact.  Pupils equal round, reactive to light.  Oral 

mucosa is moist.  He has extensive deformity to the tongue consistent with 

history of bite injury, but I do not appreciate any fresh wounds or bleeding.  

Neck is supple without meningismus.  Heart is regular rate and rhythm.  Lungs 

are clear to auscultation bilaterally.  Abdomen is soft, nontender, normoactive 

bowel sounds throughout.  Extremities without cyanosis, clubbing. Posterior 

calves are nontender.  Peripheral pulses are equal.  Skin is warm and dry.  

Patient is drowsy, GCS of 14, but awakens to verbal stimuli.  Cranial nerves II 

to XII are grossly intact without focal neurological deficits.  Strength is +5-5

bilateral upper and lower extremities.  Reflexes are symmetrical, sensation is 

intact.  Intact finger-nose-finger, rapid alternating movements, heel-to-shin.





Course





- Re-evaluation


Re-evalutation: 





01/02/20 01:53


Patient presents to the emergency department for evaluation.  He is a known 

history of seizure disorder.  The patient himself denies much in the way of 

complaints.  His vital signs here were unremarkable and remained that way.  He 

was given his dose of lisinopril.  His Keppra level is pending at this time.  

His blood alcohol level was negative.  He remained stable throughout the course 

of his stay.  He is strongly encouraged to  his prescription for 

lisinopril.  His CT scan of his head did reveal a left frontal hematoma but no 

other acute intracranial injury.  He had been drowsy, so I find it likely that 

he did have a seizure, but he did not have any seizure activity here.  He is to 

follow-up closely with primary care, continue his home medications as 

prescribed, and return to the ED with worsening or new concerning symptoms of 

any sort.


01/02/20 01:55








- Vital Signs


Vital signs: 





                                        











Temp Pulse Resp BP Pulse Ox


 


 98.8 F   98   15   117/81   97 


 


 01/01/20 20:27  01/01/20 20:27  01/01/20 20:27  01/02/20 01:01 01/01/20 20:27














- Laboratory


Result Diagrams: 


                                 01/01/20 21:40





                                 01/01/20 21:40


Laboratory results interpreted by me: 





                                        











  01/01/20 01/02/20





  21:40 00:55


 


Magnesium  2.7 H 


 


Alkaline Phosphatase  147 H 


 


Total Protein  8.4 H 


 


Urine Protein   30 H


 


Urine Ketones   20 H














Discharge





- Discharge


Clinical Impression: 


 Seizure, Hypertension





Condition: Stable


Disposition: HOME, SELF-CARE


Instructions:  Headache (OM), Seizure, Known Epileptic (OM), Head Injury 

Precautions (OM)


Additional Instructions: 


Rest.  Take your home medications as prescribed, being sure to fill your 

lisinopril prescription tomorrow.  Follow-up with primary care in 1 to 2 weeks. 

Return to the emergency department with worsening or new concerning symptoms of 

any sort.

## 2020-01-23 NOTE — NEURO WORKBENCH EEG REPORT
EEG Report



Patient: Isabel Jha

ID: [id number]

Referring Doctor: Cedrick Draper

DOS: 01/23/2020



Medications: Porcine, diprivan RTU



History

This is a 34 year old man with a history of traumatic brain injury, epilepsy, 
hypertension who is intubated. This EEG was requested for seizures.



EEG Interpretation



This EEG was recorded in the comatose state with the patient intubated. The 
background was fairly monotonous and disorganized with higher amplitude activity
anteriorly in the mostly alpha and beta range with some theta. There was also 
lower amplitude activity posteriorly.  These appeared somewhat consistent with a
spindle coma pattern. There was no reactivity to passive eye opening/closing by 
the technologist. There was no noted posterior dominant rhythm. Photic 
stimulation resulted in no significant changes. There were occasional bilateral 
independent, R>L, temporal and frontal-central sharp waves. There were several 
brief runs (~1-2 seconds) of bifrontal ~6-8Hz sharply contoured waveforms. There
were no clinical correlations. These are most consistent with BIRDS (brief 
potentially ictal rhythmic discharges). Toward the end of the EEG there was fast
activity that appeared myogenic in nature starting in the left temporal region 
and evolving to bitemporal and then diffusely with a brief, acute pause of ~1-2 
seconds. Duration was over 3 minutes until the EEG technologist halted the EEG 
recording. There was no clinical correlation nor movement noted on the video. 
The EKG showed a regular rhythm.



EEG Classification



   BIRDs

   Sharp waves, occasional, bilateral independent, temporal, frontal-central, 
mostly right temporal

   Spindle coma

   Disorganized 



EEG Impression



This EEG is severely abnormal. There were occasional multifocal sharp waves 
(most right temporal) suggesting possible epileptogenic foci. BIRDs were present
which suggest potential ictal activity and are associated with increased risk of
seizures. The left temporal activity that spread diffusely was most consistent 
with muscle artifact and not a definitive seizure. But treatment with 
antiepileptic medication is recommended given the increased risk in this 
patient. The spindle coma may be seen with varying etiologies. Depending on 
etiology, prognosis varies but can be favorable. Followup EEG may be considered 
based on clinical outcome following treatment.



INTERPRETING NEUROLOGIST:  Alexa Orantes MD, FRCPC

Board Certified in Neurology, with special qualification in Child Neurology, and
in Clinical Neurophysiology

[facility]

## 2020-01-23 NOTE — EKG REPORT
SEVERITY:- ABNORMAL ECG -

SINUS TACHYCARDIA

LEFT ATRIAL ABNORMALITY

LEFT VENTRICULAR HYPERTROPHY

BORDERLINE PROLONGED QT INTERVAL

:

Confirmed by: Alvin Fountain MD 23-Jan-2020 07:55:38

## 2020-01-23 NOTE — ER DOCUMENT REPORT
ED General





- General


Chief Complaint: Seizure


Stated Complaint: POSSIBLE SEIZURE


TRAVEL OUTSIDE OF THE [place] IN LAST 30 DAYS: No





- HPI


Notes: 





Mr. Jha is a 34-year-old male with longstanding history of seizure disorder, 

alcohol abuse and noncompliance who is brought in by EMS at this time with 

multiple back-to-back seizures without regaining consciousness.  They were 

unable to obtain IV access in the field and gave the patient IM Versed.  Patient

was still seizing on arrival here.  No further history was obtainable from the 

patient.








- Related Data


Allergies/Adverse Reactions: 


                                        





hydrocodone [From Vicodin] Allergy (Verified 03/21/19 13:35)


   








Home Medications: has been off keppra for over a month due to money





Past Medical History





- General


Information source: Emergency Med Personnel, [facility] Records


Cannot obtain history due to: Altered mental status





- Social History


Smoking Status: Current Some Day Smoker


Chew tobacco use (# tins/day): No


Frequency of alcohol use: None


Family History: None, Reviewed & Not Pertinent


Patient has suicidal ideation: No


Patient has homicidal ideation: No





- Past Medical History


Cardiac Medical History: Reports: Hx Hypertension


Neurological Medical History: Reports: Hx Seizures


Renal/ Medical History: Denies: Hx Peritoneal Dialysis


Psychiatric Medical History: 


   Denies: Hx Depression


Traumatic Medical History: Reports: Hx Traumatic Brain Injury





- Immunizations


Immunizations up to date: No


Hx Diphtheria, Pertussis, Tetanus Vaccination: Yes





Review of Systems





- Review of Systems


-: Yes ROS unobtainable due to patient's medical condition





Physical Exam





- Vital signs


Vitals: 





                                        











Resp Pulse Ox


 


 26 H  87 L


 


 01/23/20 03:48  01/23/20 03:48














- Notes


Notes: 











GENERAL: Male patient of approximately stated age actively seizing on arrival 

here.





SKIN: Good turgor no rashes.





HEAD: Normocephalic atraumatic.





EYES: Eyes deviated to the left side.  Pupils dilated equal and sluggishly 

reactive to light.





EARS: CANALS AND TMS CLEAR.





NOSE: Nasal trumpet present in left nare with white mucus extruding from this.





MOUTH: Moist mucosa.  Thick white secretions in mouth.  Tongue abraded.  

Extremely poor dentition with advanced decay and multiple missing teeth.  No 

stridor or edema.  No drooling.





NECK: No masses or thyromegaly.





CHEST: Breathing spontaneously with breath sounds clear bilaterally.





HEART: Tachycardic regular rhythm.  No murmur gallop or rub.





ABDOMEN: Soft nontender without masses, organomegaly.





GENITALIA: Normal male incontinent of urine.





EXTREMITIES: No edema.  No calf tenderness.  Cap refill less than 1.5 seconds.  

Dorsalis pedis and posterior tibial pulses 3+ and symmetrical.





NEUROLOGICAL: Tonic-clonic seizures present











Course





- Re-evaluation


Re-evalutation: 





01/23/20 05:55


Difficult intubation requiring assistance of anesthesia service.  Airway 

subsequently successfully controlled and seizure activity terminated with 

administration of IV Ativan and Keppra.


01/23/20 05:58


Cedrick Draper NP will evaluate the patient for admission to the intensive care 

unit at this time.





- Vital Signs


Vital signs: 





                                        











Temp Pulse Resp BP Pulse Ox


 


 98.6 F      24 H  218/141 H  96 


 


 01/23/20 05:31     01/23/20 05:31  01/23/20 05:31  01/23/20 05:31














- Laboratory


Result Diagrams: 


                                 01/23/20 03:57





                                 01/23/20 03:57


Laboratory results interpreted by me: 





                                        











  01/23/20 01/23/20 01/23/20





  03:57 04:36 05:05


 


ABG pH    7.29 L


 


ABG pO2    76.2 L


 


ABG HCO3    17.9 L


 


ABG Total CO2    19.1 L


 


ABG O2 Saturation    93.8 L


 


Carbon Dioxide  8 L*  


 


Anion Gap  33 H  


 


Creatinine  1.68 H  


 


Est GFR ( Amer)  57 L  


 


Est GFR (MDRD) Non-Af  47 L  


 


Glucose  126 H  


 


Magnesium  2.9 H  


 


Total Protein  8.6 H  


 


Albumin  5.2 H  


 


Urine Protein   100 H 


 


Urine Ketones   TRACE H 


 


Urine Blood   MODERATE H 














Procedures





- Intubation


  ** Orotracheal


Airway evaluation: Copious secretions, Large tongue, Loose teeth, Other - [place]


Mallampati Classification: Class 2


Medications: Etomidate, Succinylcholine


Intubation method: Orotracheal - Multiple attempts at intubation with glide 

scope and Romaine 4 blade without success.  Anesthesia was subsequently 

brought in to assist with control of airway.





Critical Care Note





- Critical Care Note


Total time excluding time spent on procedures (mins): 35


Comments: 





Peripheral IV established and IV Ativan and Keppra administered.  Airway control

required at this time with intubation.





Discharge





- Discharge


Clinical Impression: 


 Status epilepticus





Condition: Critical


Disposition: ADMITTED AS INPATIENT


Admitting Provider: Kaye (Intensivist)


Unit Admitted: ICU

## 2020-01-23 NOTE — RADIOLOGY REPORT (SQ)
EXAM DESCRIPTION: 



XR CHEST 1 VIEW



COMPLETED DATE/TME:  01/23/2020 04:25



CLINICAL HISTORY: 



34 years, Male, post intubation



COMPARISON:

12/4/2019 chest



NUMBER OF VIEWS:

1



TECHNIQUE:

Portable chest



LIMITATIONS:

None.



FINDINGS:



The heart size is normal. Endotracheal tube with the tip 3.2 cm

above the sandra. Enteric tube, with the tip extending into the

upper abdomen. Lungs are clear. No pneumothorax



IMPRESSION:



Endotracheal and enteric tubes are in place. Lungs are clear

 



copyright 2011 [employer]- All Rights Reserved

## 2020-01-23 NOTE — CRITICAL CARE ADMISSION REPORT
HPI


Date:: 01/23/20


Time:: 05:45


Reason for ICU Reason:: On mechanically assisted ventilator


HPI: 





Mr. Jha is a 34-year-old male with a past medical history of seizure disorder 

for which he is known to be noncompliant with his medication, alcohol abuse, and

hypertension whom reportedly had back-to-back seizures starting at home and 

continued en route per EMS despite intramuscular Midazolam.  It is reported per 

EMS that the patient has not taken his Keppra in approximately 1 month due to 

financial reasons.  The patient was intubated in the emergency department and 

has been tachycardic as well as hypertensive since arrival, noting that he is 

also positive for cocaine on his toxicology.  Patient also noted to be 

hypovolemic with an MARKIE.  Mr. Jha will be admitted to ICU for management of 

the mechanical ventilator, hypertensive urgency likely from cocaine into

xication, hydration, and monitoring his acute kidney injury.


History obtained from:: ER physician and medical record





- Diagnosis/Plan


(1) Encounter for weaning from ventilator


Is this a current diagnosis for this admission?: Yes   


Plan: 


Continue current ventilator settings at this time. 


No need for repeat ABG or CXR at this time. 


ER physician suspects patient may have aspirated which may explain coarse 

bibasilar sounds and may see sequelae from aspiration in upcoming 48 hrs. No 

need for antibiotic therapy at this time. 








(2) Seizure


Is this a current diagnosis for this admission?: Yes   


Plan: 


Continue Keppra 1g BID which is home dose.


Obtain EEG.


Seizure precautions.


Needs education prior to discharge about importance of medication compliance. 


Affordability of medications needs further investigation to ensure compliance.











(3) History of alcohol use


Is this a current diagnosis for this admission?: Yes   


Plan: 


Monitor for alcohol withdrawal since current status unknown as to last drink and

level normal on admission.


Cessation counseling prior to discharge.








(4) Cocaine abuse with intoxication


Is this a current diagnosis for this admission?: Yes   


Plan: 


Treat HTN and tachycardia with Propofol/supplemental Benzodiazepines for now 

until cocaine metabolizes out of system.


Avoid anti-HTN therapy while cocaine metabolites still active.


Needs cocaine cessation counseling prior to discharge.








(5) Acute kidney injury


Is this a current diagnosis for this admission?: Yes   


Plan: 


Administer 1 liter LR bolus and initiate maintenance IV fluids at 150 ml/hr. 


Repeat BMP later today. Check CK level and repeat later today as well, 

monitoring for rhabdomyolysis.


Leon placed for strict I&O. 








(6) Dehydration


Is this a current diagnosis for this admission?: Yes   


Plan: 


Hydrate with IV fluids as above.








(7) Encephalopathy acute


Is this a current diagnosis for this admission?: Yes   


Plan: 


Multifactorial due to seizure as well as cocaine intoxication.


Reassess mental status as cocaine metabolizes and Keppra therapy continues.


EEG as above to r/o subclinical seizure.


Will obtain CBC to evaluate for leukocytosis.








(8) Hypertensive urgency


Is this a current diagnosis for this admission?: Yes   


Plan: 


Suspect due to acute cocaine intoxication.


Treat with Propofol/supplemental Benzos for now with reassessment.


Short term goal SBP <180 at this time and may be changed later today.


Resume home BP meds Lisinopril and Chlothalidone when clinically appropriate. 








(9) Metabolic acidosis


Is this a current diagnosis for this admission?: Yes   


Plan: 


Check lactic acid.


Suspect acidemia from hypovolemia and anticipate pH will improve with hydration.








(10) Noncompliance w/medication treatment due to intermit use of medication


Is this a current diagnosis for this admission?: Yes   


Plan: 


Investigation by speaking with patient and his family when present or more 

clinically appropriate to determine if there is some assistance, 

recommendations, or resources that may be provided.








(11) Tobacco abuse


Is this a current diagnosis for this admission?: Yes   


Plan: 


Clarify status with patient and monitor need for nicotine patch.


No patch at this time due to hypertensive urgency.








Past Medical History


Past Medical History: 





Limited medical Hx obtained from previous admission as patient is unable to 

provide information and there is no family present at this time.


Cardiac Medical History: Reports: Hypertension


Neurological Medical History: Reports: Seizures


Traumatic Medical History: Reports: Traumatic Brain Injury





Social/Family History





- Social History


Lives with: Other - unknown, but sounds like family called EMS


Smoking Status: Smoker,Current Status Unk - appears to be current daily smoker 

according to medical record from December 2019


Frequency of Alcohol Use: Occasional - per medical record but reviewing medical 

record sounds like patient may drink regularly; needs clarification


Hx Recreational Drug Use: Yes


Drugs: Cocaine - positive on current admission toxicology


Hx Prescription Drug Abuse: No





- Family History


Family History: Other - unable to determine at this time





- Medication/Allergies


Allergies/Adverse Reactions: 


                                        





hydrocodone [From Vicodin] Allergy (Verified 03/21/19 13:35)


   











Review of Systems


ROS unobtainable: Due to endotracheal tube - and patient inability to follow 

simple commands. No family present.





Physical Exam


Vital Signs: 


                                        











Temp Pulse Resp BP Pulse Ox


 


 98.5 F      15   189/136 H  97 


 


 01/23/20 05:16     01/23/20 05:16  01/23/20 05:16  01/23/20 05:16








                                 Intake & Output











 01/21/20 01/22/20 01/23/20





 06:59 06:59 06:59


 


Intake Total   100


 


Balance   100


 


Weight   80.8 kg








                                  Weight/Height





Weight                           80.8 kg


Height                           5 ft 9 in








General appearance: PRESENT: no acute distress, well-nourished


Head exam: PRESENT: atraumatic, normocephalic


Eye exam: PRESENT: other - conjunctiva pink, sclera white, PERRL and 2 mm brisk 

reaction bilaterally


Ear exam: PRESENT: normal external ear exam


Mouth exam: PRESENT: neck supple, other - intubated, no blood noted in 

oropharynx


Neck exam: ABSENT: carotid bruit, JVD, lymphadenopathy, tracheal deviation


Respiratory exam: PRESENT: crackles - coarse bilateral bases.  ABSENT: accessory

muscle use, tachypnea, wheezes


Cardiovascular exam: PRESENT: +S1, +S2, tachycardia - sinus tachycardia


Pulses: PRESENT: normal carotid pulses, normal radial pulses, +2 pedal pulses 

bilateral


Vascular exam: PRESENT: normal capillary refill


GI/Abdominal exam: PRESENT: hypoactive bowel sounds, soft.  ABSENT: distended, 

Barreto's sign


Rectal exam: PRESENT: deferred


Gentrourinary exam: PRESENT: indwelling catheter - placed in ED with 

concentrated appearing urine.  ABSENT: scrotal swelling, urethral discharge


Extremities exam: ABSENT: clubbing, joint swelling, pedal edema


Musculoskeletal exam: PRESENT: full ROM - passive, normal inspection.  ABSENT: 

deformity


Neurological exam: PRESENT: other - Intubated. Not following commands, opening 

eyes to noxious stimuli, or attempting to verbalize.


Psychiatric exam: PRESENT: other - unable to assess


Skin exam: PRESENT: dry, intact, normal color, warm, other - currently moist 

from urinary incontinence due to seizures on arrival per RN.  ABSENT: abrasion, 

cyanosis, erythema, jaundice, mottled, pallor, rash, skin tears


Tubes/Lines: PRESENT: Endotracheal Tube, Other - Leon, OG tube





Laboratory/Radiographs


Laboratory Results: 


                                        





                                 01/23/20 03:57 





                                 01/23/20 03:57 





                                        











  01/23/20 01/23/20 01/23/20





  03:57 03:57 04:36


 


WBC  Cancelled  


 


RBC  Cancelled  


 


Hgb  Cancelled  


 


Hct  Cancelled  


 


MCV  Cancelled  


 


MCH  Cancelled  


 


MCHC  Cancelled  


 


RDW  Cancelled  


 


Plt Count  Cancelled  


 


Seg Neutrophils %  Cancelled  


 


Carbonic Acid   


 


HCO3/H2CO3 Ratio   


 


ABG pH   


 


ABG pCO2   


 


ABG pO2   


 


ABG HCO3   


 


ABG O2 Saturation   


 


ABG Base Excess   


 


FiO2   


 


Sodium   142.6 


 


Potassium   3.7 


 


Chloride   102 


 


Carbon Dioxide   8 L* 


 


Anion Gap   33 H 


 


BUN   10 


 


Creatinine   1.68 H 


 


Est GFR ( Amer)   57 L 


 


Glucose   126 H 


 


Calcium   9.9 


 


Magnesium   2.9 H 


 


Total Bilirubin   0.4 


 


AST   44 


 


Alkaline Phosphatase   123 


 


Total Protein   8.6 H 


 


Albumin   5.2 H 


 


Urine Color    STRAW


 


Urine Appearance    CLEAR


 


Urine pH    5.0


 


Ur Specific Gravity    1.012


 


Urine Protein    100 H


 


Urine Glucose (UA)    NEGATIVE


 


Urine Ketones    TRACE H


 


Urine Blood    MODERATE H


 


Urine Nitrite    NEGATIVE


 


Ur Leukocyte Esterase    NEGATIVE


 


Urine WBC (Auto)    0


 


Urine RBC (Auto)    1














  01/23/20





  05:05


 


WBC 


 


RBC 


 


Hgb 


 


Hct 


 


MCV 


 


MCH 


 


MCHC 


 


RDW 


 


Plt Count 


 


Seg Neutrophils % 


 


Carbonic Acid  1.16


 


HCO3/H2CO3 Ratio  15:1


 


ABG pH  7.29 L


 


ABG pCO2  38.5


 


ABG pO2  76.2 L


 


ABG HCO3  17.9 L


 


ABG O2 Saturation  93.8 L


 


ABG Base Excess  -8.0


 


FiO2  40%


 


Sodium 


 


Potassium 


 


Chloride 


 


Carbon Dioxide 


 


Anion Gap 


 


BUN 


 


Creatinine 


 


Est GFR (African Amer) 


 


Glucose 


 


Calcium 


 


Magnesium 


 


Total Bilirubin 


 


AST 


 


Alkaline Phosphatase 


 


Total Protein 


 


Albumin 


 


Urine Color 


 


Urine Appearance 


 


Urine pH 


 


Ur Specific Gravity 


 


Urine Protein 


 


Urine Glucose (UA) 


 


Urine Ketones 


 


Urine Blood 


 


Urine Nitrite 


 


Ur Leukocyte Esterase 


 


Urine WBC (Auto) 


 


Urine RBC (Auto) 











EKG: 





No evidence of cardiac ischemia/infarction, sinus tachycardia present. 


All labs, radiographs, diagnostic studies and EKGs were personally reviewed: Yes





Critical Time


Critical Time (minutes): 70


-: 


The care of a critically ill patient is dynamic.  This note represents a static 

moment in the admission process. Orders and treatments may be given 

simultaneously and urgently, and time is not representative of the treatment 

process.





This patient requires Critical Care secondary to life threatening organ or limb 

dysfunction.  Without Critical Care services, the patient is at risk for increas

ed mortality and morbidity.

## 2020-01-23 NOTE — RADIOLOGY REPORT (SQ)
EXAM DESCRIPTION: 



CT HEAD WITHOUT IV CONTRAST



COMPLETED DATE/TME:  01/23/2020 04:26



CLINICAL HISTORY: 



34 years, Male, seizure



COMPARISON:

1/1/2020 CT



TECHNIQUE:

220  Images stored on PACS.

 

All CT scanners at this facility use dose modulation, iterative

reconstruction, and/or weight based dosing when appropriate to

reduce radiation dose to as low as reasonably achievable (ALARA).





[facility]: Dose Right [facility]: CareDose   [facility]: Dose Right    [facility]:

Teradose 4D    [facility]: Smart Technologies



LIMITATIONS:

None.



FINDINGS:



Endotracheal and enteric tubes are partially visualized. The

globes are intact. The paranasal sinuses and mastoid air cells

are well aerated. There is no displaced or depressed skull

fracture. There is no intra or extra-axial hemorrhage. Small

residual left frontal scalp hematoma. CT is limited for

evaluation of acute infarct. No CT evidence for large or

territorial acute infarct. No mass or midline shift





IMPRESSION:



Small residual left frontal scalp hematoma. Endotracheal and

enteric tubes are in place

 

TECHNICAL DOCUMENTATION:



Quality ID # 436: Final reports with documentation of one or more

dose reduction techniques (e.g., Automated exposure control,

adjustment of the mA and/or kV according to patient size, use of

iterative reconstruction technique)



copyright 2011 [employer]- All Rights Reserved

## 2020-01-24 NOTE — NEURO WORKBENCH EEG REPORT
EEG Report



Patient:  Isabel Jha

ID: [id number]

Referring Doctor: Cedrick Draper

Date: 01/24/2020

Reason for study: Evaluate Epileptiform activity



Medications: Keppra, Precedex, Famotidine, Porcine, Lisinopril, Ativan



History:

This is a 34 year old male with a history of traumatic brain injury, epilepsy, 
and hypertension who is intubated in the ICU. An EEG done on 01/23/2020 showed 
multifocal sharp waves, spindle coma, and BIRDs.



EEG Interpretation



This EEG was recorded in the ICU and the patient is intubated. There were no 
apparent spontaneous eye openings or closings, but the EEG tech manually opened 
and closed the patients eyes multiple times. There is no posterior dominant 
rhythm present. 



The backgound EEG shows predominantly diffuse polymorphic low amplitude delta 
activity with intermixed low amplitude beta activity activity. The background is
typically a monotonous alternating pattern with periods of global amplitude 
attenuation alternating with periods of frontally predominant higher amplitude 
beta activity (spindles). This EEG background is consistent with spindle coma 
(not a burst-suppression pattern). There were no obvious asymmetries in 
amplitude or frequencies. Photic stimulation was done and photic driving was not
noted. The EEG is at times reactive after stimuli showing briefly a more 
continuous pattern of superimposed faster frequency EEG activity (theta and 
alpha frequency) than the typical spindle coma background. Reactivity was noted 
after the patient coughed and rarely with stimulation by the tech.



There were several convincing bi-temporal sharp waves, and more frequent poorly 
formed sharply contoured waveforms in the right temporal region. There were 
multiple (approximately ten) Brief Potentially Ictal Rhythmic Discharges also 
known as B(I)RDs characterized by bi-frontal sharply contoured rhythmic activity
typically 10-12 Hz that was higher amplitude than the background activity 
(approximately 2-4 times higher) and typically lasting about 1 second or less. 
There were no clinical or subclinical seizures.



The EKG showed a regular rhythm with rates typically in the 60-70 range.





EEG Impression

This is a markedly abnormal EEG and consistent with diffuse cerebral dysfunction
which is non-specific for etiology. A spindle coma pattern may be seen in 
patients with head injury which may be consistent with the patients reported 
history of traumatic brain injury if this is the reason for the patients 
current hospitalization (not sure if this is acute or a remote history). Spindle
coma has also been reported in the setting of acute cerebral anoxia, viral 
encephalitis, thalamic or brainstem lesions, subarachnoid hemorrhage, and drug 
intoxication (not an exhaustive differential). Todays study did show some 
reactivity to stimulation, while the prior days EEG did not.



There were persistent bi-temporal sharp waves on todays EEG which is suggestive
of potentially bi-temporal epileptogenic foci. I did not appreciate any marked 
reduction in the frequency of these compared to the prior days study, but they 
do seem more convincingly confined to the bi-temporal areas.



There were persistent Brief Potentially Ictal Rhythmic Discharges also known as 
B(I)RDs noted on todays study, which appear more frequent than on the prior 
days EEG. It is noted that the patient has been started on Keppra, and checking
serum drug levels may be of interest.





INTERPRETING NEUROLOGIST:  

Joe Allred MD

Board certified by the American Academy of Neurology and Psychiatry in 
Neurology, Clinical Neurophysiology, and Sleep Medicine

[facility]

## 2020-01-24 NOTE — RADIOLOGY REPORT (SQ)
EXAM DESCRIPTION:  CHEST SINGLE VIEW



COMPLETED DATE/TIME:  1/24/2020 5:19 am



REASON FOR STUDY:  respiratory failure; r/o infiltrate



COMPARISON:  1/23/2020



EXAM PARAMETERS:  NUMBER OF VIEWS: One view.

TECHNIQUE: Single frontal radiographic view of the chest acquired.

RADIATION DOSE: NA

LIMITATIONS: None.



FINDINGS:  LUNGS AND PLEURA: No opacities, masses or pneumothorax. No pleural effusion.

MEDIASTINUM AND HILAR STRUCTURES: No masses.  Contour normal.

HEART AND VASCULAR STRUCTURES: Heart normal in size.  Normal vasculature.

BONES: No acute findings.

HARDWARE: Endotracheal tube has been retracted now 6 cm above the sandra.  Esophagogastric tube tip a
nd side-hole are below the GE junction out of the field of view.

OTHER: No other significant finding.



IMPRESSION:

1. No acute cardiopulmonary disease.

2. Endotracheal tube has been retracted to the upper trachea now 6 cm above the sandra.  Recommend ad
vancing 2-3 cm.



TECHNICAL DOCUMENTATION:  JOB ID:  [id number]

 2011 [employer]- All Rights Reserved



Reading location - IP/workstation name: [id number]

## 2020-01-24 NOTE — PDOC CRITICAL CARE PROG REPORT
General


Date:: 01/24/20


ICU Day:: 2


Ventilator Day:: 2


Hospital Day:: 2


Resuscitation Status: Full Code


Events in the past 12 to 24 Hours:: 





Probable cocain WD not seizures. Answered yes or no questions appropriately over

night. Occasionally highly agitated.


Review of systems relevant to events:: 





Neuro


Reason for ICU Addmission:: On mechanically assisted ventilator





- Medications:


Medications reviewed and adjusted accordingly: Yes


Vasopressors:: 





None


Sedation:: 





Diprivan





Physical Exam


Vital Signs: 


                                        











Temp Pulse Resp BP Pulse Ox


 


 99.7 F   70   15   103/83   100 


 


 01/24/20 07:53  01/24/20 07:53  01/24/20 07:53  01/24/20 07:53  01/24/20 07:53








                                 Intake & Output











 01/23/20 01/24/20 01/25/20





 06:59 06:59 06:59


 


Intake Total 206 1627 


 


Output Total  3125 25


 


Balance 206 -1498 -25


 


Weight 80.8 kg 75.9 kg 








                                  Weight/Height





Weight                           75.9 kg


Height                           5 ft 9 in








General appearance: PRESENT: no acute distress, well-developed, well-nourished


Head exam: PRESENT: atraumatic, normocephalic


Eye exam: PRESENT: PERRLA


Ear exam: PRESENT: normal external ear exam


Mouth exam: PRESENT: moist, tongue midline


Respiratory exam: PRESENT: clear to auscultation dana.  ABSENT: rales, rhonchi, 

wheezes


Cardiovascular exam: PRESENT: RRR.  ABSENT: diastolic murmur, rubs, systolic 

murmur


Vascular exam: PRESENT: normal capillary refill


GI/Abdominal exam: PRESENT: normal bowel sounds, soft.  ABSENT: distended, 

guarding, mass, organolmegaly, rebound, tenderness


Rectal exam: PRESENT: deferred


Gentrourinary exam: PRESENT: indwelling catheter


Extremities exam: PRESENT: other - R arm swollen more than L. Difficult IV to 

obtain. Probable infiltrate. Low risk for dvt in subclavian ar axillary.


Musculoskeletal exam: PRESENT: normal inspection


Neurological exam: PRESENT: altered, other - Sedated but has been appropriate.


Skin exam: PRESENT: dry, intact, warm.  ABSENT: cyanosis, rash


Tubes/Lines: PRESENT: Endotracheal Tube, Nasogastic Tube





Laboratory/Radiographs


Laboratory Results: 


                                        





                                 01/23/20 06:15 





                                 01/23/20 12:49 





                                        











  01/23/20 01/23/20 01/24/20





  12:49 12:49 04:15


 


Carbonic Acid    0.93 L


 


HCO3/H2CO3 Ratio    21:1


 


ABG pH    7.42


 


ABG pCO2    31.0 L


 


ABG pO2    95.7


 


ABG HCO3    19.7 L


 


ABG O2 Saturation    97.5


 


ABG Base Excess    -3.7


 


FiO2    30%


 


Sodium  140.4  


 


Potassium  3.2 L  


 


Chloride  106  


 


Carbon Dioxide  21 L D  


 


Anion Gap  13  


 


BUN  10  


 


Creatinine  1.24  


 


Est GFR ( Amer)  > 60  


 


Glucose  54 L  


 


Lactic Acid   1.9 


 


Calcium  9.4  


 


Triglycerides  93  








                                        











  01/23/20 01/23/20





  03:07 12:49


 


Creatine Kinase  448 H  794 H











Impressions: 


                                        





Head CT  01/23/20 04:26


IMPRESSION:


 


Small residual left frontal scalp hematoma. Endotracheal and


enteric tubes are in place


 


TECHNICAL DOCUMENTATION:


 


Quality ID # 436: Final reports with documentation of one or more


dose reduction techniques (e.g., Automated exposure control,


adjustment of the mA and/or kV according to patient size, use of


iterative reconstruction technique)


 


copyright 2011 [employer]- All Rights Reserved


 











All labs, radiographs, diagnostic studies and EKGs were personally reviewed: Yes


In addition, reports of radiographic and diagnostic studies were read: Yes





Assessment and Plan





- Diagnosis


(1) Status epilepticus, generalized convulsive


Is this a current diagnosis for this admission?: Yes   


Plan: 


This is the most serious problem.  He receive extra keppra,ativa and one dose of

dilantin yesterday. Howeve he had woken up enough to respond to simple questions

with a thumps up or down sign. We will check additional EEG but with this 

information he is acting more like cocaine withdrawal.








(2) Acute respiratory failure


Qualifiers: 


   Respiratory failure complication: unspecified whether with hypoxia or 

hypercapnia   Qualified Code(s): J96.00 - Acute respiratory failure, unspecified

whether with hypoxia or hypercapnia   


Is this a current diagnosis for this admission?: Yes   


Plan: 


Intubated for airway protection and use of high dose sedation. Would like to 

obtain EEG, PICC line for iv access as his are poor and his US guided R basilic 

IV has infiltrated and then work towards extubation.








(3) Cocaine abuse with intoxication


Is this a current diagnosis for this admission?: Yes   


Plan: 


This lowered his seizure threshold and may be responsible for his EEG 

appeaarance.








(4) Alcohol abuse


Is this a current diagnosis for this admission?: Yes   


Plan: 


This is common in cocaine abuse. We do not no the extent of his drinking  at 

this time.








(5) Noncompliance w/medication treatment due to intermit use of medication


Is this a current diagnosis for this admission?: Yes   


Plan: 


Lack of keppra and cocaine is probable cause of seizures.








(6) Seizure


Is this a current diagnosis for this admission?: Yes   


Plan: 


No obvious seizures since admission. Possibility of subclinical seizures 

prompted the EEG.








(7) Infiltration of peripherally inserted central catheter (PICC)


Qualifiers: 


   Encounter type: initial encounter   Qualified Code(s): T82.898A - Other s

pecified complication of vascular prosthetic devices, implants and grafts, 

initial encounter   


Is this a current diagnosis for this admission?: Yes   


Plan: 


He has no reason for a clot in his R arm but with IV difficulty certainly an 

infiltrate is more likely.





Plan Summary: 





Obtain EEG, PICC then hopefully extubate.





Critical Time


Critical Time (minutes): 40


Level of Care: ICU


Anticipated discharge: Home


Within: within 72 hours


-: 


1.  The care of a critical patient is a dynamic process.  This note is a 

representative synopsis but static in nature.  The timeframe for treatments 

given in order is not necessarily the actual time these treatments may have been

done.





2.  This patient requires critical care secondary to ongoing requirements for 

therapy not offered or safe outside the critical care environment.  Transfer to 

a lower level of care will result in altered life or limb morbidity and 

mortality.





3.  Multidisciplinary rounds completed.





4.  ABCDE bundle addressed.

## 2020-01-25 NOTE — PDOC DISCHARGE SUMMARY
Impression





- Admit/DC Date/PCP


Admission Date/Primary Care Provider: 


  01/23/20 06:24





  





Discharge Date: 01/25/20





- Discharge Diagnosis


(1) Status epilepticus, generalized convulsive


Is this a current diagnosis for this admission?: Yes   





(2) Cocaine abuse with intoxication


Is this a current diagnosis for this admission?: Yes   





(3) Alcohol abuse


Is this a current diagnosis for this admission?: Yes   





(4) Noncompliance w/medication treatment due to intermit use of medication


Is this a current diagnosis for this admission?: Yes   





(5) Seizure


Is this a current diagnosis for this admission?: Yes   





(6) Infiltration of peripherally inserted central catheter (PICC)


Is this a current diagnosis for this admission?: Yes   








- Assessment


Summary: 


This patient is a 35 yo man with a long hx of substance abuse. He tested 

positive for cocaine, had a seizure. Cocaine can lower seizure threshold. 

Intubated for airway protection as his first GCS was lower than 9. He eventually

woke up enough to be extubated. Since then he has been ambulatory, eating a 

regular diet, back on keppra as he has a hx of non-compliance. Said to have a hx

of alcohol abuse. Unknown if alcohol is a primary or secondary issue to cope 

with cocaine or vice versa. His Cr was elevated to 1.7 about .4 above normal not

enough to qualify for ARF but certainly for [facility]IE.  At this point he is stable for

discharge home. We will make sure he has keppra available. He had an EEG 

suggestive of continued seizure prompting loading with dilantin, extra 1gm of 

keppra and a second EEG showed resolved and my suspicion is this was cocaine WD 

as he is fully neurologically intact at discharge. His IV access was difficult 

to obtain. A PICC was suggested while he was vented but no one was available for

consent. He had a large infiltration on his R arm that nearly completely 

resolved by discharge.





- Additional Information


Resuscitation Status: Full Code


Discharge Diet: As Tolerated


Discharge Activity: Activity As Tolerated


Home Medications: 








Levetiracetam [Keppra] 1,000 mg PO Q12 01/23/20 


Lisinopril [Zestril] 40 mg PO DAILY 01/23/20 











History of Present Illiness


History of Present Illness: 


TIBURCIO LOGAN is a 34 year old male








Hospital Course


Hospital Course: 


See previous note. Extubated no further seizures and discharge making sure he 

had access to [facility].





Physical Exam


Vital Signs: 





                                        











Temp Pulse Resp BP Pulse Ox


 


 98.3 F   85   21 H  144/106 H  97 


 


 01/25/20 08:00  01/25/20 08:00  01/25/20 08:00  01/25/20 08:00  01/25/20 08:00








                                 Intake & Output











 01/24/20 01/25/20 01/26/20





 06:59 06:59 06:59


 


Intake Total 1627 2912 


 


Output Total 3125 777 


 


Balance -1498 2135 


 


Weight 75.9 kg 78.4 kg 











General appearance: PRESENT: no acute distress, well-developed, well-nourished


Head exam: PRESENT: atraumatic, normocephalic


Eye exam: PRESENT: conjunctiva pink, EOMI, PERRLA.  ABSENT: scleral icterus


Ear exam: PRESENT: normal external ear exam


Mouth exam: PRESENT: moist, tongue midline


Respiratory exam: PRESENT: clear to auscultation dana.  ABSENT: rales, rhonchi, 

wheezes


Cardiovascular exam: PRESENT: RRR.  ABSENT: diastolic murmur, rubs, systolic 

murmur


Vascular exam: PRESENT: normal capillary refill


GI/Abdominal exam: PRESENT: normal bowel sounds, soft.  ABSENT: distended, 

guarding, mass, organolmegaly, rebound, tenderness


Rectal exam: PRESENT: deferred


Extremities exam: PRESENT: full ROM.  ABSENT: calf tenderness, clubbing, pedal 

edema


Neurological exam: PRESENT: alert, awake, oriented to person, oriented to place,

 oriented to time, oriented to situation, CN II-XII grossly intact.  ABSENT: m

otor sensory deficit


Skin exam: PRESENT: dry, intact, warm.  ABSENT: cyanosis, rash





Results


Laboratory Results: 





                                        











WBC  6.2 10^3/uL (4.0-10.5)   01/25/20  04:46    


 


RBC  4.51 10^6/uL (4.35-5.55)   01/25/20  04:46    


 


Hgb  14.2 g/dL (13.5-17.0)   01/25/20  04:46    


 


Hct  41.4 % (37.9-51.0)   01/25/20  04:46    


 


MCV  92 fl (80-97)   01/25/20  04:46    


 


MCH  31.5 pg (27.0-33.4)   01/25/20  04:46    


 


MCHC  34.3 g/dL (32.0-36.0)   01/25/20  04:46    


 


RDW  13.6 % (11.5-14.0)   01/25/20  04:46    


 


Plt Count  207 10^3/uL (150-450)   01/25/20  04:46    


 


Lymph % (Auto)  31.3 % (13-45)   01/25/20  04:46    


 


Mono % (Auto)  14.1 % (3-13)  H  01/25/20  04:46    


 


Eos % (Auto)  0.4 % (0-6)   01/25/20  04:46    


 


Baso % (Auto)  1.0 % (0-2)   01/25/20  04:46    


 


Absolute Neuts (auto)  3.3 10^3/uL (1.7-8.2)   01/25/20  04:46    


 


Absolute Lymphs (auto)  1.9 10^3/uL (0.5-4.7)   01/25/20  04:46    


 


Absolute Monos (auto)  0.9 10^3/uL (0.1-1.4)   01/25/20  04:46    


 


Absolute Eos (auto)  0.0 10^3/uL (0.0-0.6)   01/25/20  04:46    


 


Absolute Basos (auto)  0.1 10^3/uL (0.0-0.2)   01/25/20  04:46    


 


Total Counted  100   01/23/20  06:15    


 


Seg Neutrophils %  53.2 % (42-78)   01/25/20  04:46    


 


Seg Neuts % (Manual)  88 % (42-78)  H  01/23/20  06:15    


 


Lymphocytes % (Manual)  7 % (13-45)  L  01/23/20  06:15    


 


Monocytes % (Manual)  4 % (3-13)   01/23/20  06:15    


 


Eosinophils % (Manual)  1 % (0-6)   01/23/20  06:15    


 


Basophils % (Manual)  0 % (0-2)   01/23/20  06:15    


 


Abs Neuts (Manual)  13.6 10^3/uL (1.7-8.2)  H  01/23/20  06:15    


 


Abs Lymphs (Manual)  1.1 10^3/uL (0.5-4.7)   01/23/20  06:15    


 


Abs Monocytes (Manual)  0.6 10^3/uL (0.1-1.4)   01/23/20  06:15    


 


Absolute Eos (Manual)  0.2 10^3/uL (0.0-0.6)   01/23/20  06:15    


 


Abs Basophils (Manual)  0.0 10^3/uL (0.0-0.2)   01/23/20  06:15    


 


Platelet Estimate  Cancelled   01/23/20  03:57    


 


Platelet Comment  ADEQUATE   01/23/20  06:15    


 


RBC Morph Comment  NORMO-CYTIC/CHROMIC   01/23/20  06:15    


 


PT  15.7 SEC (11.4-15.4)  H  01/23/20  03:07    


 


INR  1.24   01/23/20  03:07    


 


Carbonic Acid  0.93 mmol/L (1.05-1.35)  L  01/24/20  04:15    


 


HCO3/H2CO3 Ratio  21:1   01/24/20  04:15    


 


ABG pH  7.42  (7.35-7.45)   01/24/20  04:15    


 


ABG pCO2  31.0 mmHg (35-45)  L  01/24/20  04:15    


 


ABG pO2  95.7 mmHg ()   01/24/20  04:15    


 


ABG HCO3  19.7 mmol/L (20-24)  L  01/24/20  04:15    


 


ABG Total CO2  20.6 mmol/L (23-27)  L  01/24/20  04:15    


 


ABG O2 Saturation  97.5 % (94-98)   01/24/20  04:15    


 


ABG Base Excess  -3.7 mmol/L  01/24/20  04:15    


 


FiO2  30%   01/24/20  04:15    


 


Sodium  144.4 mmol/L (137-145)   01/25/20  04:46    


 


Potassium  3.3 mmol/L (3.6-5.0)  L  01/25/20  04:46    


 


Chloride  111 mmol/L ()  H  01/25/20  04:46    


 


Carbon Dioxide  23 mmol/L (22-30)   01/25/20  04:46    


 


Anion Gap  10  (5-19)   01/25/20  04:46    


 


BUN  8 mg/dL (7-20)   01/25/20  04:46    


 


Creatinine  1.21 mg/dL (0.52-1.25)   01/25/20  04:46    


 


Est GFR ( Amer)  > 60  (>60)   01/25/20  04:46    


 


Est GFR (MDRD) Non-Af  > 60  (>60)   01/25/20  04:46    


 


Glucose  76 mg/dL ()   01/25/20  04:46    


 


POC Glucose  87 mg/dL ()   01/24/20  18:57    


 


Lactic Acid  1.9 mmol/L (0.7-2.1)   01/23/20  12:49    


 


Calcium  8.9 mg/dL (8.4-10.2)   01/25/20  04:46    


 


Magnesium  2.9 mg/dL (1.6-2.3)  H  01/23/20  03:57    


 


Total Bilirubin  0.4 mg/dL (0.2-1.3)   01/23/20  03:57    


 


Direct Bilirubin  0.0 mg/dL (0.0-0.4)   01/23/20  03:57    


 


Neonat Total Bilirubin  Not Reportable   01/23/20  03:57    


 


Neonat Direct Bilirubin  Not Reportable   01/23/20  03:57    


 


Neonat Indirect Bili  Not Reportable   01/23/20  03:57    


 


AST  44 U/L (17-59)   01/23/20  03:57    


 


ALT  24 U/L (<50)   01/23/20  03:57    


 


Alkaline Phosphatase  123 U/L ()   01/23/20  03:57    


 


Creatine Kinase  794 U/L ()  H  01/23/20  12:49    


 


Total Protein  8.6 g/dL (6.3-8.2)  H  01/23/20  03:57    


 


Albumin  5.2 g/dL (3.5-5.0)  H  01/23/20  03:57    


 


Triglycerides  93 mg/dL (<150)   01/23/20  12:49    


 


Urine Color  STRAW   01/23/20  04:36    


 


Urine Appearance  CLEAR   01/23/20  04:36    


 


Urine pH  5.0  (5.0-9.0)   01/23/20  04:36    


 


Ur Specific Gravity  1.012   01/23/20  04:36    


 


Urine Protein  100 mg/dL (NEGATIVE)  H  01/23/20  04:36    


 


Urine Glucose (UA)  NEGATIVE mg/dL (NEGATIVE)   01/23/20  04:36    


 


Urine Ketones  TRACE mg/dL (NEGATIVE)  H  01/23/20  04:36    


 


Urine Blood  MODERATE  (NEGATIVE)  H  01/23/20  04:36    


 


Urine Nitrite  NEGATIVE  (NEGATIVE)   01/23/20  04:36    


 


Urine Bilirubin  NEGATIVE  (NEGATIVE)   01/23/20  04:36    


 


Urine Urobilinogen  NEGATIVE mg/dL (<2.0)   01/23/20  04:36    


 


Ur Leukocyte Esterase  NEGATIVE  (NEGATIVE)   01/23/20  04:36    


 


Urine WBC (Auto)  0 /HPF  01/23/20  04:36    


 


Urine RBC (Auto)  1 /HPF  01/23/20  04:36    


 


U Hyaline Cast (Auto)  3 /LPF  01/23/20  04:36    


 


Urine Mucus (Auto)  RARE /LPF  01/23/20  04:36    


 


Urine Ascorbic Acid  NEGATIVE  (NEGATIVE)   01/23/20  04:36    


 


Urine Opiates Screen  NEGATIVE   01/23/20  04:36    


 


Urine Methadone Screen  NEGATIVE   01/23/20  04:36    


 


Ur Barbiturates Screen  NEGATIVE   01/23/20  04:36    


 


Ur Phencyclidine Scrn  NEGATIVE   01/23/20  04:36    


 


Ur Amphetamines Screen  NEGATIVE   01/23/20  04:36    


 


U Benzodiazepines Scrn  UNCONFIRMED POSITIVE   01/23/20  04:36    


 


Urine Cocaine Screen  UNCONFIRMED POSITIVE   01/23/20  04:36    


 


U Marijuana (THC) Screen  NEGATIVE   01/23/20  04:36    


 


Serum Alcohol  < 10 mg/dL (NONE DETECTED)   01/23/20  03:57    


 


Slides for Path Review  Cancelled   01/23/20  03:57    











EKG Comments: 





NSR with no ectopy noted or ischemia.


Impressions: 





                                        





Chest X-Ray  01/23/20 04:25


IMPRESSION:


 


Endotracheal and enteric tubes are in place. Lungs are clear


 


 


copyright 2011 [employer]- All Rights Reserved


 








Head CT  01/23/20 04:26


IMPRESSION:


 


Small residual left frontal scalp hematoma. Endotracheal and


enteric tubes are in place


 


TECHNICAL DOCUMENTATION:


 


Quality ID # [id number]: Final reports with documentation of one or more


dose reduction techniques (e.g., Automated exposure control,


adjustment of the mA and/or kV according to patient size, use of


iterative reconstruction technique)


 


copyright 2011 [employer]- All Rights Reserved


 








Chest X-Ray  01/24/20 00:00


IMPRESSION:


1. No acute cardiopulmonary disease.


2. Endotracheal tube has been retracted to the upper trachea now 6 cm above the 

sandra.  Recommend advancing 2-3 cm.


 














Plan


Health Concerns: 


Continued non-compliance and substance abuse. Does not want rehab.


Plan of Treatment: 


Home to return to previous medications.


Goals: 


Sobriety


Critical Time: 35


Level of Care: MEDICAL





Stroke


Is this a Stroke Patient?: No





Acute Heart Failure





- **


Is this a Heart Failure Patient?: No

## 2020-02-05 NOTE — RADIOLOGY REPORT (SQ)
EXAM DESCRIPTION: 



CT CERVICAL SPINE WITHOUT IV CONTRAST



COMPLETED DATE/TME:  02/05/2020 06:38



CLINICAL HISTORY: sz/ams. Neck pain.



COMPARISON: 5/8/2018



TECHNIQUE: Axial CT of the cervical spine obtained without

contrast.



FINDINGS: 

Reversal of the cervical lordosis may be secondary to patient

positioning or altered muscle tone.  The atlantoaxial,

atlantodental, and occipitoatlantal intervals are preserved.  No

fracture identified. Vertebral body height preserved. 

Prevertebral soft tissues are unremarkable.



Mild loss of intervertebral disc height at C4/5 and C5/6 with

endplate spondylosis, uncovertebral spurring, and facet

arthropathy.  



Visualized skull base is intact.  No fracture of the visualized

facial bones. Visualized mastoid air cells and paranasal sinuses

are well aerated.



Visualized thyroid is unremarkable.  No cervical lymphadenopathy.

No pneumothorax in the visualized lung apices.







IMPRESSION:

1.  No acute fracture or subluxation of the cervical spine.



2.  Multilevel degenerative change of the spine.



This exam was performed according to our departmental

dose-optimization program, which includes automated exposure

control, adjustment of the mA and/or kV according to patient size

and/or use of iterative reconstruction technique.

## 2020-02-05 NOTE — RADIOLOGY REPORT (SQ)
EXAM DESCRIPTION: 



CT HEAD WITHOUT IV CONTRAST



COMPLETED DATE/TME:  02/05/2020 06:38



CLINICAL HISTORY: sz/ams



COMPARISON: 1/23/2020



TECHNIQUE: Axial CT of the head obtained from the skull apex to

the skull base without contrast.



FINDINGS: 

No acute intracranial hemorrhage identified. No mass, mass

effect, shift of the midline, abnormal extra-axial fluid

collection or CT evidence of acute ischemic change identified.

The ventricular system is unremarkable.  No acute abnormalities

of the supratentorial white matter, basal ganglia, cerebellum, or

brainstem.



The visualized paranasal sinuses and the mastoids are clear.

Contusion in the left frontal scalp subcutaneous soft tissues. No

skull fracture identified.  Visualized orbits and globes are

unremarkable.



IMPRESSION:



1.  No acute intracranial abnormality identified.



This exam was performed according to our departmental

dose-optimization program, which includes automated exposure

control, adjustment of the mA and/or kV according to patient size

and/or use of iterative reconstruction technique.

## 2020-02-05 NOTE — ER DOCUMENT REPORT
ED General





- General


Chief Complaint: Seizure


Stated Complaint: DECREASED LOC


Time Seen by Provider: 02/05/20 06:36


Mode of Arrival: Medic


Information source: Emergency Med Personnel


TRAVEL OUTSIDE OF THE [place] IN LAST 30 DAYS: No





- HPI


Notes: 





Patient is brought in by EMS from home secondary to seizures.  EMS states they 

found the patient postictal with some low saturations were patient was placed on

oxygen on the right here.  Patient was never conversant with them.  Patient has 

a long history of numerous visits to the emergency department for seizures and 

uncontrolled hypertension as well as alcohol and drug abuse.  Apparently patient

hit his head at home per emergency medicine personnel during the seizure.  

Patient is unable to give me any history.  Patient symptoms are currently 

constant.  They are severe.  Nothing appears to make them better or worse.  His 

main symptoms are decreased responsiveness postictal.  And apparent 

intoxication.





- Related Data


Allergies/Adverse Reactions: 


                                        





hydrocodone [From Vicodin] Allergy (Verified 02/05/20 05:58)


   











Past Medical History





- General


Information source: [facility] Records





- Social History


Smoking Status: Current Every Day Smoker


Frequency of alcohol use: Heavy


Drug Abuse: Cocaine


Family History: Reviewed & Not Pertinent, Other - unable to determine at this 

time


Patient has suicidal ideation: No


Patient has homicidal ideation: No





- Past Medical History


Cardiac Medical History: Reports: Hx Hypertension


Neurological Medical History: Reports: Hx Seizures


Renal/ Medical History: Denies: Hx Peritoneal Dialysis


Psychiatric Medical History: 


   Denies: Hx Depression


Traumatic Medical History: Reports: Hx Traumatic Brain Injury





- Immunizations


Immunizations up to date: No


Hx Diphtheria, Pertussis, Tetanus Vaccination: Yes





Review of Systems





- Review of Systems


-: Yes ROS unobtainable due to patient's medical condition - Patient is 

postictal and will not answer questions





Physical Exam





- Vital signs


Vitals: 





                                        











Temp Pulse Resp BP Pulse Ox


 


 98 F   87   18   199/136 H  89 L


 


 02/05/20 05:28  02/05/20 05:28  02/05/20 05:28  02/05/20 05:28  02/05/20 05:28











Interpretation: Hypertensive, Hypoxic





- General


General appearance: Alert, Lethargic


In distress: None





- HEENT


Head: Normocephalic, Atraumatic


Eyes: Normal


Pupils: PERRL





- Respiratory


Respiratory status: No respiratory distress


Chest status: Nontender


Breath sounds: Normal


Chest palpation: Normal





- Cardiovascular


Rhythm: Tachycardia


Heart sounds: Normal auscultation


Murmur: No





- Abdominal


Inspection: Normal


Distension: No distension


Bowel sounds: Normal


Tenderness: Nontender


Organomegaly: No organomegaly





- Back


Back: Normal, Nontender





- Extremities


General upper extremity: Normal inspection, Nontender, Normal color, Normal ROM,

Normal temperature


General lower extremity: Normal inspection, Nontender, Normal color, Normal ROM,

Normal temperature, Normal weight bearing.  No: Nati's sign





- Neurological


Neuro grossly intact: Yes


Cognition: Confused


Orientation: Disoriented to place, Disoriented to time


[place] Coma Scale Eye Opening: To Pain


[place] Coma Scale Verbal: Confused


[place] Coma Scale Motor: Obeys Commands


[place] Coma Scale Total: 12


Speech: Normal


Motor strength normal: LUE, RUE, LLE, RLE


Sensory: Normal





- Psychological


Associated symptoms: Normal mood, Psychomotor depression, Uncooperative





- Skin


Skin Temperature: Warm


Skin Moisture: Dry


Skin Color: Normal





Course





- Re-evaluation


Re-evalutation: 





02/05/20 16:28


Patient arrived postictal and hypertensive.  He was treated with metoprolol and 

clonidine.  He has had a significant reduction of his blood pressure at this 

time.  He apparently is noncompliant with his medications and states he does not

currently have a prescription for any.  I will give him a prescription for 

Keppra and for amlodipine.  Patient was loaded with Keppra here.  Patient is 

positive for cocaine once again today.  At this time patient is no longer 

tachycardic.  His saturations are good.  Patient is awake alert and very 

conversant.  He states he wants to go home.  At this time I do not feel that the

patient would benefit from further treatment in the hospital.





- Vital Signs


Vital signs: 





                                        











Temp Pulse Resp BP Pulse Ox


 


 98 F   87   18   157/76 H  97 


 


 02/05/20 05:28  02/05/20 05:28  02/05/20 14:30  02/05/20 14:30  02/05/20 12:30














- Laboratory


Result Diagrams: 


                                 02/05/20 05:54





                                 02/05/20 05:54





- Diagnostic Test


Radiology reviewed: Image reviewed, Reports reviewed





Discharge





- Discharge


Clinical Impression: 


 Uncontrolled hypertension, Cocaine abuse, Seizure, Cocaine abuse with intoxi

cation





Altered mental status


Qualifiers:


 Altered mental status type: disorientation Qualified Code(s): R41.0 - 

Disorientation, unspecified





Condition: Stable


Disposition: HOME, SELF-CARE


Instructions:  High Blood Pressure, Requiring Treatment ([facility]), Cocaine Abuse 

([facility])


Additional Instructions: 


Please take your medicines as prescribed.


Prescriptions: 


Levetiracetam [Keppra 500 mg Tablet] 500 mg PO Q12 #60 tablet


Amlodipine Besylate [Norvasc 10 mg Tablet] 10 mg PO DAILY #30 tablet


Referrals: 


[facility] [Provider Group] - Follow up in 3-5 days

## 2020-03-01 NOTE — RADIOLOGY REPORT (SQ)
EXAM DESCRIPTION:  CHEST SINGLE VIEW



COMPLETED DATE/TIME:  3/1/2020 12:03 pm



REASON FOR STUDY:  seizure



COMPARISON:  1/24/2020.



EXAM PARAMETERS:  NUMBER OF VIEWS: One view.

TECHNIQUE: Single frontal radiographic view of the chest acquired.

RADIATION DOSE: NA

LIMITATIONS: None.



FINDINGS:  LUNGS AND PLEURA: Faint linear densities in the right lung base.  Otherwise clear.

MEDIASTINUM AND HILAR STRUCTURES: No masses.  Contour normal.

HEART AND VASCULAR STRUCTURES: Heart upper limits of normal in size.  Normal vasculature.

BONES: No acute findings.

HARDWARE: Endotracheal tube, tip located 3 cm proximal to the sandra.

OTHER: No other significant finding.



IMPRESSION:  ENDOTRACHEAL TUBE APPEARS IN SATISFACTORY POSITION.  ATELECTASIS IN THE RIGHT LUNG BASE.




TECHNICAL DOCUMENTATION:  JOB ID:  [id number]

 2011 [employer]- All Rights Reserved



Reading location - IP/workstation name: [email or web address]

## 2020-03-01 NOTE — ER DOCUMENT REPORT
ED Seizure





- General


Chief Complaint: Seizure


Stated Complaint: POSSIBLE SEIZURE


Mode of Arrival: Medic


Information source: Emergency Med Personnel





- HPI


Patient complains to provider of: History of seizures


Number of episodes: 9


Severity: Severe - pt with h/o seizures non-compliant with meds with h/o 9 

tonic-clonic seizures since earlier this am.  EMS notified - pt in status 

epilepticus, intubated with RSI protocol and transported here for further eval

uation.  /162 in the field





- Related Data


Allergies/Adverse Reactions: 


                                        





hydrocodone [From Vicodin] Allergy (Verified 02/05/20 05:58)


   











Past Medical History





- General


Information source: Emergency Med Personnel





- Social History


Smoking Status: Unknown if Ever Smoked


Family History: Reviewed & Not Pertinent, Other - unable to determine at this 

time





- Past Medical History


Cardiac Medical History: Reports: Hx Hypertension


Neurological Medical History: Reports: Hx Seizures


Renal/ Medical History: Denies: Hx Peritoneal Dialysis


Psychiatric Medical History: 


   Denies: Hx Depression


Traumatic Medical History: Reports: Hx Traumatic Brain Injury





- Immunizations


Immunizations up to date: No


Hx Diphtheria, Pertussis, Tetanus Vaccination: Yes





Review of Systems





- Review of Systems


Constitutional: No symptoms reported


EENT: No symptoms reported


Cardiovascular: No symptoms reported


Respiratory: No symptoms reported


Gastrointestinal: No symptoms reported


Neurological/Psychological: See HPI, Seizure


-: Yes All other systems reviewed and negative





Physical Exam





- Vital signs


Vitals: 


                                        











Resp Pulse Ox


 


 26 H  94 


 


 03/01/20 11:43  03/01/20 11:43














- General


General appearance: Unresponsive, Other - intubated





- HEENT


Head: Normocephalic


Pupils: Pinpoint


Mouth/Lips: Normal


Pharynx: Normal





- Respiratory


Respiratory status: Other - pt. intubated -- bagged per RT


Breath sounds: Normal





- Cardiovascular


Rhythm: Regular, Tachycardia


Heart sounds: Normal auscultation


Murmur: No





- Abdominal


Inspection: Normal


Distension: No distension





Course





- Re-evaluation


Re-evalutation: 





03/01/20 13:43


Pt. remains intubated on propofol drip.  He has been given keppra, labetalol, 

and ativan with improvements in his HR and BP.  He has not had any tonic-clonic 

seizure activity in the ED.  I will call the intensivist for adimssion





- Vital Signs


Vital signs: 


                                        











Temp Pulse Resp BP Pulse Ox


 


 100.5 F H     16   148/114 H  100 


 


 03/01/20 13:35     03/01/20 13:35  03/01/20 13:35  03/01/20 13:35














- Laboratory


Result Diagrams: 


                                 03/01/20 11:07





                                 03/01/20 11:07


Laboratory results interpreted by me: 


                                        











  03/01/20 03/01/20 03/01/20





  11:07 12:01 12:30


 


WBC  13.4 H  


 


Seg Neuts % (Manual)  89 H  


 


Lymphocytes % (Manual)  4 L  


 


Abs Neuts (Manual)  11.9 H  


 


ABG pH   


 


ABG pO2   


 


ABG HCO3   


 


ABG Total CO2   


 


ABG O2 Saturation   


 


Lactic Acid    3.0 H


 


Urine Protein   100 H 


 


Urine Glucose (UA)   50 H 


 


Urine Ketones   TRACE H 


 


Urine Blood   MODERATE H 














  03/01/20





  13:07


 


WBC 


 


Seg Neuts % (Manual) 


 


Lymphocytes % (Manual) 


 


Abs Neuts (Manual) 


 


ABG pH  7.26 L


 


ABG pO2  166.4 H


 


ABG HCO3  15.8 L


 


ABG Total CO2  16.9 L


 


ABG O2 Saturation  98.9 H


 


Lactic Acid 


 


Urine Protein 


 


Urine Glucose (UA) 


 


Urine Ketones 


 


Urine Blood 














- Diagnostic Test


Radiology reviewed: Reports reviewed - CT-head- no acute change; CXR - ETT in 

good position. Neg PTX, PNA





- EKG Interpretation by Me


EKG shows normal: Sinus rhythm


Rate: Tachycardia - sinus tach without acute change





Critical Care Note





- Critical Care Note


Total time excluding time spent on procedures (mins): 30





Discharge





- Discharge


Clinical Impression: 


 Status epilepticus





Condition: Serious


Disposition: ADMITTED AS INPATIENT


Admitting Provider: Kaye (Intensivist)


Unit Admitted: ICU

## 2020-03-01 NOTE — RADIOLOGY REPORT (SQ)
EXAM DESCRIPTION:  CT HEAD WITHOUT



COMPLETED DATE/TIME:  3/1/2020 12:18 pm



REASON FOR STUDY:  seizure



COMPARISON:  2/5/2020.



TECHNIQUE:  Axial images acquired through the brain without intravenous contrast.  Images reviewed wi
th bone, brain and subdural windows.  Additional sagittal and coronal reconstructions were generated.
 Images stored on PACS.

All CT scanners at this facility use dose modulation, iterative reconstruction, and/or weight based d
osing when appropriate to reduce radiation dose to as low as reasonably achievable (ALARA).

[facility]: Dose Right  [facility]: CareDose    [facility]: Dose Right    [facility]: Teradose 4D    [facility]: Smart Technologies



RADIATION DOSE:  CT Rad equipment meets quality standard of care and radiation dose reduction techniq
ues were employed. CTDIvol: 53.2 mGy. DLP: 1044 mGy-cm. mGy.



LIMITATIONS:  None.



FINDINGS:  VENTRICLES: Normal size and contour.

CEREBRUM: No masses.  No hemorrhage.  No midline shift.  No evidence for acute infarction. Normal gra
y/white matter differentiation. No areas of low density in the white matter.

CEREBELLUM: No masses.  No hemorrhage.  No alteration of density.  No evidence for acute infarction.

EXTRAAXIAL SPACES: No fluid collections.  No masses.

ORBITS AND GLOBE: No intra- or extraconal masses.  Normal contour of globe without masses.

CALVARIUM: No fracture.

PARANASAL SINUSES: No fluid or mucosal thickening.

SOFT TISSUES: No mass or hematoma.

OTHER: No other significant finding.



IMPRESSION:  NORMAL BRAIN CT WITHOUT CONTRAST.

EVIDENCE OF ACUTE STROKE: NO.



COMMENT:  Quality ID # [id number]: Final reports with documentation of one or more dose reduction techniques
 (e.g., Automated exposure control, adjustment of the mA and/or kV according to patient size, use of 
iterative reconstruction technique)



TECHNICAL DOCUMENTATION:  JOB ID:  [id number]

 2011 [employer]- All Rights Reserved



Reading location - IP/workstation name: [email or web address]

## 2020-03-01 NOTE — CRITICAL CARE ADMISSION REPORT
HPI


Date:: 03/01/20


Time:: 11:30


Reason for ICU Reason:: Intubated after multiple seizures.


HPI: 





This patient is a 35 yo man with a known seizure disorder who is non-compliant 

with keppra and abuses narcotics and cocaine further lowering his seizure 

threshold. He had several seizures today and was intubated for airway protection

in the field and seizing on ED presentation. Seizures have stopped with IV 

keppra, ativan and propofol. He will be admitted to the ICU and extubated when 

awake.


History obtained from:: Old recorde and ED MD.





- Diagnosis/Plan


(1) Status epilepticus, generalized convulsive


Is this a current diagnosis for this admission?: Yes   


Plan: 


Continue IV keppra, PRN ativan and propofol.








(2) Cocaine abuse with intoxication


Is this a current diagnosis for this admission?: Yes   


Plan: 


Lowers seizure threshold, use PRN ativan.








(3) Encounter for weaning from ventilator


Is this a current diagnosis for this admission?: Yes   


Plan: 


No lung pathology, no obvious aspiration. Extubate when awake.








- .


Plan Summary: 





Extubate when awake.





Past Medical History


Cardiac Medical History: Reports: Hypertension


Neurological Medical History: Reports: Seizures


Psychiatric Medical History: 


   Denies: Depression


Traumatic Medical History: Reports: Traumatic Brain Injury





Social/Family History





- Social History


Smoking Status: Unknown if Ever Smoked


Frequency of Alcohol Use: Occasional - per medical record but reviewing medical 

record sounds like patient may drink regularly; needs clarification


Hx Recreational Drug Use: Yes


Drugs: Cocaine - positive on current admission toxicology


Hx Prescription Drug Abuse: No





- Medication/Allergies


Home Medications: 








No Home Medications  03/01/20 








Allergies/Adverse Reactions: 


                                        





hydrocodone [From Vicodin] Allergy (Verified 02/05/20 05:58)


   











Review of Systems


ROS unobtainable: Due to endotracheal tube, Due to mental status





Physical Exam


Vital Signs: 


                                        











Temp Pulse Resp BP Pulse Ox


 


 100.7 F H     14   149/115 H  100 


 


 03/01/20 13:50     03/01/20 13:50  03/01/20 13:50  03/01/20 13:50








                                 Intake & Output











 02/29/20 03/01/20 03/02/20





 06:59 06:59 06:59


 


Intake Total   100


 


Balance   100


 


Weight   71.6 kg








                                  Weight/Height





Weight                           71.6 kg


Height                           5 ft 11 in








General appearance: PRESENT: no acute distress


Head exam: PRESENT: atraumatic, normocephalic


Eye exam: PRESENT: conjunctiva pink, EOMI, PERRLA.  ABSENT: scleral icterus


Ear exam: PRESENT: normal external ear exam


Mouth exam: PRESENT: moist, tongue midline


Respiratory exam: PRESENT: clear to auscultation dana.  ABSENT: rales, rhonchi, 

wheezes


Cardiovascular exam: PRESENT: RRR.  ABSENT: diastolic murmur, rubs, systolic mur

mur


Vascular exam: PRESENT: normal capillary refill


GI/Abdominal exam: PRESENT: normal bowel sounds, soft.  ABSENT: distended, 

guarding, mass, organolmegaly, rebound, tenderness


Rectal exam: PRESENT: deferred


Extremities exam: PRESENT: full ROM.  ABSENT: calf tenderness, clubbing, pedal 

edema


Musculoskeletal exam: PRESENT: normal inspection


Neurological exam: PRESENT: other - Sedated


Skin exam: PRESENT: dry, intact, warm.  ABSENT: cyanosis, rash


Tubes/Lines: PRESENT: Endotracheal Tube





Laboratory/Radiographs


Laboratory Results: 


                                        





                                 03/01/20 11:07 





                                 03/01/20 12:30 





                                        











  03/01/20 03/01/20 03/01/20





  11:07 11:07 12:01


 


WBC  13.4 H  


 


RBC  4.95  


 


Hgb  15.4  


 


Hct  47.2  


 


MCV  95  


 


MCH  31.0  


 


MCHC  32.5  


 


RDW  13.7  


 


Plt Count  291  


 


Seg Neutrophils %  Not Reportable  


 


Carbonic Acid   


 


HCO3/H2CO3 Ratio   


 


ABG pH   


 


ABG pCO2   


 


ABG pO2   


 


ABG HCO3   


 


ABG O2 Saturation   


 


ABG Base Excess   


 


FiO2   


 


Sodium   Cancelled 


 


Potassium   Cancelled 


 


Chloride   Cancelled 


 


Carbon Dioxide   Cancelled 


 


Anion Gap   Cancelled 


 


BUN   Cancelled 


 


Creatinine   Cancelled 


 


Est GFR ( Amer)   Cancelled 


 


Est GFR (Non-Af Amer)   Cancelled 


 


Glucose   Cancelled 


 


Lactic Acid   


 


Calcium   Cancelled 


 


Magnesium   Cancelled 


 


Total Bilirubin   Cancelled 


 


AST   Cancelled 


 


Alkaline Phosphatase   Cancelled 


 


Total Protein   Cancelled 


 


Albumin   Cancelled 


 


Urine Color    STRAW


 


Urine Appearance    SLIGHTLY-CLOUDY


 


Urine pH    5.0


 


Ur Specific Gravity    1.015


 


Urine Protein    100 H


 


Urine Glucose (UA)    50 H


 


Urine Ketones    TRACE H


 


Urine Blood    MODERATE H


 


Urine Nitrite    NEGATIVE


 


Ur Leukocyte Esterase    NEGATIVE


 


Urine WBC (Auto)    1


 


Urine RBC (Auto)    0














  03/01/20 03/01/20 03/01/20





  12:30 12:30 13:07


 


WBC   


 


RBC   


 


Hgb   


 


Hct   


 


MCV   


 


MCH   


 


MCHC   


 


RDW   


 


Plt Count   


 


Seg Neutrophils %   


 


Carbonic Acid    1.09


 


HCO3/H2CO3 Ratio    14:1


 


ABG pH    7.26 L


 


ABG pCO2    36.3


 


ABG pO2    166.4 H


 


ABG HCO3    15.8 L


 


ABG O2 Saturation    98.9 H


 


ABG Base Excess    -10.4


 


FiO2    90%


 


Sodium   Cancelled 


 


Potassium   Cancelled 


 


Chloride   Cancelled 


 


Carbon Dioxide   Cancelled 


 


Anion Gap   Cancelled 


 


BUN   Cancelled 


 


Creatinine   Cancelled 


 


Est GFR ( Amer)   Cancelled 


 


Est GFR (Non-Af Amer)   Cancelled 


 


Glucose   Cancelled 


 


Lactic Acid  3.0 H  


 


Calcium   Cancelled 


 


Magnesium   Cancelled 


 


Total Bilirubin   Cancelled 


 


AST   Cancelled 


 


Alkaline Phosphatase   Cancelled 


 


Total Protein   Cancelled 


 


Albumin   Cancelled 


 


Urine Color   


 


Urine Appearance   


 


Urine pH   


 


Ur Specific Gravity   


 


Urine Protein   


 


Urine Glucose (UA)   


 


Urine Ketones   


 


Urine Blood   


 


Urine Nitrite   


 


Ur Leukocyte Esterase   


 


Urine WBC (Auto)   


 


Urine RBC (Auto)   











Impressions: 


                                        





Chest X-Ray  03/01/20 11:51


IMPRESSION:  ENDOTRACHEAL TUBE APPEARS IN SATISFACTORY POSITION.  ATELECTASIS IN

THE RIGHT LUNG BASE.


 








Head CT  03/01/20 11:51


IMPRESSION:  NORMAL BRAIN CT WITHOUT CONTRAST.


EVIDENCE OF ACUTE STROKE: NO.


 











EKG: 





NSR


All labs, radiographs, diagnostic studies and EKGs were personally reviewed: Yes


In addition, reports of radiographic and diagnostic studies were read: Yes





Critical Time


Critical Time (minutes): 40


-: 


The care of a critically ill patient is dynamic.  This note represents a static 

moment in the admission process. Orders and treatments may be given si

multaneously and urgently, and time is not representative of the treatment 

process.





This patient requires Critical Care secondary to life threatening organ or limb 

dysfunction.  Without Critical Care services, the patient is at risk for 

increased mortality and morbidity.

## 2020-03-01 NOTE — EKG REPORT
SEVERITY:- OTHERWISE NORMAL ECG -

SINUS TACHYCARDIA

ATRIAL PREMATURE COMPLEX

:

Confirmed by: Nathaly Wood MD 01-Mar-2020 16:18:28

## 2020-03-02 NOTE — PDOC CRITICAL CARE PROG REPORT
General


Date:: 03/02/20


ICU Day:: 2


Ventilator Day:: 2


Hospital Day:: 2


Resuscitation Status: Full Code


Events in the past 12 to 24 Hours:: 


Remains intubated.  Seizures controlled with Keppra and propofol.  Heavily 

sedated.  Postictal?  On propofol for sedation.  On nitroglycerin infusion.  

Blood cultures isolating gram-negative rods in the interim.  WBC rising, 14.2


Review of systems relevant to events:: 


Neurologic


Reason for ICU Addmission:: Intubated after multiple seizures.





- Medications:


Medications reviewed and adjusted accordingly: Yes


Vasopressors:: 


None


Sedation:: 


Propofol





Physical Exam


Vital Signs: 


                                        











Temp Pulse Resp BP Pulse Ox


 


 97.2 F   88   14   131/89 H  99 


 


 03/02/20 06:00  03/02/20 06:00  03/02/20 08:00  03/02/20 07:58  03/02/20 08:00








                                 Intake & Output











 03/01/20 03/02/20 03/03/20





 06:59 06:59 06:59


 


Intake Total  1975 61


 


Output Total  1148 80


 


Balance  827 -19


 


Weight  69.1 kg 








                                  Weight/Height





Weight                           69.1 kg


Height                           1.8 m








General appearance: PRESENT: no acute distress, well-developed, well-nourished


Eye exam: PRESENT: conjunctiva pink, EOMI, PERRLA.  ABSENT: scleral icterus


Mouth exam: PRESENT: moist, tongue midline, other - ET tube in situ


Neck exam: ABSENT: carotid bruit, JVD, lymphadenopathy, thyromegaly


Respiratory exam: PRESENT: clear to auscultation dana.  ABSENT: rales, rhonchi, 

wheezes


Cardiovascular exam: PRESENT: RRR.  ABSENT: diastolic murmur, rubs, systolic 

murmur


Pulses: PRESENT: normal dorsalis pedis pul


GI/Abdominal exam: PRESENT: normal bowel sounds, soft.  ABSENT: distended, 

guarding, mass, organolmegaly, rebound, tenderness


Extremities exam: ABSENT: calf tenderness, clubbing, pedal edema


Musculoskeletal exam: PRESENT: normal inspection - Facial symmetry.  Spontaneous

respirations intact.  Doll's eyes intact.  No Babinski.


Skin exam: PRESENT: dry, intact, warm.  ABSENT: cyanosis, rash


Tubes/Lines: PRESENT: Endotracheal Tube - Leon





Laboratory/Radiographs


Laboratory Results: 


                                        





                                 03/02/20 04:05 





                                 03/02/20 04:05 





                                        











  03/01/20 03/01/20 03/01/20





  11:07 11:07 12:01


 


WBC  13.4 H  


 


RBC  4.95  


 


Hgb  15.4  


 


Hct  47.2  


 


MCV  95  


 


MCH  31.0  


 


MCHC  32.5  


 


RDW  13.7  


 


Plt Count  291  


 


Seg Neutrophils %  Not Reportable  


 


Carbonic Acid   


 


HCO3/H2CO3 Ratio   


 


ABG pH   


 


ABG pCO2   


 


ABG pO2   


 


ABG HCO3   


 


ABG O2 Saturation   


 


ABG Base Excess   


 


FiO2   


 


Sodium   Cancelled 


 


Potassium   Cancelled 


 


Chloride   Cancelled 


 


Carbon Dioxide   Cancelled 


 


Anion Gap   Cancelled 


 


BUN   Cancelled 


 


Creatinine   Cancelled 


 


Est GFR ( Amer)   Cancelled 


 


Est GFR (Non-Af Amer)   Cancelled 


 


Glucose   Cancelled 


 


Lactic Acid   


 


Calcium   Cancelled 


 


Magnesium   Cancelled 


 


Total Bilirubin   Cancelled 


 


AST   Cancelled 


 


Alkaline Phosphatase   Cancelled 


 


Total Protein   Cancelled 


 


Albumin   Cancelled 


 


Urine Color    STRAW


 


Urine Appearance    SLIGHTLY-CLOUDY


 


Urine pH    5.0


 


Ur Specific Gravity    1.015


 


Urine Protein    100 H


 


Urine Glucose (UA)    50 H


 


Urine Ketones    TRACE H


 


Urine Blood    MODERATE H


 


Urine Nitrite    NEGATIVE


 


Ur Leukocyte Esterase    NEGATIVE


 


Urine WBC (Auto)    1


 


Urine RBC (Auto)    0














  03/01/20 03/01/20 03/01/20





  12:30 12:30 13:07


 


WBC   


 


RBC   


 


Hgb   


 


Hct   


 


MCV   


 


MCH   


 


MCHC   


 


RDW   


 


Plt Count   


 


Seg Neutrophils %   


 


Carbonic Acid    1.09


 


HCO3/H2CO3 Ratio    14:1


 


ABG pH    7.26 L


 


ABG pCO2    36.3


 


ABG pO2    166.4 H


 


ABG HCO3    15.8 L


 


ABG O2 Saturation    98.9 H


 


ABG Base Excess    -10.4


 


FiO2    90%


 


Sodium   Cancelled 


 


Potassium   Cancelled 


 


Chloride   Cancelled 


 


Carbon Dioxide   Cancelled 


 


Anion Gap   Cancelled 


 


BUN   Cancelled 


 


Creatinine   Cancelled 


 


Est GFR ( Amer)   Cancelled 


 


Est GFR (Non-Af Amer)   Cancelled 


 


Glucose   Cancelled 


 


Lactic Acid  3.0 H  


 


Calcium   Cancelled 


 


Magnesium   Cancelled 


 


Total Bilirubin   Cancelled 


 


AST   Cancelled 


 


Alkaline Phosphatase   Cancelled 


 


Total Protein   Cancelled 


 


Albumin   Cancelled 


 


Urine Color   


 


Urine Appearance   


 


Urine pH   


 


Ur Specific Gravity   


 


Urine Protein   


 


Urine Glucose (UA)   


 


Urine Ketones   


 


Urine Blood   


 


Urine Nitrite   


 


Ur Leukocyte Esterase   


 


Urine WBC (Auto)   


 


Urine RBC (Auto)   














  03/01/20 03/02/20 03/02/20





  13:45 04:05 04:05


 


WBC   14.4 H 


 


RBC   4.62 


 


Hgb   14.5 


 


Hct   42.7 


 


MCV   92 


 


MCH   31.5 


 


MCHC   34.1 


 


RDW   13.4 


 


Plt Count   216 


 


Seg Neutrophils %   71.7 


 


Carbonic Acid   


 


HCO3/H2CO3 Ratio   


 


ABG pH   


 


ABG pCO2   


 


ABG pO2   


 


ABG HCO3   


 


ABG O2 Saturation   


 


ABG Base Excess   


 


FiO2   


 


Sodium  140.8   143.3


 


Potassium  4.5   4.2


 


Chloride  109 H   115 H


 


Carbon Dioxide  19 L   20 L


 


Anion Gap  13   8


 


BUN  12   16


 


Creatinine  1.51 H   1.60 H


 


Est GFR ( Amer)  > 60   > 60


 


Est GFR (Non-Af Amer)   


 


Glucose  83   73 L


 


Lactic Acid   


 


Calcium  9.2   8.8


 


Magnesium  2.8 H  


 


Total Bilirubin  0.3   0.4


 


AST  28   29


 


Alkaline Phosphatase  115   86


 


Total Protein  8.4 H   7.0


 


Albumin  4.8   3.8


 


Urine Color   


 


Urine Appearance   


 


Urine pH   


 


Ur Specific Gravity   


 


Urine Protein   


 


Urine Glucose (UA)   


 


Urine Ketones   


 


Urine Blood   


 


Urine Nitrite   


 


Ur Leukocyte Esterase   


 


Urine WBC (Auto)   


 


Urine RBC (Auto)   











Impressions: 


                                        





Chest X-Ray  03/01/20 11:51


IMPRESSION:  ENDOTRACHEAL TUBE APPEARS IN SATISFACTORY POSITION.  ATELECTASIS IN

THE RIGHT LUNG BASE.


 








Head CT  03/01/20 11:51


IMPRESSION:  NORMAL BRAIN CT WITHOUT CONTRAST.


EVIDENCE OF ACUTE STROKE: NO.


 








KUB X-Ray  03/01/20 14:22


IMPRESSION:  Enteric tube projecting over the left upper abdomen over the region

of the stomach.


 











All labs, radiographs, diagnostic studies and EKGs were personally reviewed: Yes


In addition, reports of radiographic and diagnostic studies were read: Yes





Assessment and Plan





- Diagnosis


(1) Endotracheally intubated


Is this a current diagnosis for this admission?: Yes   


Plan: 


Sedation vacation.  Anticipate liberation from mechanical ventilatory support 

within the next 24 hours.








(2) Gram-negative bacteremia


Is this a current diagnosis for this admission?: Yes   


Plan: 


Trach aspirate for Gram stain, C/S.


Start Zosyn for gram-negative coverage and coverage for aspiration pneumonia.








(3) Acute kidney injury


Is this a current diagnosis for this admission?: Yes   





(4) Status epilepticus, generalized convulsive


Is this a current diagnosis for this admission?: Yes   


Plan: 


Controlled with Keppra.


No new history of nonadherence to prescribed therapy.








(5) Cocaine abuse with intoxication


Is this a current diagnosis for this admission?: Yes   





(6) Aspiration pneumonia


Qualifiers: 


   Aspiration pneumonia type: unspecified   Laterality: right   Lung location: 

lower lobe of lung   Qualified Code(s): J69.0 - Pneumonitis due to inhalation of

food and vomit   


Is this a current diagnosis for this admission?: Yes   


Plan: 


Start Zosyn.








Critical Time


Critical Time (minutes): 45


Level of Care: ICU


-: 


1.  The care of a critical patient is a dynamic process.  This note is a 

representative synopsis but static in nature.  The timeframe for treatments gi

chung in order is not necessarily the actual time these treatments may have been 

done.





2.  This patient requires critical care secondary to ongoing requirements for 

therapy not offered or safe outside the critical care environment.  Transfer to 

a lower level of care will result in altered life or limb morbidity and 

mortality.





3.  Multidisciplinary rounds completed.





4.  ABCDE bundle addressed.

## 2020-03-03 NOTE — PDOC CRITICAL CARE PROG REPORT
General


Date:: 03/03/20


ICU Day:: 3


Ventilator Day:: 3


Hospital Day:: 3


Resuscitation Status: Full Code


Events in the past 12 to 24 Hours:: 


Remains intubated.  Currently on sedation vacation. No seizures reported inthe 

interim. On PSV. Breathing comfortably. Opens eyes to voice but does NOT follow 

commands yet. Blood cultures isolating gram-negative rods in the interim.  

Started on Zosyn yesterday. Blood cultures siolated GNR and GPC/clusters (not 

MRSA). WBC rising, 14.4-->5.5


Review of systems relevant to events:: 


Neurologic


Reason for ICU Addmission:: Intubated after multiple seizures.





- Medications:


Medications reviewed and adjusted accordingly: Yes


Vasopressors:: 


None


Sedation:: 


Propofol





Physical Exam


Vital Signs: 


                                        











Temp Pulse Resp BP Pulse Ox


 


 99.3 F   87   14   127/77 H  100 


 


 03/03/20 05:12  03/03/20 07:56  03/03/20 07:56  03/03/20 05:16  03/03/20 07:56








                                 Intake & Output











 03/02/20 03/03/20 03/04/20





 06:59 06:59 06:59


 


Intake Total 1975 2195 


 


Output Total 1148 1310 


 


Balance 827 885 


 


Weight 69.1 kg 71.9 kg 








                                  Weight/Height





Weight                           71.9 kg


Height                           1.8 m








General appearance: PRESENT: no acute distress, well-developed, well-nourished


Head exam: PRESENT: atraumatic, normocephalic


Eye exam: PRESENT: conjunctiva pink, EOMI, PERRLA.  ABSENT: scleral icterus


Mouth exam: PRESENT: moist, tongue midline, other - ET tube in situ


Neck exam: ABSENT: carotid bruit, JVD, lymphadenopathy, thyromegaly


Respiratory exam: PRESENT: clear to auscultation dana.  ABSENT: rales, rhonchi, 

wheezes


Cardiovascular exam: PRESENT: RRR.  ABSENT: diastolic murmur, rubs, systolic 

murmur


Pulses: PRESENT: normal dorsalis pedis pul


Extremities exam: PRESENT: full ROM.  ABSENT: calf tenderness, clubbing, pedal 

edema


Musculoskeletal exam: PRESENT: normal inspection


Neurological exam: PRESENT: awake, reflexes normal, CN II-XII grossly intact.  

ABSENT: motor sensory deficit


Tubes/Lines: PRESENT: Endotracheal Tube, Other - Leon; orogastric tube.





Laboratory/Radiographs


Laboratory Results: 


                                        





                                 03/03/20 03:55 





                                 03/03/20 05:39 





                                        











  03/02/20 03/03/20 03/03/20





  20:05 03:55 03:55


 


WBC   5.5 


 


RBC   4.16 L 


 


Hgb   12.7 L 


 


Hct   38.4 


 


MCV   92 


 


MCH   30.6 


 


MCHC   33.2 


 


RDW   13.5 


 


Plt Count   200 


 


Seg Neutrophils %   58.0 


 


Carbonic Acid  1.20  


 


HCO3/H2CO3 Ratio  16:1  


 


ABG pH  7.32 L  


 


ABG pCO2  40.0  


 


ABG pO2  121.5 H  


 


ABG HCO3  20.1  


 


ABG O2 Saturation  98.1 H  


 


ABG Base Excess  -5.6  


 


FiO2  30%  


 


Sodium    Cancelled


 


Potassium    Cancelled


 


Chloride    Cancelled


 


Carbon Dioxide    Cancelled


 


Anion Gap    Cancelled


 


BUN    Cancelled


 


Creatinine    Cancelled


 


Est GFR ( Amer)    Cancelled


 


Est GFR (Non-Af Amer)    Cancelled


 


Glucose    Cancelled


 


Calcium    Cancelled


 


Magnesium    Cancelled














  03/03/20





  05:39


 


WBC 


 


RBC 


 


Hgb 


 


Hct 


 


MCV 


 


MCH 


 


MCHC 


 


RDW 


 


Plt Count 


 


Seg Neutrophils % 


 


Carbonic Acid 


 


HCO3/H2CO3 Ratio 


 


ABG pH 


 


ABG pCO2 


 


ABG pO2 


 


ABG HCO3 


 


ABG O2 Saturation 


 


ABG Base Excess 


 


FiO2 


 


Sodium  141.5


 


Potassium  3.8


 


Chloride  114 H


 


Carbon Dioxide  18 L


 


Anion Gap  10


 


BUN  12


 


Creatinine  1.23


 


Est GFR ( Amer)  > 60


 


Est GFR (Non-Af Amer) 


 


Glucose  115 H


 


Calcium  8.4


 


Magnesium  2.2








                                        





03/01/20 12:30   Blood   Blood Culture (PCR) - Final


                            Staphylococcus Species








Impressions: 


                                        





Head CT  03/01/20 11:51


IMPRESSION:  NORMAL BRAIN CT WITHOUT CONTRAST.


EVIDENCE OF ACUTE STROKE: NO.


 








KUB X-Ray  03/01/20 14:22


IMPRESSION:  Enteric tube projecting over the left upper abdomen over the region

of the stomach.


 











All labs, radiographs, diagnostic studies and EKGs were personally reviewed: Yes


In addition, reports of radiographic and diagnostic studies were read: Yes





Assessment and Plan





- Diagnosis


(1) Endotracheally intubated


Is this a current diagnosis for this admission?: Yes   


Plan: 


Sedation vacation.  Continue pressure support trial.


Check ABG.


Anticipate liberation from mechanical ventilatory support today.








(2) Gram-negative bacteremia


Is this a current diagnosis for this admission?: Yes   


Plan: 


Continue Zosyn for gram-negative coverage and coverage for aspiration pneumonia.








(3) Acute kidney injury


Is this a current diagnosis for this admission?: Yes   





(4) Status epilepticus, generalized convulsive


Is this a current diagnosis for this admission?: Yes   


Plan: 


Controlled with Keppra.


Known history of nonadherence to prescribed therapy.








(5) Cocaine abuse with intoxication


Is this a current diagnosis for this admission?: Yes   





(6) Aspiration pneumonia


Qualifiers: 


   Aspiration pneumonia type: unspecified   Laterality: right   Lung location: 

lower lobe of lung   Qualified Code(s): J69.0 - Pneumonitis due to inhalation of

food and vomit   


Is this a current diagnosis for this admission?: Yes   





Critical Time


Critical Time (minutes): 60


Level of Care: ICU


-: 


1.  The care of a critical patient is a dynamic process.  This note is a 

representative synopsis but static in nature.  The timeframe for treatments 

given in order is not necessarily the actual time these treatments may have been

done.





2.  This patient requires critical care secondary to ongoing requirements for 

therapy not offered or safe outside the critical care environment.  Transfer to 

a lower level of care will result in altered life or limb morbidity and 

mortality.





3.  Multidisciplinary rounds completed.





4.  ABCDE bundle addressed.

## 2020-03-03 NOTE — RADIOLOGY REPORT (SQ)
EXAM DESCRIPTION:  CHEST SINGLE VIEW



COMPLETED DATE/TIME:  3/3/2020 5:41 am



REASON FOR STUDY:  aspiration pneumonia



COMPARISON:  3/1/2020.



EXAM PARAMETERS:  NUMBER OF VIEWS: One view.

TECHNIQUE: Single frontal radiographic view of the chest acquired.

RADIATION DOSE: NA

LIMITATIONS: None.



FINDINGS:  LUNGS AND PLEURA: Improved aeration.  No opacities, masses or pneumothorax. No pleural eff
usion.

MEDIASTINUM AND HILAR STRUCTURES: No masses.  Contour normal.

HEART AND VASCULAR STRUCTURES: Heart normal in size.  Normal vasculature.

BONES: No acute findings.

HARDWARE: Endotracheal tube and nasogastric tube.

OTHER: No other significant finding.



IMPRESSION:  IMPROVED AERATION.  NO ACUTE RADIOGRAPHIC FINDING IN THE CHEST.



TECHNICAL DOCUMENTATION:  JOB ID:  [id number]

 2011 [employer]- All Rights Reserved



Reading location - IP/workstation name: [email or web address]

## 2020-03-04 NOTE — PDOC CRITICAL CARE PROG REPORT
General


Date:: 03/04/20


ICU Day:: 4


Hospital Day:: 4


Resuscitation Status: Full Code


Events in the past 12 to 24 Hours:: 


Successfully extubated in the interim.  No seizures reported in the interim.  

Perseverating on wanting to go home.  Reports that he knows he is supposed to 

take medications to control seizures.  Unclear on his level of adherence.  He is

aware that he is in the hospital; however, he has no idea that it is because he 

has had seizure activity.  On Zosyn for blood cultures that isolated GNR and 

GPC/clusters (not MRSA).  WBC count has normalized while on Zosyn.  Of note 

trach aspirate obtained on 3/2/2020 is isolating gram-positive cocci in clusters

(original Gram stain was polymicrobial: GPC/pairs and clusters, GPRs, GNR, 4+ 

PMNs).  Blood culture results are compatible with coagulase-negative 

Staphylococcus.  Final identification from blood culture and respiratory culture

still pending.


Review of systems relevant to events:: 


Neurologic, respiratory


Reason for ICU Addmission:: Intubated after multiple seizures.





- Medications:


Medications reviewed and adjusted accordingly: Yes


Vasopressors:: 


None


Sedation:: 


None





Physical Exam


Vital Signs: 


                                        











Temp Pulse Resp BP Pulse Ox


 


 99.3 F   89   14   158/112 H  99 


 


 03/04/20 06:00  03/04/20 06:00  03/04/20 06:00  03/04/20 06:00  03/04/20 06:00








                                 Intake & Output











 03/03/20 03/04/20 03/05/20





 06:59 06:59 06:59


 


Intake Total 2195 4346 


 


Output Total 1310 2285 


 


Balance 885 2061 


 


Weight 71.9 kg 76.7 kg 








                                  Weight/Height





Weight                           76.7 kg


Height                           1.8 m








General appearance: PRESENT: no acute distress, well-developed, well-nourished


Head exam: PRESENT: atraumatic, normocephalic


Eye exam: PRESENT: conjunctiva pink, EOMI, PERRLA.  ABSENT: scleral icterus


Mouth exam: PRESENT: moist, tongue midline


Neck exam: ABSENT: carotid bruit, JVD, lymphadenopathy, thyromegaly


Respiratory exam: PRESENT: clear to auscultation dana.  ABSENT: rales, rhonchi, 

wheezes


Cardiovascular exam: PRESENT: RRR.  ABSENT: diastolic murmur, rubs, systolic 

murmur


Pulses: PRESENT: normal dorsalis pedis pul


GI/Abdominal exam: PRESENT: normal bowel sounds, soft.  ABSENT: distended, 

guarding, mass, organolmegaly, rebound, tenderness


Extremities exam: PRESENT: full ROM.  ABSENT: calf tenderness, clubbing, pedal 

edema


Musculoskeletal exam: PRESENT: normal inspection


Neurological exam: PRESENT: alert, awake, oriented to person, oriented to place,

CN II-XII grossly intact.  ABSENT: oriented to time, oriented to situation, 

motor sensory deficit





Laboratory/Radiographs


Laboratory Results: 


                                        





                                 03/04/20 03:53 





                                 03/04/20 03:53 





                                        











  03/03/20 03/04/20 03/04/20





  08:00 03:53 03:53


 


WBC   4.1 


 


RBC   3.78 L 


 


Hgb   11.9 L 


 


Hct   34.4 L 


 


MCV   91 


 


MCH   31.4 


 


MCHC   34.4 


 


RDW   12.9 


 


Plt Count   203 


 


Seg Neutrophils %   50.6 


 


Carbonic Acid  1.16  


 


HCO3/H2CO3 Ratio  18:1  


 


ABG pH  7.36  


 


ABG pCO2  38.6  


 


ABG pO2  132.8 H  


 


ABG HCO3  21.3  


 


ABG O2 Saturation  98.6 H  


 


ABG Base Excess  -3.8  


 


FiO2  30%  


 


Sodium    139.6


 


Potassium    3.8


 


Chloride    112 H


 


Carbon Dioxide    21 L


 


Anion Gap    7


 


BUN    6 L


 


Creatinine    0.94


 


Est GFR ( Amer)    > 60


 


Glucose    73 L


 


Calcium    8.4


 


Magnesium    1.9


 


Total Bilirubin    0.9


 


AST    14 L


 


Alkaline Phosphatase    59


 


Total Protein    5.8 L


 


Albumin    3.0 L








                                        





03/01/20 12:30   Blood   Blood Culture (PCR) - Final


                            Staphylococcus Species








Impressions: 


                                        





Head CT  03/01/20 11:51


IMPRESSION:  NORMAL BRAIN CT WITHOUT CONTRAST.


EVIDENCE OF ACUTE STROKE: NO.


 








KUB X-Ray  03/01/20 14:22


IMPRESSION:  Enteric tube projecting over the left upper abdomen over the region

of the stomach.


 








Chest X-Ray  03/03/20 05:00


IMPRESSION:  IMPROVED AERATION.  NO ACUTE RADIOGRAPHIC FINDING IN THE CHEST.


 











All labs, radiographs, diagnostic studies and EKGs were personally reviewed: Yes


In addition, reports of radiographic and diagnostic studies were read: Yes





Assessment and Plan





- Diagnosis


(1) Endotracheally intubated


Is this a current diagnosis for this admission?: Yes   


Plan: 


Successfully extubated 3/3/2020








(2) Gram-negative bacteremia


Is this a current diagnosis for this admission?: Yes   


Plan: 


Continue Zosyn for gram-negative coverage and coverage for aspiration pneumonia 

until final bacterial identification completed.  He may be able to change to 

Augmentin p.o.  He will need a total of 7 days of coverage.








(3) Acute kidney injury


Is this a current diagnosis for this admission?: Yes   


Plan: 


Resolved








(4) Status epilepticus, generalized convulsive


Is this a current diagnosis for this admission?: Yes   


Plan: 


Controlled with Keppra.


Known history of nonadherence to prescribed therapy.








(5) Cocaine abuse with intoxication


Is this a current diagnosis for this admission?: Yes   





(6) Aspiration pneumonia


Qualifiers: 


   Aspiration pneumonia type: unspecified   Laterality: right   Lung location: 

lower lobe of lung   Qualified Code(s): J69.0 - Pneumonitis due to inhalation of

food and vomit   


Is this a current diagnosis for this admission?: Yes   


Plan: 


Continue Zosyn for now





Plan Summary: 


DISPOSITION: This patient is OK for transfer to the floor.





Critical Time


Critical Time (minutes): 45


Level of Care: ICU


-: 


1.  The care of a critical patient is a dynamic process.  This note is a 

representative synopsis but static in nature.  The timeframe for treatments 

given in order is not necessarily the actual time these treatments may have been

done.





2.  This patient requires critical care secondary to ongoing requirements for 

therapy not offered or safe outside the critical care environment.  Transfer to 

a lower level of care will result in altered life or limb morbidity and 

mortality.





3.  Multidisciplinary rounds completed.





4.  ABCDE bundle addressed.

## 2020-03-05 NOTE — PDOC PROGRESS REPORT
Subjective


Progress Note for:: 03/05/20


Reason For Visit: 


STATUS EPILLEPTICUS, INTUBATED


3/5/2020


Patient admitted for noncompliance, drug abuse, seizure disorder





Physical Exam


Vital Signs: 


                                        











Temp Pulse Resp BP Pulse Ox


 


 98.0 F   95   18   152/100 H  98 


 


 03/05/20 10:08  03/05/20 10:08  03/05/20 10:08  03/05/20 10:08  03/05/20 10:08








                                 Intake & Output











 03/04/20 03/05/20 03/06/20





 06:59 06:59 06:59


 


Intake Total 4346 1120 


 


Output Total 2285 1050 


 


Balance 2061 70 


 


Weight 76.7 kg 75.1 kg 











General appearance: PRESENT: mild distress, other - Patient is slow to respond, 

makes eye contact with staring


Respiratory exam: PRESENT: clear to auscultation dana.  ABSENT: rales, rhonchi, 

wheezes


Cardiovascular exam: PRESENT: RRR.  ABSENT: diastolic murmur, rubs, systolic 

murmur


Neurological exam: PRESENT: altered, other - Patient will not answer questions


Psychiatric exam: PRESENT: unusual affect - Patient's demeanor is very 

aggressive and threatening





Results


Laboratory Results: 


                                        





                                 03/04/20 03:53 





                                 03/04/20 03:53 





                                        





03/01/20 12:30   Blood   Blood Culture (PCR) - Final


                            Staphylococcus Species








Impressions: 


                                        





Head CT  03/01/20 11:51


IMPRESSION:  NORMAL BRAIN CT WITHOUT CONTRAST.


EVIDENCE OF ACUTE STROKE: NO.


 








KUB X-Ray  03/01/20 14:22


IMPRESSION:  Enteric tube projecting over the left upper abdomen over the region

of the stomach.


 








Chest X-Ray  03/03/20 05:00


IMPRESSION:  IMPROVED AERATION.  NO ACUTE RADIOGRAPHIC FINDING IN THE CHEST.


 














Assessment and Plan





- Diagnosis


(1) Altered mental status


Qualifiers: 


   Altered mental status type: disorientation   Qualified Code(s): R41.0 - 

Disorientation, unspecified   


Is this a current diagnosis for this admission?: Yes   





(2) Cocaine abuse with intoxication


Is this a current diagnosis for this admission?: Yes   





(3) Encephalopathy acute


Is this a current diagnosis for this admission?: Yes   





(4) Rhabdomyolysis


Qualifiers: 


   Rhabdomyolysis type: non-traumatic   Qualified Code(s): M62.82 - 

Rhabdomyolysis   


Is this a current diagnosis for this admission?: Yes   





(5) Seizure


Is this a current diagnosis for this admission?: Yes   





(6) Bacteremia


Is this a current diagnosis for this admission?: Yes   





(7) Aspiration pneumonia


Qualifiers: 


   Aspiration pneumonia type: unspecified   Laterality: right   Lung location: 

lower lobe of lung   Qualified Code(s): J69.0 - Pneumonitis due to inhalation of

food and vomit   


Is this a current diagnosis for this admission?: Yes   


Plan: 


Continue Zosyn for now








- Plan Summary


Summary: 


3/5/2020





Patient was transferred out of the ICU yesterday to the floor


This morning temperature is 98 pulse is 95 blood pressure 152/100


White count is still normal 4.1, H&H is stable


Chemistry panel is normal


Glucose normal





Keppra level is pending


Patient's affect today is very flat and aggressive appearing.  Patient is 

staring and will not answer questions.


Had to have Ativan last night and Geodon this morning, he was attempting to get 

out of bed attempting to leave the room and would not listen to the nurses.  

Security needed to be called


Keppra dosage is thousand milligrams every 12 hours


Is also currently on IV Zosyn every 6 hours


Other medications include Prinivil 40 mg daily and Procardia XL 30 mg daily





- Time


Time Spent with patient: 35 or more minutes

## 2020-03-06 NOTE — PDOC PROGRESS REPORT
Subjective


Progress Note for:: 03/06/20


Reason For Visit: 


STATUS EPILLEPTICUS


3/6/2020


Patient was admitted for substance abuse, seizure disorder, patient 

noncompliance





Physical Exam


Vital Signs: 


                                        











Temp Pulse Resp BP Pulse Ox


 


 97.7 F   88   18   139/99 H  97 


 


 03/06/20 07:31  03/06/20 08:41  03/06/20 08:41  03/06/20 07:31  03/06/20 08:41








                                 Intake & Output











 03/05/20 03/06/20 03/07/20





 06:59 06:59 06:59


 


Intake Total 1120 1217 100


 


Output Total 1050 450 


 


Balance 70 767 100


 


Weight 75.1 kg 77.3 kg 











General appearance: PRESENT: disheveled, thin, other - Patient having episodes 

of psychotic behavior


Respiratory exam: PRESENT: clear to auscultation dana.  ABSENT: rales, rhonchi, 

wheezes


Cardiovascular exam: PRESENT: RRR.  ABSENT: diastolic murmur, rubs, systolic 

murmur


Neurological exam: PRESENT: awake


Psychiatric exam: PRESENT: agitated, unusual affect





Results


Laboratory Results: 


                                        





                                 03/04/20 03:53 





                                 03/04/20 03:53 





                                        





03/01/20 12:30   Blood   Blood Culture (PCR) - Final


                            Staphylococcus Species


03/01/20 12:30   Blood   Blood Culture - Final


                            Acinetobacter Species


                            Staphylococcus Epidermidis


03/02/20 09:00   Tracheal Aspirate   Gram Stain - Final


03/02/20 09:00   Tracheal Aspirate   Sputum Culture - Final


                            Staphylococcus Aureus


                            Haemophilus Influenzae


                            Normal Kizzy








Impressions: 


                                        





Head CT  03/01/20 11:51


IMPRESSION:  NORMAL BRAIN CT WITHOUT CONTRAST.


EVIDENCE OF ACUTE STROKE: NO.


 








KUB X-Ray  03/01/20 14:22


IMPRESSION:  Enteric tube projecting over the left upper abdomen over the region

of the stomach.


 








Chest X-Ray  03/03/20 05:00


IMPRESSION:  IMPROVED AERATION.  NO ACUTE RADIOGRAPHIC FINDING IN THE CHEST.


 














Assessment and Plan





- Diagnosis


(1) Altered mental status


Qualifiers: 


   Altered mental status type: disorientation   Qualified Code(s): R41.0 - 

Disorientation, unspecified   


Is this a current diagnosis for this admission?: Yes   





(2) Cocaine abuse with intoxication


Is this a current diagnosis for this admission?: Yes   





(3) Encephalopathy acute


Is this a current diagnosis for this admission?: Yes   





(4) Rhabdomyolysis


Qualifiers: 


   Rhabdomyolysis type: non-traumatic   Qualified Code(s): M62.82 - 

Rhabdomyolysis   


Is this a current diagnosis for this admission?: Yes   





(5) Seizure


Is this a current diagnosis for this admission?: Yes   





(6) Bacteremia


Is this a current diagnosis for this admission?: Yes   





(7) Aspiration pneumonia


Qualifiers: 


   Aspiration pneumonia type: unspecified   Laterality: right   Lung location: 

lower lobe of lung   Qualified Code(s): J69.0 - Pneumonitis due to inhalation of

food and vomit   


Is this a current diagnosis for this admission?: Yes   





- Plan Summary


Summary: 


3/5/2020





Patient was transferred out of the ICU yesterday to the floor


This morning temperature is 98 pulse is 95 blood pressure 152/100


White count is still normal 4.1, H&H is stable


Chemistry panel is normal


Glucose normal





Keppra level is pending


Patient's affect today is very flat and aggressive appearing.  Patient is 

staring and will not answer questions.


Had to have Ativan last night and Geodon this morning, he was attempting to get 

out of bed attempting to leave the room and would not listen to the nurses.  

Security needed to be called


Keppra dosage is thousand milligrams every 12 hours


Is also currently on IV Zosyn every 6 hours


Other medications include Prinivil 40 mg daily and Procardia XL 30 mg daily





3/6/2020





Patient's blood pressures are being difficult to control because he is refusing 

to let the nurse give him IV medications as and refusing to take p.o.'s as well.




When patient was admitted to the hospital his pressure was in triple digits up 

to as high as 265/167


Even with refusing medications his blood pressures are averaging around 150/105





Patient is becoming more psychotic withdrawn refusing to cooperate, not 

answering questions, not in touch with reality.  





In the last 15 minutes patient had eloped from his room in the hospital had 

wandered out into the front of the hospital and was walking in for lanes of 

traffic with his hospital gown on.  Nursing staff as well as security and myself

convinced the patient to come back into his room at which time he was given 

Haldol 5 mg IV and put in soft restraints.  Patient was also given IV 

antihypertensive meds





About 30 minutes prior I put in a consult for psychiatry to see the patient 

because of his bizarre behavior.  This called them as well and notified them 

that this event had just occurred and that his consult was more of an urgent 

basis now because of his ability to understand what is in his best interest.





I am going to put in stat labs just to make sure that there is no significant 

metabolic abnormalities, ever I doubt this to be the case.


 





- Time


Time Spent with patient: 35 or more minutes

## 2020-03-06 NOTE — PSYCHOLOGICAL NOTE
Psych Note





- Psych Note


Date seen by psych provider: 03/06/20


Time seen by psych provider: 14:20 - Attempted


Psych Note: 





Reason for Consult: Bizarre Behavior





There is reported concern the patient has been demonstrating bizarre behavior. 

Patient reportedly has been becoming more psychotic withdrawn refusing to 

cooperate, not answering questions, and not in touch with reality.  Patient has 

a history of substance abuse and possible mental health.  Consult was requested 

after the patient had eloped from his room in the hospital had wandered out into

the front of the hospital and was walking in 4 lanes of traffic with his 

hospital gown on.  Nursing staff as well as security and attending physician 

convinced the patient to come back into his room at which time he was given 

Haldol 5 mg IV and put in soft restraints.  





Patient is current unable to engage in evaluation.  A 24 hour petition for 

evaluation has been filled out due to concerns for the patient's behaviours.  He

is in need a psychiatric evaluation.

## 2020-03-07 NOTE — PDOC PROGRESS REPORT
Subjective


Progress Note for:: 03/07/20


Reason For Visit: 


STATUS EPILLEPTICUS


3/7/2020


Patient was originally admitted for substance abuse, seizure disorder, patient 

noncompliance





Physical Exam


Vital Signs: 


                                        











Temp Pulse Resp BP Pulse Ox


 


 98.2 F   79   18   180/120 H  99 


 


 03/07/20 12:00  03/07/20 12:00  03/07/20 12:00  03/07/20 12:00  03/07/20 12:00








                                 Intake & Output











 03/06/20 03/07/20 03/08/20





 06:59 06:59 07:59


 


Intake Total 1217 840 


 


Output Total 450 490 


 


Balance 767 350 


 


Weight 77.3 kg 72.8 kg 











General appearance: PRESENT: no acute distress, other - This morning 

psychologically patient appears to be a completely different person, pleasant 

and cooperative


Respiratory exam: PRESENT: clear to auscultation dana.  ABSENT: rales, rhonchi, 

wheezes


Cardiovascular exam: PRESENT: RRR.  ABSENT: diastolic murmur, rubs, systolic 

murmur


Neurological exam: PRESENT: alert, awake, oriented to person, oriented to place,

oriented to time, oriented to situation, CN II-XII grossly intact.  ABSENT: 

motor sensory deficit


Psychiatric exam: PRESENT: manic, unusual affect, other - Behavior today is 

almost manic whereas yesterday was very hostile and depressed





Results


Laboratory Results: 


                                        





                                 03/04/20 03:53 





                                 03/04/20 03:53 





                                        





03/01/20 13:45   Blood   Blood Culture - Final


                            NO GROWTH IN 5 DAYS








Impressions: 


                                        





Head CT  03/01/20 11:51


IMPRESSION:  NORMAL BRAIN CT WITHOUT CONTRAST.


EVIDENCE OF ACUTE STROKE: NO.


 








KUB X-Ray  03/01/20 14:22


IMPRESSION:  Enteric tube projecting over the left upper abdomen over the region

of the stomach.


 








Chest X-Ray  03/03/20 05:00


IMPRESSION:  IMPROVED AERATION.  NO ACUTE RADIOGRAPHIC FINDING IN THE CHEST.


 














Assessment and Plan





- Diagnosis


(1) Altered mental status


Qualifiers: 


   Altered mental status type: disorientation   Qualified Code(s): R41.0 - 

Disorientation, unspecified   


Is this a current diagnosis for this admission?: Yes   





(2) Cocaine abuse with intoxication


Is this a current diagnosis for this admission?: Yes   





(3) Encephalopathy acute


Is this a current diagnosis for this admission?: Yes   





(4) Rhabdomyolysis


Qualifiers: 


   Rhabdomyolysis type: non-traumatic   Qualified Code(s): M62.82 - 

Rhabdomyolysis   


Is this a current diagnosis for this admission?: Yes   





(5) Seizure


Is this a current diagnosis for this admission?: Yes   





(6) Bacteremia


Is this a current diagnosis for this admission?: Yes   





(7) Aspiration pneumonia


Qualifiers: 


   Aspiration pneumonia type: unspecified   Laterality: right   Lung location: 

lower lobe of lung   Qualified Code(s): J69.0 - Pneumonitis due to inhalation of

food and vomit   


Is this a current diagnosis for this admission?: Yes   





- Plan Summary


Summary: 


3/5/2020





Patient was transferred out of the ICU yesterday to the floor


This morning temperature is 98 pulse is 95 blood pressure 152/100


White count is still normal 4.1, H&H is stable


Chemistry panel is normal


Glucose normal





Keppra level is pending


Patient's affect today is very flat and aggressive appearing.  Patient is 

staring and will not answer questions.


Had to have Ativan last night and Geodon this morning, he was attempting to get 

out of bed attempting to leave the room and would not listen to the nurses.  

Security needed to be called


Keppra dosage is thousand milligrams every 12 hours


Is also currently on IV Zosyn every 6 hours


Other medications include Prinivil 40 mg daily and Procardia XL 30 mg daily





3/6/2020





Patient's blood pressures are being difficult to control because he is refusing 

to let the nurse give him IV medications as and refusing to take p.o.'s as well.




When patient was admitted to the hospital his pressure was in triple digits up 

to as high as 265/167


Even with refusing medications his blood pressures are averaging around 150/105





Patient is becoming more psychotic withdrawn refusing to cooperate, not 

answering questions, not in touch with reality.  





In the last 15 minutes patient had eloped from his room in the hospital had 

wandered out into the front of the hospital and was walking in for lanes of 

traffic with his hospital gown on.  Nursing staff as well as security and myself

convinced the patient to come back into his room at which time he was given 

Haldol 5 mg IV and put in soft restraints.  Patient was also given IV 

antihypertensive meds





About 30 minutes prior I put in a consult for psychiatry to see the patient 

because of his bizarre behavior.  This called them as well and notified them 

that this event had just occurred and that his consult was more of an urgent 

basis now because of his ability to understand what is in his best interest.





I am going to put in stat labs just to make sure that there is no significant 

metabolic abnormalities, ever I doubt this to be the case.





3/7/2020


Patient was seen by psychiatry yesterday unfortunately he was under the effects 

of his Haldol and lethargic


They have returned early this morning and documentation is currently pending


Patient is cooperative today and asking to go home


I have spoken to his wife on the phone and she really does not want to take him 

home, said that she is unaware of where he was getting his illegal substances 

from, that he is very uncooperative at home





Patient's blood pressures are all over the chart from a low of 126/97 to a high 

of 180/120.  A lot of this has to do with patient refusing his p.o. medications 

and is having to try to give them to him IV.  Also it is based upon his 

agitation level


Keppra level is still pending


Patient sputum is growing out staph aureus and H flu.  Blood culture also 

growing out staph.  Both of these organisms appear to be sensitive to 

cephalosporins as well as the fluoroquinolones, as well as sulfa





Patient is currently on Zosyn but he has lost his IV site therefore I am going 

to order Keflex 500 mg 3 times daily, with a stop date of March 10











 





- Time


Time Spent with patient: 25-34 minutes

## 2020-03-07 NOTE — PSYCHOLOGICAL NOTE
Psych Note





- Psych Note


Date seen by psych provider: 03/07/20


Time seen by psych provider: 08:44 - 1766


Psych Note: 





Reason For Consult: Bizarre Behavior


Consent Permissions: Patient's wife, Mercedes





Patient has a history of substance abuse and possible mental health.  Consult 

was requested after the patient had eloped from his room in the hospital had 

wandered out into the front of the hospital and was walking in 4 lanes of 

traffic with his hospital gown on (clinician notes patient only had one gown on 

and his backside was exposed to the weather and public). 





Patient reports he remembers everything that occurred yesterday and stated that 

he ran from the hospital because he was "scared."  He denies that he saw 

anything specific only thought that he would get hurt if he stayed at the 

hospital.  Patient reports that he would like to go home to his family today.  

He continued to disclose that he promises he will not leave again like 

yesterday.  When clinician discussed the concerns of the patient ran out into 

traffic with only 1 gown on, which resulted in exposing his entire backside, 

patient laughed while hiding his face. He stated he did not even think about 

that when he walked out.  Patient talked with clinician about his drug use and 

states he will not use cocaine anymore.  Patient reports he has tried to stop 

before but ends up using again "because I wanted to party." Clinician provided 

psychoeducation on the medical concerns with his cocaine use; ie lowers seizure 

thresh hold.  Patient denies mental health history.  When asked if he 

experiences any times where he sees or hears things that confuse or scare him, 

he stated "real dogs."  He states his neighbor has a dog that has a growl that 

scares him.  Patient reports he stopped taking his Keppra because he did not 

have the money to  his prescriptions. 





Behavioral health team contacted [facility]; see mental health note.  Patient does

not have any history of mental health diagnosis.





Patient is alert and orientated to person, place, time and circumstance.  Mood 

is euthymic with congruent affect as evidenced by laughing, smiling and openly 

engaging with clinician.  Patient denies suicidal and homicidal ideation.  

Delusions are absent and behaviors congruent with an intact reality based 

presentation ie organized and linear thought process.  Eye contact is well-

maintained.  Conversational speech is noted to have delays in responses and 

slower speech pattern.  Intellectual abilities appear to be within the low 

average range (appears to have very concrete thought processes which could 

indicate lower intellectual abilities, neurodevelopmental disorders, TBI, 

neurodegenerative processes ect.).  Attention and concentration are good.  

Insight, judgment, impulse control are fair.





Medication recommendations per [facility]'s contracted psychiatrist Dr. Teresa BRYANT 

are as follows


Haldol 5 mg IM every 6 hours as needed 


Cogentin 1 mg daily








Impression\plan:Patient is recommended for IVC.  Patient is a substance abuser. 

 While there is not a history of mental health, long term substance abuse can 

result in mental health diagnosis (ie. substance induced depression, psychosis, 

mood dysregulation etc). Patient's disclosure of being "scared" yesterday and 

today reporting "real dogs" when asked about hallucinations, supports the 

probability the patient his experiencing some form(s) of hallucinations.  

Patient has demonstrated behavior that is grossly irrational and inappropriate 

for the situation (ie walking out of the hospital and into traffic in only a 

hospital gown which resulted in stopping traffic and the patient's entire 

backside being exposed to the weather and public, when discussing this event 

patient laughed).  At this time the patient is a danger to himself."  Medication

recommendations have been provided.  Dr. Arshad was consulted to care 

management of this patient; attending physicians in agreement with 

recommendations and disposition.

## 2020-03-08 NOTE — PDOC PROGRESS REPORT
Subjective


Progress Note for:: 03/08/20


Reason For Visit: 


STATUS EPILLEPTICUS


3/8/2020


Positive urine drug screen for marijuana and cocaine, seizure disorder, patient 

noncompliance.  Patient also now has a psychiatric disorder requires IVC.





Physical Exam


Vital Signs: 


                                        











Temp Pulse Resp BP Pulse Ox


 


 98.2 F   82   16   162/98 H  100 


 


 03/08/20 11:53  03/08/20 11:53  03/08/20 11:53  03/08/20 11:53  03/08/20 11:53








                                 Intake & Output











 03/07/20 03/08/20 03/09/20





 05:59 06:59 06:59


 


Intake Total   360


 


Output Total   


 


Balance   360


 


Weight   











General appearance: PRESENT: no acute distress


Respiratory exam: PRESENT: clear to auscultation dana.  ABSENT: rales, rhonchi, 

wheezes


Cardiovascular exam: PRESENT: RRR.  ABSENT: diastolic murmur, rubs, systolic 

murmur


Neurological exam: PRESENT: alert, awake, oriented to person, oriented to place,

oriented to time, oriented to situation, CN II-XII grossly intact.  ABSENT: 

motor sensory deficit


Psychiatric exam: PRESENT: anxious, unusual affect





Results


Laboratory Results: 


                                        





                                 03/04/20 03:53 





                                 03/04/20 03:53 








Impressions: 


                                        





Head CT  03/01/20 11:51


IMPRESSION:  NORMAL BRAIN CT WITHOUT CONTRAST.


EVIDENCE OF ACUTE STROKE: NO.


 








KUB X-Ray  03/01/20 14:22


IMPRESSION:  Enteric tube projecting over the left upper abdomen over the region

of the stomach.


 








Chest X-Ray  03/03/20 05:00


IMPRESSION:  IMPROVED AERATION.  NO ACUTE RADIOGRAPHIC FINDING IN THE CHEST.


 














Assessment and Plan





- Diagnosis


(1) Altered mental status


Qualifiers: 


   Altered mental status type: disorientation   Qualified Code(s): R41.0 - 

Disorientation, unspecified   


Is this a current diagnosis for this admission?: Yes   





(2) Cocaine abuse with intoxication


Is this a current diagnosis for this admission?: Yes   





(3) Encephalopathy acute


Is this a current diagnosis for this admission?: Yes   





(4) Rhabdomyolysis


Qualifiers: 


   Rhabdomyolysis type: non-traumatic   Qualified Code(s): M62.82 - 

Rhabdomyolysis   


Is this a current diagnosis for this admission?: Yes   





(5) Seizure


Is this a current diagnosis for this admission?: Yes   





(6) Bacteremia


Is this a current diagnosis for this admission?: Yes   





(7) Aspiration pneumonia


Qualifiers: 


   Aspiration pneumonia type: unspecified   Laterality: right   Lung location: 

lower lobe of lung   Qualified Code(s): J69.0 - Pneumonitis due to inhalation of

food and vomit   


Is this a current diagnosis for this admission?: Yes   





(8) Psychiatric disorder


Is this a current diagnosis for this admission?: Yes   





- Plan Summary


Summary: 


3/5/2020





Patient was transferred out of the ICU yesterday to the floor


This morning temperature is 98 pulse is 95 blood pressure 152/100


White count is still normal 4.1, H&H is stable


Chemistry panel is normal


Glucose normal





Keppra level is pending


Patient's affect today is very flat and aggressive appearing.  Patient is 

staring and will not answer questions.


Had to have Ativan last night and Geodon this morning, he was attempting to get 

out of bed attempting to leave the room and would not listen to the nurses.  

Security needed to be called


Keppra dosage is thousand milligrams every 12 hours


Is also currently on IV Zosyn every 6 hours


Other medications include Prinivil 40 mg daily and Procardia XL 30 mg daily





3/6/2020





Patient's blood pressures are being difficult to control because he is refusing 

to let the nurse give him IV medications as and refusing to take p.o.'s as well.




When patient was admitted to the hospital his pressure was in triple digits up 

to as high as 265/167


Even with refusing medications his blood pressures are averaging around 150/105





Patient is becoming more psychotic withdrawn refusing to cooperate, not 

answering questions, not in touch with reality.  





In the last 15 minutes patient had eloped from his room in the hospital had 

wandered out into the front of the hospital and was walking in for lanes of 

traffic with his hospital gown on.  Nursing staff as well as security and myself

convinced the patient to come back into his room at which time he was given H

aldol 5 mg IV and put in soft restraints.  Patient was also given IV 

antihypertensive meds





About 30 minutes prior I put in a consult for psychiatry to see the patient 

because of his bizarre behavior.  This called them as well and notified them 

that this event had just occurred and that his consult was more of an urgent 

basis now because of his ability to understand what is in his best interest.





I am going to put in stat labs just to make sure that there is no significant 

metabolic abnormalities, ever I doubt this to be the case.





3/7/2020


Patient was seen by psychiatry yesterday unfortunately he was under the effects 

of his Haldol and lethargic


They have returned early this morning and documentation is currently pending


Patient is cooperative today and asking to go home


I have spoken to his wife on the phone and she really does not want to take him 

home, said that she is unaware of where he was getting his illegal substances 

from, that he is very uncooperative at home





Patient's blood pressures are all over the chart from a low of 126/97 to a high 

of 180/120.  A lot of this has to do with patient refusing his p.o. medications 

and is having to try to give them to him IV.  Also it is based upon his 

agitation level


Keppra level is still pending


Patient sputum is growing out staph aureus and H flu.  Blood culture also 

growing out staph.  Both of these organisms appear to be sensitive to 

cephalosporins as well as the fluoroquinolones, as well as sulfa





Patient is currently on Zosyn but he has lost his IV site therefore I am going 

to order Keflex 500 mg 3 times daily, with a stop date of March 10





3/8/2020


Temperature 98.6 pulse 84 blood pressure times is normal and at other times as 

in this morning 165/120.  Earlier this morning his pressure was 133/74.   

Because of his elevated pressures I have increased his Procardia from 30 mg a 

day up to 60.  We will continue to use Apresoline as needed.


His Keppra level was subtherapeutic and I have also increased his Keppra 2000 mg

in the morning and 1500 mg in the evening.





Patient remains on his Cogentin scheduled daily and Haldol as needed.  The best 

I can tell from reading the MAR he is not using his Haldol


Presently this morning the patient was quite pleasant smiling and is asking to 

be discharge.  Not confrontational or hostile or belligerent, and was even 

making good eye contact with me.





Because of patient's behavior that was potentially harmful to himself and others

he is now IVC.  Patient could be discharged on his antihypertensives as well as 

his antibiotics, if a bed becomes available.








 





- Time


Time Spent with patient: 25-34 minutes

## 2020-03-08 NOTE — PSYCHOLOGICAL NOTE
Psych Note





- Psych Note


Date seen by psych provider: 03/08/20


Time seen by psych provider: 13:15 - attempted


Psych Note: 





Reason For Consult: Bizarre Behavior


Consent Permissions: Patient's wife, Lucia





Check in attempted with patient; he is currently sleeping.  Attending nurse 

reports the patient had not slept all night and this is the first time he has 

finally slept. She continued to report she has had no concerns for the patient's

behavior and confirms she will contact the behavioral health team if concerns 

arise.  





***updated****


Medication recommendations per [facility]'s contracted psychiatrist Dr. Teresa BRYANT 

are as follows


Haldol 5 mg twice daily


Cogentin 1 mg daily





Impression\plan:Patient is recommended for continued IVC.  Patient is a 

substance abuser.   While there is not a history of mental health, long term 

substance abuse can result in mental health diagnosis (ie. substance induced 

depression, psychosis, mood dysregulation etc). There are some concerns the 

patient has been experiencing some form(s) of hallucinations.  Patient has 

demonstrated behavior that is grossly irrational and inappropriate for the 

situation (ie walking out of the hospital and into traffic in only a hospital 

gown which resulted in stopping traffic and the patient's entire backside being 

exposed to the weather and public, when discussing this event patient laughed). 

At this time the patient is a danger to himself."  Updated Medication re

commendations have been provided. Dr. Arshad was consulted to care management 

of this patient; attending physicians in agreement with recommendations and 

disposition.

## 2020-03-08 NOTE — PROGRESS NOTE
Provider Note


Provider Note: 





Called to the bedside. Patient was perched on the night stand. Multiple security

personnel in place. Patient refused to come down. Was paranoid about people in 

his room. Refused to take his medicine. After unsuccessful attempts at calming 

the patient. I instructed security to restrain the patient in order to medicate 

him and apply soft restraints.

## 2020-03-09 NOTE — PDOC PROGRESS REPORT
Subjective


Progress Note for:: 03/09/20


Reason For Visit: 


STATUS EPILLEPTICUS








Physical Exam


Vital Signs: 


                                        











Temp Pulse Resp BP Pulse Ox


 


 98.6 F   78   16   136/97 H  97 


 


 03/09/20 11:16  03/09/20 11:16  03/09/20 11:16  03/09/20 11:16  03/09/20 11:16








                                 Intake & Output











 03/08/20 03/09/20 03/10/20





 06:59 06:59 06:59


 


Intake Total  960 360


 


Output Total  1100 


 


Balance  -140 360


 


Weight  72.8 kg 72.8 kg











General appearance: PRESENT: no acute distress, well-developed, well-nourished


Respiratory exam: PRESENT: clear to auscultation dana.  ABSENT: rales, rhonchi, 

wheezes


Cardiovascular exam: PRESENT: RRR.  ABSENT: diastolic murmur, rubs, systolic 

murmur


Neurological exam: PRESENT: alert, awake, oriented to person, oriented to place,

oriented to time, oriented to situation, CN II-XII grossly intact.  ABSENT: 

motor sensory deficit


Psychiatric exam: PRESENT: unusual affect - Patient is laughing and joking about

his behavior last night.  Patient describes what sounds like night terrors he 

says he wakes up sometimes at night and is very scared, and that is what 

happened last night





Results


Laboratory Results: 


                                        





                                 03/04/20 03:53 





                                 03/04/20 03:53 








Impressions: 


                                        





Head CT  03/01/20 11:51


IMPRESSION:  NORMAL BRAIN CT WITHOUT CONTRAST.


EVIDENCE OF ACUTE STROKE: NO.


 








KUB X-Ray  03/01/20 14:22


IMPRESSION:  Enteric tube projecting over the left upper abdomen over the region

of the stomach.


 








Chest X-Ray  03/03/20 05:00


IMPRESSION:  IMPROVED AERATION.  NO ACUTE RADIOGRAPHIC FINDING IN THE CHEST.


 














Assessment and Plan





- Diagnosis


(1) Altered mental status


Qualifiers: 


   Altered mental status type: disorientation   Qualified Code(s): R41.0 - 

Disorientation, unspecified   


Is this a current diagnosis for this admission?: Yes   





(2) Cocaine abuse with intoxication


Is this a current diagnosis for this admission?: Yes   





(3) Encephalopathy acute


Is this a current diagnosis for this admission?: Yes   





(4) Rhabdomyolysis


Qualifiers: 


   Rhabdomyolysis type: non-traumatic   Qualified Code(s): M62.82 - 

Rhabdomyolysis   


Is this a current diagnosis for this admission?: Yes   





(5) Seizure


Is this a current diagnosis for this admission?: Yes   





(6) Bacteremia


Is this a current diagnosis for this admission?: Yes   





(7) Aspiration pneumonia


Qualifiers: 


   Aspiration pneumonia type: unspecified   Laterality: right   Lung location: 

lower lobe of lung   Qualified Code(s): J69.0 - Pneumonitis due to inhalation of

food and vomit   


Is this a current diagnosis for this admission?: Yes   





(8) Psychiatric disorder


Is this a current diagnosis for this admission?: Yes   





- Plan Summary


Summary: 


3/5/2020





Patient was transferred out of the ICU yesterday to the floor


This morning temperature is 98 pulse is 95 blood pressure 152/100


White count is still normal 4.1, H&H is stable


Chemistry panel is normal


Glucose normal





Keppra level is pending


Patient's affect today is very flat and aggressive appearing.  Patient is 

staring and will not answer questions.


Had to have Ativan last night and Geodon this morning, he was attempting to get 

out of bed attempting to leave the room and would not listen to the nurses.  

Security needed to be called


Keppra dosage is thousand milligrams every 12 hours


Is also currently on IV Zosyn every 6 hours


Other medications include Prinivil 40 mg daily and Procardia XL 30 mg daily





3/6/2020





Patient's blood pressures are being difficult to control because he is refusing 

to let the nurse give him IV medications as and refusing to take p.o.'s as well.




When patient was admitted to the hospital his pressure was in triple digits up 

to as high as 265/167


Even with refusing medications his blood pressures are averaging around 150/105





Patient is becoming more psychotic withdrawn refusing to cooperate, not 

answering questions, not in touch with reality.  





In the last 15 minutes patient had eloped from his room in the hospital had 

wandered out into the front of the hospital and was walking in for lanes of 

traffic with his hospital gown on.  Nursing staff as well as security and myself

convinced the patient to come back into his room at which time he was given 

Haldol 5 mg IV and put in soft restraints.  Patient was also given IV 

antihypertensive meds





About 30 minutes prior I put in a consult for psychiatry to see the patient 

because of his bizarre behavior.  This called them as well and notified them 

that this event had just occurred and that his consult was more of an urgent 

basis now because of his ability to understand what is in his best interest.





I am going to put in stat labs just to make sure that there is no significant 

metabolic abnormalities, ever I doubt this to be the case.





3/7/2020


Patient was seen by psychiatry yesterday unfortunately he was under the effects 

of his Haldol and lethargic


They have returned early this morning and documentation is currently pending


Patient is cooperative today and asking to go home


I have spoken to his wife on the phone and she really does not want to take him 

home, said that she is unaware of where he was getting his illegal substances 

from, that he is very uncooperative at home





Patient's blood pressures are all over the chart from a low of 126/97 to a high 

of 180/120.  A lot of this has to do with patient refusing his p.o. medications 

and is having to try to give them to him IV.  Also it is based upon his 

agitation level


Keppra level is still pending


Patient sputum is growing out staph aureus and H flu.  Blood culture also 

growing out staph.  Both of these organisms appear to be sensitive to 

cephalosporins as well as the fluoroquinolones, as well as sulfa





Patient is currently on Zosyn but he has lost his IV site therefore I am going 

to order Keflex 500 mg 3 times daily, with a stop date of March 10





3/8/2020


Temperature 98.6 pulse 84 blood pressure times is normal and at other times as 

in this morning 165/120.  Earlier this morning his pressure was 133/74.   

Because of his elevated pressures I have increased his Procardia from 30 mg a 

day up to 60.  We will continue to use Apresoline as needed.


His Keppra level was subtherapeutic and I have also increased his Keppra 1000 mg

in the morning and 1500 mg in the evening.





Patient remains on his Cogentin scheduled daily and Haldol as needed.  The best 

I can tell from reading the MAR he is not using his Haldol


Presently this morning the patient was quite pleasant smiling and is asking to 

be discharge.  Not confrontational or hostile or belligerent, and was even 

making good eye contact with me.





Because of patient's behavior that was potentially harmful to himself and others

he is now IVC.  Patient could be discharged on his antihypertensives as well as 

his antibiotics, if a bed becomes available.





3/9/2020


It is approximately 1630 hrs. and I just received a call from psychiatry saying 

that they have rescinded his IVC papers and that they feel that he is safe to be

discharged home and not a threat to himself or others.   His behavior last night

when he was up on the nightstand and had to be restrained by security and then 

medicated makes me uneasy to discharge him.  Also placed in restraints.  

Especially since we do not really have a good treatment plan, medication plan.





He will need to go home on his Keppra 1000 milligrams in the morning and 1500 mg

in the evening


Keflex 500 mg mg 3 times daily, the stop date on the 3/14/20


So was Cogentin 1 mg daily, as well as his blood pressure medications.





It is my recommendation that the patient have at least a 24-hour period of not 

being restrained with soft restraints, and not having to have medication for his

agitation and bizarre behavior.  


Patient is a known substance abuser.  





Patient was originally admitted to the ICU intubated for cocaine abuse and sei

zure disorder.  Ever since patient has been extubated and up on the floor he has

had bizarre behavior.  Possibly due to anoxia from his seizures, possibly due to

ischemic changes secondary to his substance abuse, possibly underlying bipolar 

depression illness and/or other psychiatric illness such as paranoid 

schizophrenic.








 





- Time


Time Spent with patient: 35 or more minutes

## 2020-03-09 NOTE — PSYCHOLOGICAL NOTE
Psych Note





- Psych Note


Date seen by psych provider: 03/09/20


Time seen by psych provider: 12:50


Psych Note: 





Reason For Consult: Bizarre Behavior


Consent Permissions: Patient's wife, Lucia





Check in conducted with patient with wife at bedside. Patient was eating lunch 

and chatting with wife when clinician entered the room. Patient is oriented to 

person, place, time, date, and current president. Patient was able to provide 

the correct answer to 2+4, however was unable to provide the correct answer as 

to which month was 2 months ago. 





Patient's wife reports they "have been clean for 2 years." Wife expressed 

concern that "a girl" had provided patient with a pill or cigarette laced with 

"something" to help with patient's reported migraine. Wife reports patient has 

episodes of paranoia at home. Wife states she will ensure follow up appointment 

to a mental health provider for substance abuse. Wife states they occasionally 

drink wine. 





Patient reports " a seizure got me here." Patient reports last voluntary use of 

cocaine or other substance "been a while." Patient denies he has concerns for 

his safety. Patient expressed a desire to be discharged to his wife's care. 





Patient's wife contacted clinician approximately and hour and a half after check

in inquiring about discharge. Wife was informed that no decision regarding 

discharge had been made by the team. Clinician notes wife's slurred speech.  





Clinician staffed case with Dr. Burns who expressed some concerns with discharge 

given patient's behaviors. Dr. Burns shared that incident in which patient was 

found standing on the table beside his bed was suggestive of night terrors. 

Clinician explained that patient's presentation could be related to the seizures

and/or chronic cocaine abuse. Additional toxicology screen was discussed and was

declined. 





***updated****


Medication recommendations per [facility]'s contracted psychiatrist Dr. Teresa BRYANT 

are as follows


Haldol 5 mg twice daily


Cogentin 1 mg daily





Impression\plan:Patient is recommended rescind of IVC and is cleared from acute 

psychiatric services. Patient is a substance abuser who would benefit from 

substance abuse treatment.  While there is not a history of mental health, long 

term substance abuse can result in mental health diagnosis (ie. substance 

induced depression, psychosis, mood dysregulation etc). There are some concerns 

the patient has been experiencing some form(s) of hallucinations. Patient's c

urrent presentation could be a result of chronic cocaine abuse and/or seizure 

history. A substance abuse treatment recourse list with the options for [facility], [facility], and [facility] highlighted was placed in the patient's chart to be 

given to patient at discharge tomorrow. Dr. Arshad was consulted to care 

management of this patient; attending physicians in agreement with 

recommendations and disposition.

## 2020-03-16 NOTE — PDOC DISCHARGE SUMMARY
Impression





- Admit/DC Date/PCP


Admission Date/Primary Care Provider: 


  03/01/20 14:37





  





Discharge Date: 03/10/20





- Additional Information


Resuscitation Status: Full Code


Discharge Diet: Regular


Discharge Activity: Activity As Tolerated, Balance Activity w/Rest


Referrals: 


HUMZA COSME PA-C [NO LOCAL MD] -  (LEFT A MESSAGE FOR PROVIDER'S OFFICE TO [facility]

LL PATIENT WITH A HOSPITAL FOLLOW UP APPT.)


Prescriptions: 


Benztropine Mesylate [Cogentin 1 mg Tablet] 1 mg PO QHS #30 tablet


Cephalexin Monohydrate [Keflex 500 mg Capsule] 500 mg PO Q8 #12 capsule


Levetiracetam [Keppra 500 mg Tablet] 1,000 mg PO QAM #60 tablet


Levetiracetam [Keppra 500 mg Tablet] 1,500 mg PO QHS #90 tablet


Metoprolol Tartrate [Lopressor 25 mg Tablet] 25 mg PO Q12 #60 tablet


Lisinopril [Prinivil 10 mg Tablet] 30 mg PO DAILY #90 tablet


Nifedipine [Procardia XL 30 mg Tablet] 60 mg PO DAILY #60 tab.er.24


Home Medications: 








Acetaminophen [Tylenol 325 mg Tablet] 650 mg NG Q4HP PRN  tablet 03/10/20 


Benztropine Mesylate [Cogentin 1 mg Tablet] 1 mg PO QHS #30 tablet 03/10/20 


Cephalexin Monohydrate [Keflex 500 mg Capsule] 500 mg PO Q8 #12 capsule 03/10/20




Levetiracetam [Keppra 500 mg Tablet] 1,000 mg PO QAM #60 tablet 03/10/20 


Levetiracetam [Keppra 500 mg Tablet] 1,500 mg PO QHS #90 tablet 03/10/20 


Lisinopril [Prinivil 10 mg Tablet] 30 mg PO DAILY #90 tablet 03/10/20 


Metoprolol Tartrate [Lopressor 25 mg Tablet] 25 mg PO Q12 #60 tablet 03/10/20 


Nifedipine [Procardia XL 30 mg Tablet] 60 mg PO DAILY #60 tab.er.24 03/10/20 











History of Present Illiness


History of Present Illness: 


Per H&P by Dr. Restrepo: This patient is a 33 yo man with a known seizure disorder 

who is non-compliant with keppra and abuses narcotics and cocaine further 

lowering his seizure threshold. He had several seizures today and was intubated 

for airway protection in the field and seizing on ED presentation. Seizures have

stopped with IV keppra, ativan and propofol. He will be admitted to the ICU and 

extubated when awake.








Hospital Course


Hospital Course: 


Patient was admitted to the ICU for status epilepticus and placed on IV Keppra, 

propofol, and PRN Ativan.  He did require intubation for airway protection on 

3/1/2020.


He was placed on IV Zosyn due to concern for possible aspiration event during 

his seizure.   Blood cultures additionally grew gram-positive cocci while in the

ICU; for which he received Zosyn.  His clinical condition and leukocytosis r

apidly improved upon addition of antibiotic therapy.


He was supported with IV fluids and nephrotoxic medications were able to do 

development of an acute kidney injury.


Fortunately, respiratory status improved and he was successfully extubated on 

3/4/2020.  He was downgraded to medical floor and onto the hospitalist service 

on 3/5/2020.


Upon arrival to the medical floor, the patient did begin to exhibit impulsive 

behaviors which temporarily required IV medications and soft limb restraints.  

Mental health services were consulted and subsequently recommended IVC status.  

Per their recommendations, the patient was placed on Cogentin and Haldol for 

mood stabilization.


Health services rescinded his IVC on 3/9/2020.  The patient was observed for an 

additional 24 hours.  And although he continued to demonstrate odd behavior with

a frequent euphoric effect and borderline paranoid behaviors, he did comply with

nursing and medication recommendations, did not require as needed medications, 

or soft limb restraints.


Therefore, he was discharged home, into the care of his wife.


Strong recommendations that the patient follow-up with primary care provider and

establish with a mental health provider.  Patient would benefit from a counselor

who specializes in substance abuse.








Physical Exam


Vital Signs: 


                                        











Temp Pulse Resp BP Pulse Ox


 


 98.2 F   70   18   126/97 H  100 


 


 03/10/20 13:22  03/10/20 13:22  03/10/20 13:22  03/10/20 13:22  03/10/20 13:22











General appearance: PRESENT: no acute distress, cooperative, well-developed, 

well-nourished


Head exam: PRESENT: atraumatic, normocephalic


Eye exam: PRESENT: conjunctiva pink, EOMI, PERRLA.  ABSENT: scleral icterus


Mouth exam: PRESENT: moist, tongue midline


Respiratory exam: PRESENT: clear to auscultation dana, symmetrical, unlabored.  

ABSENT: rales, rhonchi, wheezes


Cardiovascular exam: PRESENT: RRR.  ABSENT: diastolic murmur, rubs, systolic 

murmur


Extremities exam: PRESENT: full ROM.  ABSENT: calf tenderness, clubbing, pedal 

edema


Musculoskeletal exam: PRESENT: ambulatory


Neurological exam: PRESENT: alert, awake, oriented to person, oriented to place,

oriented to time, oriented to situation, CN II-XII grossly intact.  ABSENT: 

motor sensory deficit


Psychiatric exam: PRESENT: normal mood, unusual affect.  ABSENT: homicidal 

ideation, suicidal ideation


Skin exam: PRESENT: dry, intact, warm.  ABSENT: cyanosis, rash





Results


Laboratory Results: 


                                        











WBC  4.1 10^3/uL (4.0-10.5)   03/04/20  03:53    


 


RBC  3.78 10^6/uL (4.35-5.55)  L  03/04/20  03:53    


 


Hgb  11.9 g/dL (13.5-17.0)  L  03/04/20  03:53    


 


Hct  34.4 % (37.9-51.0)  L  03/04/20  03:53    


 


MCV  91 fl (80-97)   03/04/20  03:53    


 


MCH  31.4 pg (27.0-33.4)   03/04/20  03:53    


 


MCHC  34.4 g/dL (32.0-36.0)   03/04/20  03:53    


 


RDW  12.9 % (11.5-14.0)   03/04/20  03:53    


 


Plt Count  203 10^3/uL (150-450)   03/04/20  03:53    


 


Lymph % (Auto)  30.1 % (13-45)   03/04/20  03:53    


 


Mono % (Auto)  16.7 % (3-13)  H  03/04/20  03:53    


 


Eos % (Auto)  1.8 % (0-6)   03/04/20  03:53    


 


Baso % (Auto)  0.8 % (0-2)   03/04/20  03:53    


 


Absolute Neuts (auto)  2.1 10^3/uL (1.7-8.2)   03/04/20  03:53    


 


Absolute Lymphs (auto)  1.2 10^3/uL (0.5-4.7)   03/04/20  03:53    


 


Absolute Monos (auto)  0.7 10^3/uL (0.1-1.4)   03/04/20  03:53    


 


Absolute Eos (auto)  0.1 10^3/uL (0.0-0.6)   03/04/20  03:53    


 


Absolute Basos (auto)  0.0 10^3/uL (0.0-0.2)   03/04/20  03:53    


 


Total Counted  100   03/01/20  11:07    


 


Seg Neutrophils %  50.6 % (42-78)   03/04/20  03:53    


 


Seg Neuts % (Manual)  89 % (42-78)  H  03/01/20  11:07    


 


Lymphocytes % (Manual)  4 % (13-45)  L  03/01/20  11:07    


 


Monocytes % (Manual)  6 % (3-13)   03/01/20  11:07    


 


Eosinophils % (Manual)  0 % (0-6)   03/01/20  11:07    


 


Basophils % (Manual)  1 % (0-2)   03/01/20  11:07    


 


Abs Neuts (Manual)  11.9 10^3/uL (1.7-8.2)  H  03/01/20  11:07    


 


Abs Lymphs (Manual)  0.5 10^3/uL (0.5-4.7)   03/01/20  11:07    


 


Abs Monocytes (Manual)  0.8 10^3/uL (0.1-1.4)   03/01/20  11:07    


 


Absolute Eos (Manual)  0.0 10^3/uL (0.0-0.6)   03/01/20  11:07    


 


Abs Basophils (Manual)  0.1 10^3/uL (0.0-0.2)   03/01/20  11:07    


 


Platelet Comment  ADEQUATE   03/01/20  11:07    


 


Carbonic Acid  1.16 mmol/L (1.05-1.35)   03/03/20  08:00    


 


HCO3/H2CO3 Ratio  18:1   03/03/20  08:00    


 


ABG pH  7.36  (7.35-7.45)   03/03/20  08:00    


 


ABG pCO2  38.6 mmHg (35-45)   03/03/20  08:00    


 


ABG pO2  132.8 mmHg ()  H  03/03/20  08:00    


 


ABG HCO3  21.3 mmol/L (20-24)   03/03/20  08:00    


 


ABG Total CO2  22.5 mmol/L (23-27)  L  03/03/20  08:00    


 


ABG O2 Saturation  98.6 % (94-98)  H  03/03/20  08:00    


 


ABG Base Excess  -3.8 mmol/L  03/03/20  08:00    


 


FiO2  30%   03/03/20  08:00    


 


Sodium  139.6 mmol/L (137-145)   03/04/20  03:53    


 


Potassium  3.8 mmol/L (3.6-5.0)   03/04/20  03:53    


 


Chloride  112 mmol/L ()  H  03/04/20  03:53    


 


Carbon Dioxide  21 mmol/L (22-30)  L  03/04/20  03:53    


 


Anion Gap  7  (5-19)   03/04/20  03:53    


 


BUN  6 mg/dL (7-20)  L  03/04/20  03:53    


 


Creatinine  0.94 mg/dL (0.52-1.25)   03/04/20  03:53    


 


Est GFR ( Amer)  > 60  (>60)   03/04/20  03:53    


 


Est GFR (Non-Af Amer)  Cancelled   03/03/20  03:55    


 


Est GFR (MDRD) Non-Af  > 60  (>60)   03/04/20  03:53    


 


Glucose  73 mg/dL ()  L  03/04/20  03:53    


 


POC Glucose  100 mg/dL ()   03/06/20  17:44    


 


Lactic Acid  3.0 mmol/L (0.7-2.1)  H  03/01/20  12:30    


 


Calcium  8.4 mg/dL (8.4-10.2)   03/04/20  03:53    


 


Magnesium  1.9 mg/dL (1.6-2.3)   03/04/20  03:53    


 


Total Bilirubin  0.9 mg/dL (0.2-1.3)   03/04/20  03:53    


 


Direct Bilirubin  0.0 mg/dL (0.0-0.4)   03/04/20  03:53    


 


Neonat Total Bilirubin  Not Reportable   03/04/20  03:53    


 


Neonat Direct Bilirubin  Not Reportable   03/04/20  03:53    


 


Neonat Indirect Bili  Not Reportable   03/04/20  03:53    


 


AST  14 U/L (17-59)  L  03/04/20  03:53    


 


ALT  8 U/L (<50)   03/04/20  03:53    


 


Alkaline Phosphatase  59 U/L ()   03/04/20  03:53    


 


Total Protein  5.8 g/dL (6.3-8.2)  L  03/04/20  03:53    


 


Albumin  3.0 g/dL (3.5-5.0)  L  03/04/20  03:53    


 


EGFR   Cancelled   03/03/20  03:55    


 


Urine Color  STRAW   03/01/20  12:01    


 


Urine Appearance  SLIGHTLY-CLOUDY   03/01/20  12:01    


 


Urine pH  5.0  (5.0-9.0)   03/01/20  12:01    


 


Ur Specific Gravity  1.015   03/01/20  12:01    


 


Urine Protein  100 mg/dL (NEGATIVE)  H  03/01/20  12:01    


 


Urine Glucose (UA)  50 mg/dL (NEGATIVE)  H  03/01/20  12:01    


 


Urine Ketones  TRACE mg/dL (NEGATIVE)  H  03/01/20  12:01    


 


Urine Blood  MODERATE  (NEGATIVE)  H  03/01/20  12:01    


 


Urine Nitrite  NEGATIVE  (NEGATIVE)   03/01/20  12:01    


 


Urine Bilirubin  NEGATIVE  (NEGATIVE)   03/01/20  12:01    


 


Urine Urobilinogen  NEGATIVE mg/dL (<2.0)   03/01/20  12:01    


 


Ur Leukocyte Esterase  NEGATIVE  (NEGATIVE)   03/01/20  12:01    


 


Urine WBC (Auto)  1 /HPF  03/01/20  12:01    


 


Urine RBC (Auto)  0 /HPF  03/01/20  12:01    


 


U Hyaline Cast (Auto)  1 /LPF  03/01/20  12:01    


 


Squamous Epi Cells Auto  1 /HPF  03/01/20  12:01    


 


Urine Mucus (Auto)  RARE /LPF  03/01/20  12:01    


 


Urine Ascorbic Acid  NEGATIVE  (NEGATIVE)   03/01/20  12:01    


 


Urine Opiates Screen  NEGATIVE   03/01/20  12:01    


 


Urine Methadone Screen  NEGATIVE   03/01/20  12:01    


 


Ur Barbiturates Screen  NEGATIVE   03/01/20  12:01    


 


Levetiracetam  6.6 ug/mL (10.0-40.0)  L  03/05/20  11:16    


 


Ur Phencyclidine Scrn  NEGATIVE   03/01/20  12:01    


 


Ur Amphetamines Screen  NEGATIVE   03/01/20  12:01    


 


U Benzodiazepines Scrn  UNCONFIRMED POSITIVE   03/01/20  12:01    


 


Urine Cocaine Screen  UNCONFIRMED POSITIVE   03/01/20  12:01    


 


U Marijuana (THC) Screen  NEGATIVE   03/01/20  12:01    


 


Serum Alcohol  < 10 mg/dL (NONE DETECTED)   03/01/20  13:45    











Impressions: 


                                        





Chest X-Ray  03/01/20 11:51


IMPRESSION:  ENDOTRACHEAL TUBE APPEARS IN SATISFACTORY POSITION.  ATELECTASIS IN

THE RIGHT LUNG BASE.


 








Head CT  03/01/20 11:51


IMPRESSION:  NORMAL BRAIN CT WITHOUT CONTRAST.


EVIDENCE OF ACUTE STROKE: NO.


 








KUB X-Ray  03/01/20 14:22


IMPRESSION:  Enteric tube projecting over the left upper abdomen over the region

of the stomach.


 








Chest X-Ray  03/03/20 05:00


IMPRESSION:  IMPROVED AERATION.  NO ACUTE RADIOGRAPHIC FINDING IN THE CHEST.


 














Plan


Plan of Treatment: 


Patient is discharged home in stable condition.


He is instructed to follow-up with his primary care provider within 1 week.


Follow-up with neurologist as scheduled.


Follow-up with recommend referral to outpatient mental health services.


Take your other medication as prescribed.


Eat a heart healthy diet.


Do NOT smoke.


Return to emergency department as needed for concerning symptoms.


Time Spent: Greater than 30 Minutes





Stroke


Is this a Stroke Patient?: No





Acute Heart Failure





- **


Is this a Heart Failure Patient?: No

## 2020-03-29 NOTE — RADIOLOGY REPORT (SQ)
EXAM DESCRIPTION:  CT HEAD WITHOUT



IMAGES COMPLETED DATE/TIME:  3/29/2020 2:42 pm



REASON FOR STUDY:  seizure, etoh use, AMS



COMPARISON:  3/1/2020



TECHNIQUE:  Axial images acquired through the brain without intravenous contrast.  Images reviewed wi
th bone, brain and subdural windows.  Additional sagittal and coronal reconstructions were generated.
 Images stored on PACS.

All CT scanners at this facility use dose modulation, iterative reconstruction, and/or weight based d
osing when appropriate to reduce radiation dose to as low as reasonably achievable (ALARA).

[facility]: Dose Right  [facility]: CareDose    [facility]: Dose Right    [facility]: Teradose 4D    [facility]: Smart Technologies



RADIATION DOSE:  CT Rad equipment meets quality standard of care and radiation dose reduction techniq
ues were employed. CTDIvol: 53.2 mGy. DLP: 1044 mGy-cm. mGy.



LIMITATIONS:  None.



FINDINGS:  VENTRICLES: Normal size and contour.

CEREBRUM: No masses.  No hemorrhage.  No midline shift.  No evidence for acute infarction. Normal gra
y/white matter differentiation. No areas of low density in the white matter.

CEREBELLUM: No masses.  No hemorrhage.  No alteration of density.  No evidence for acute infarction.

EXTRAAXIAL SPACES: No fluid collections.  No masses.

ORBITS AND GLOBE: No intra- or extraconal masses.  Normal contour of globe without masses.

CALVARIUM: No fracture.

PARANASAL SINUSES: No fluid or mucosal thickening.

SOFT TISSUES: Mild left frontal scalp soft tissue swelling/ hematoma.  No underlying fracture.

OTHER: No other significant finding.



IMPRESSION:  No acute intracranial abnormality.

EVIDENCE OF ACUTE STROKE: NO.



COMMENT:  Quality ID # 436: Final reports with documentation of one or more dose reduction techniques
 (e.g., Automated exposure control, adjustment of the mA and/or kV according to patient size, use of 
iterative reconstruction technique)



TECHNICAL DOCUMENTATION:  JOB ID:  [id number]

 2011 [employer]- All Rights Reserved



Reading location - IP/workstation name: [email or web address]

## 2020-03-29 NOTE — ER DOCUMENT REPORT
ED Seizure





- General


Stated Complaint: SEIZURES


Time Seen by Provider: 03/29/20 12:11


Mode of Arrival: Medic


Information source: Transfer Record


Cannot obtain history due to: Altered mental status


Notes: 





Patient presents after having 2 witnessed seizures at home.  Patient has a 

history of known seizure disorder and noncompliance with his antiepileptic 

medication.  Patient also has a history of substance abuse including alcohol and

cocaine.  EMS not report any known fall or injury.  Upon arrival to ER, patient 

did state that he had to urinate and has otherwise not responding to questionin

g.





- HPI


Patient complains to provider of: History of seizures


Current seizure medications: Keppra


Preceding symptoms/context: Recent alcohol intake, Recent drug use


Injuries: None


Treatment PTA: No: Advanced airway





- Related Data


Allergies/Adverse Reactions: 


                                        





hydrocodone [From Vicodin] Allergy (Verified 03/29/20 16:14)


   











Past Medical History





- General


Information source: Transfer Record





- Social History


Smoking Status: Unknown if Ever Smoked


Frequency of alcohol use: Occasional


Drug Abuse: Cocaine


Lives with: Spouse/Significant other


Family History: Reviewed & Not Pertinent, Other - unable to determine at this 

time





- Past Medical History


Cardiac Medical History: Reports: Hx Hypertension


Neurological Medical History: Reports: Hx Seizures


Renal/ Medical History: Denies: Hx Peritoneal Dialysis


Psychiatric Medical History: 


   Denies: Hx Depression


Traumatic Medical History: Reports: Hx Traumatic Brain Injury


Surgical Hx: Negative





- Immunizations


Immunizations up to date: No


Hx Diphtheria, Pertussis, Tetanus Vaccination: Yes





Review of Systems





- Review of Systems


-: Yes ROS unobtainable due to patient's medical condition





Physical Exam





- Vital signs


Vitals: 


                                        











Resp Pulse Ox


 


 15   98 


 


 03/29/20 12:15  03/29/20 12:15














- General


Notes: 





Resting with eyes closed, initially opened eyes when reporting he had to urinate





- HEENT


Head: Normocephalic.  No: Atraumatic, Abrasions, Rosen's sign, Ecchymosis, 

Tenderness


Nasal: Normal


Mouth/Lips: Normal


Mucous membranes: Normal


Neck: Normal, Supple





- Respiratory


Respiratory status: No respiratory distress


Chest status: Nontender


Breath sounds: Normal.  No: Rales, Rhonchi, Stridor, Wheezing


Chest palpation: Normal





- Cardiovascular


Rhythm: Regular


Heart sounds: S1 appreciated, S2 appreciated





- Abdominal


Inspection: Normal





- Extremities


General upper extremity: Normal inspection, Nontender, Normal ROM


General lower extremity: Normal inspection, Nontender, Normal ROM





- Neurological


Neuro grossly intact: Yes


Orientation: AAOx4


Vidal Coma Scale Eye Opening: Spontaneous


Nell Coma Scale Verbal: Oriented


Vidal Coma Scale Motor: Obeys Commands


Nell Coma Scale Total: 15





- Psychological


Associated symptoms: Normal affect, Normal mood





- Skin


Skin Temperature: Warm


Skin Moisture: Dry


Skin Color: Normal





Course





- Re-evaluation


Re-evalutation: 





03/29/20 12:31


Consulted with Dr. Ring regarding patient presentation and plan to start IV 

Keppra, Dr. Ring advises not giving IV Keppra at this time and consulting with 

intensivist.


03/29/20 12:37


Consulted with Dr. Rizvi regarding patient presentation and known history of

noncompliance and report of 2 seizures witnessed at home today by significant 

other.  Advises giving loading dose of Keppra 1 g IV.


03/29/20 15:06


Patient resting with eyes closed, arouses easily to voice.  Patient requesting 

food at this time.  Patient mildly hypertensive although does state that he did 

not take his blood pressure medicine at home.


03/29/20 16:18


Patient will occasionally ask for food, and then when staff are at bedside 

patient refuses to answer without repeated questioning.  Patient then responds 

and answers questions appropriately.  Other staff states that this is typical 

behavior of patient who are familiar with him from his repeated visits for 

seizures.


03/29/20 16:31


Consulted with Dr. Ring regarding patient presentation diagnostic evaluation.  

Patient without any seizure activity here, recommends discharge at this time.  

Patient does have prescriptions for all of his blood pressure and antiepileptic 

medications that do appear to be filled earlier this month.


03/29/20 17:09


Patient with stable vital signs.  No additional seizure activity here.


03/29/20 17:26


Patient provided the number for his brother-in-law at [phone].  

Brother-in-law was not there although his wife was there.  Wife states that 

patient has been noncompliant with his medication until through the excuse that 

he is blessed by God and does not need it.  Wife does state that he does have 

his medications there.





- Vital Signs


Vital signs: 


                                        











Temp Pulse Resp BP Pulse Ox


 


 98.9 F      13   173/124 H  100 


 


 03/29/20 12:17     03/29/20 16:01  03/29/20 16:01  03/29/20 16:01














- Laboratory


Result Diagrams: 


                                 03/29/20 12:21





                                 03/29/20 12:21


Laboratory results interpreted by me: 


                                        











  03/29/20 03/29/20 03/29/20





  12:21 12:21 12:21


 


Seg Neutrophils %  78.1 H  


 


Carbon Dioxide   16 L 


 


Creatinine   1.26 H 


 


Magnesium   


 


Urine Blood    MODERATE H














  03/29/20





  12:21


 


Seg Neutrophils % 


 


Carbon Dioxide 


 


Creatinine 


 


Magnesium  2.8 H


 


Urine Blood 











                               Labs- Entire Visit











  03/29/20 03/29/20 03/29/20





  12:21 12:21 12:21


 


WBC  5.2  


 


RBC  4.41  


 


Hgb  13.6  


 


Hct  40.5  


 


MCV  92  


 


MCH  30.9  


 


MCHC  33.6  


 


RDW  13.4  


 


Plt Count  212  


 


Lymph % (Auto)  13.3  


 


Mono % (Auto)  7.4  


 


Eos % (Auto)  0.3  


 


Baso % (Auto)  0.9  


 


Absolute Neuts (auto)  4.1  


 


Absolute Lymphs (auto)  0.7  


 


Absolute Monos (auto)  0.4  


 


Absolute Eos (auto)  0.0  


 


Absolute Basos (auto)  0.0  


 


Seg Neutrophils %  78.1 H  


 


VBG pH   


 


VBG pCO2   


 


VBG HCO3   


 


VBG Base Excess   


 


Sodium   138.5 


 


Potassium   4.0 


 


Chloride   105 


 


Carbon Dioxide   16 L 


 


Anion Gap   18 


 


BUN   8 


 


Creatinine   1.26 H 


 


Est GFR ( Amer)   > 60 


 


Est GFR (MDRD) Non-Af   > 60 


 


Glucose   80 


 


Calcium   9.4 


 


Magnesium   


 


Total Bilirubin   0.4 


 


Direct Bilirubin   0.3 


 


Neonat Total Bilirubin   Not Reportable 


 


Neonat Direct Bilirubin   Not Reportable 


 


Neonat Indirect Bili   Not Reportable 


 


AST   24 


 


ALT   21 


 


Alkaline Phosphatase   82 


 


Total Protein   7.9 


 


Albumin   4.5 


 


Urine Color    COLORLESS


 


Urine Appearance    CLEAR


 


Urine pH    5.0


 


Ur Specific Gravity    1.009


 


Urine Protein    NEGATIVE


 


Urine Glucose (UA)    NEGATIVE


 


Urine Ketones    NEGATIVE


 


Urine Blood    MODERATE H


 


Urine Nitrite    NEGATIVE


 


Urine Bilirubin    NEGATIVE


 


Urine Urobilinogen    NEGATIVE


 


Ur Leukocyte Esterase    NEGATIVE


 


Urine WBC (Auto)    0


 


Urine RBC (Auto)    0


 


U Hyaline Cast (Auto)    1


 


Urine Bacteria (Auto)    TRACE


 


Squamous Epi Cells Auto    <1


 


Urine Mucus (Auto)    RARE


 


Urine Ascorbic Acid    NEGATIVE


 


Urine Opiates Screen   


 


Urine Methadone Screen   


 


Ur Barbiturates Screen   


 


Ur Phencyclidine Scrn   


 


Ur Amphetamines Screen   


 


U Benzodiazepines Scrn   


 


Urine Cocaine Screen   


 


U Marijuana (THC) Screen   


 


Serum Alcohol   < 10 














  03/29/20 03/29/20 03/29/20





  12:21 12:21 12:59


 


WBC   


 


RBC   


 


Hgb   


 


Hct   


 


MCV   


 


MCH   


 


MCHC   


 


RDW   


 


Plt Count   


 


Lymph % (Auto)   


 


Mono % (Auto)   


 


Eos % (Auto)   


 


Baso % (Auto)   


 


Absolute Neuts (auto)   


 


Absolute Lymphs (auto)   


 


Absolute Monos (auto)   


 


Absolute Eos (auto)   


 


Absolute Basos (auto)   


 


Seg Neutrophils %   


 


VBG pH    7.30


 


VBG pCO2    44.0


 


VBG HCO3    21.3


 


VBG Base Excess    -5.0


 


Sodium   


 


Potassium   


 


Chloride   


 


Carbon Dioxide   


 


Anion Gap   


 


BUN   


 


Creatinine   


 


Est GFR ( Amer)   


 


Est GFR (MDRD) Non-Af   


 


Glucose   


 


Calcium   


 


Magnesium   2.8 H 


 


Total Bilirubin   


 


Direct Bilirubin   


 


Neonat Total Bilirubin   


 


Neonat Direct Bilirubin   


 


Neonat Indirect Bili   


 


AST   


 


ALT   


 


Alkaline Phosphatase   


 


Total Protein   


 


Albumin   


 


Urine Color   


 


Urine Appearance   


 


Urine pH   


 


Ur Specific Gravity   


 


Urine Protein   


 


Urine Glucose (UA)   


 


Urine Ketones   


 


Urine Blood   


 


Urine Nitrite   


 


Urine Bilirubin   


 


Urine Urobilinogen   


 


Ur Leukocyte Esterase   


 


Urine WBC (Auto)   


 


Urine RBC (Auto)   


 


U Hyaline Cast (Auto)   


 


Urine Bacteria (Auto)   


 


Squamous Epi Cells Auto   


 


Urine Mucus (Auto)   


 


Urine Ascorbic Acid   


 


Urine Opiates Screen  NEGATIVE  


 


Urine Methadone Screen  NEGATIVE  


 


Ur Barbiturates Screen  NEGATIVE  


 


Ur Phencyclidine Scrn  NEGATIVE  


 


Ur Amphetamines Screen  NEGATIVE  


 


U Benzodiazepines Scrn  NEGATIVE  


 


Urine Cocaine Screen  UNCONFIRMED POSITIVE  


 


U Marijuana (THC) Screen  NEGATIVE  


 


Serum Alcohol   














- Diagnostic Test


Radiology reviewed: Reports reviewed





- EKG Interpretation by Me


EKG shows normal: Sinus rhythm


Rate: Normal


Additional EKG results interpreted by me: 





03/29/20 17:10


No ST elevation, QTc 458





Discharge





- Discharge


Clinical Impression: 


 Seizure, Noncompliance, Cocaine abuse





Condition: Stable


Disposition: HOME, SELF-CARE


Instructions:  Cocaine Abuse ([facility]), Seizure, Known Epileptic ([facility])


Additional Instructions: 


Return immediately for any new or worsening symptoms





Followup with your primary care provider, call tomorrow to make a followup 

appointment





Take your blood pressure medication and your seizure medications at home as 

prescribed





Avoid use of cocaine

## 2020-03-29 NOTE — EKG REPORT
SEVERITY:- ABNORMAL ECG -

SINUS RHYTHM

LEFT ATRIAL ABNORMALITY

:

Confirmed by: Alvin Fountain MD 29-Mar-2020 13:56:08

## 2020-03-29 NOTE — RADIOLOGY REPORT (SQ)
EXAM DESCRIPTION:  CHEST SINGLE VIEW



IMAGES COMPLETED DATE/TIME:  3/29/2020 1:43 pm



REASON FOR STUDY:  seizure, AMS



COMPARISON:  3/3/2020



NUMBER OF VIEWS:  One view.



TECHNIQUE:  Single frontal radiographic view of the chest acquired.



LIMITATIONS:  None.



FINDINGS:  LUNGS AND PLEURA: No opacities, masses or pneumothorax. No pleural effusion.

MEDIASTINUM AND HILAR STRUCTURES: No masses.  Contour normal.

HEART AND VASCULAR STRUCTURES: Heart normal in size.  Normal vasculature.

BONES: No acute findings.

HARDWARE: None in the chest.

OTHER: No other significant finding.



IMPRESSION:  NO SIGNIFICANT RADIOGRAPHIC FINDING IN THE CHEST.



TECHNICAL DOCUMENTATION:  JOB ID:  [id number]

 2011 [employer]- All Rights Reserved



Reading location - IP/workstation name: [email or web address]

## 2020-04-29 NOTE — RADIOLOGY REPORT (SQ)
EXAM DESCRIPTION:  CT HEAD WITHOUT



IMAGES COMPLETED DATE/TIME:  4/29/2020 10:55 am



REASON FOR STUDY:  seizure, hx subs abuse



COMPARISON:  None.



TECHNIQUE:  Axial images acquired through the brain without intravenous contrast.  Images reviewed wi
th bone, brain and subdural windows.  Additional sagittal and coronal reconstructions were generated.
 Images stored on PACS.

All CT scanners at this facility use dose modulation, iterative reconstruction, and/or weight based d
osing when appropriate to reduce radiation dose to as low as reasonably achievable (ALARA).

[facility]: Dose Right  [facility]: CareDose    [facility]: Dose Right    [facility]: Teradose 4D    [facility]: [employer]



RADIATION DOSE:  CT Rad equipment meets quality standard of care and radiation dose reduction techniq
ues were employed. CTDIvol: 53.2 mGy. DLP: 1177 mGy-cm. mGy.



LIMITATIONS:  None.



FINDINGS:  VENTRICLES: Normal size and contour.

CEREBRUM: No masses.  No hemorrhage.  No midline shift.  No evidence for acute infarction. Normal gra
y/white matter differentiation. No areas of low density in the white matter.

CEREBELLUM: No masses.  No hemorrhage.  No alteration of density.  No evidence for acute infarction.

EXTRAAXIAL SPACES: No fluid collections.  No masses.

ORBITS AND GLOBE: No intra- or extraconal masses.  Normal contour of globe without masses.

CALVARIUM: No fracture.

PARANASAL SINUSES: No fluid or mucosal thickening.

SOFT TISSUES: No mass or hematoma.

OTHER: No other significant finding.



IMPRESSION:  NORMAL BRAIN CT WITHOUT CONTRAST.

EVIDENCE OF ACUTE STROKE: NO.



COMMENT:  Quality ID # 436: Final reports with documentation of one or more dose reduction techniques
 (e.g., Automated exposure control, adjustment of the mA and/or kV according to patient size, use of 
iterative reconstruction technique)



TECHNICAL DOCUMENTATION:  JOB ID:  [id number]

 2011 [employer]- All Rights Reserved



Reading location - IP/workstation name: [email or web address]

## 2020-04-29 NOTE — EKG REPORT
SEVERITY:- ABNORMAL ECG -

SINUS RHYTHM

BIATRIAL ABNORMALITIES

LEFT VENTRICULAR HYPERTROPHY

:

Confirmed by: Nathaly Wood MD 29-Apr-2020 22:04:42

## 2020-04-29 NOTE — ER DOCUMENT REPORT
ED Seizure





- General


Chief Complaint: Seizure


Stated Complaint: POSSIBLE SEIZURE


Time Seen by Provider: 04/29/20 08:19


Primary Care Provider: 


[facility] [Provider Group] - Follow up tomorrow


[facility] [Provider Group] - Follow up as needed


[facility] [Provider Group] - Follow up as needed


Information source: Patient, Transfer Record


Notes: 





Patient presents after having 2 witnessed seizures this morning.  Seizures were 

about an hour apart with the last 1 just prior to arrival.  Patient was in bed 

and the first seizure lasted for about a minute and there was no injury.  Second

seizure occurred when patient was sitting in a chair and there was no additional

injury.  Wife observed seizure activity.  Wife called EMS informing them that he

has a history of noncompliance.  Patient has been seen numerous times in the 

past for similar presentation with noncompliance of medications. 





- HPI


Patient complains to provider of: History of seizures


Current seizure medications: Keppra


Character of seizure: Generalized shaking


Post-ictal symptoms: Confusion


Injuries: None





- Related Data


Allergies/Adverse Reactions: 


                                        





hydrocodone [From Vicodin] Allergy (Verified 03/29/20 16:14)


   











Past Medical History





- General


Information source: Transfer Record, [facility] Records





- Social History


Smoking Status: Never Smoker


Frequency of alcohol use: Occasional


Drug Abuse: Cocaine


Lives with: Family


Family History: Reviewed & Not Pertinent, Other - unable to determine at this 

time





- Past Medical History


Cardiac Medical History: Reports: Hx Hypertension


Neurological Medical History: Reports: Hx Seizures


Renal/ Medical History: Denies: Hx Peritoneal Dialysis


Psychiatric Medical History: Reports: Hx Anxiety, Hx Depression


Traumatic Medical History: Reports: Hx Traumatic Brain Injury


Surgical Hx: Negative





- Immunizations


Immunizations up to date: No


Hx Diphtheria, Pertussis, Tetanus Vaccination: Yes





Review of Systems





- Review of Systems


-: Yes ROS unobtainable due to patient's medical condition





Physical Exam





- Vital signs


Vitals: 


                                        











Temp


 


 98.0 F 


 


 04/29/20 08:12














- General


In distress: None


Notes: 





Resting with eyes closed, postictal state





- HEENT


Head: Normocephalic, Atraumatic


Nasal: Normal


Mouth/Lips: Normal


Neck: Normal, Supple





- Respiratory


Respiratory status: No respiratory distress


Chest status: Nontender


Breath sounds: Normal.  No: Rales, Rhonchi, Stridor, Wheezing


Chest palpation: Normal





- Cardiovascular


Rhythm: Regular


Heart sounds: S1 appreciated, S2 appreciated





- Abdominal


Inspection: Normal


Bowel sounds: Normal





- Extremities


General upper extremity: Normal inspection, Normal ROM


General lower extremity: Normal inspection, Normal ROM





- Neurological


Neuro grossly intact: Yes


Cognition: Normal


Cromwell Coma Scale Eye Opening: Spontaneous


Nell Coma Scale Verbal: Oriented


Cromwell Coma Scale Motor: Obeys Commands


Cromwell Coma Scale Total: 15





- Psychological


Associated symptoms: Normal affect, Normal mood





- Skin


Skin Temperature: Warm


Skin Moisture: Dry


Skin Color: Normal





Course





- Re-evaluation


Re-evalutation: 





04/29/20 08:47


Patient with additional seizure just lasting a few seconds, Keppra had not been 

initiated, RN to obtain medication at this time.  Dr. Tavera bedside, advises 

just given the Keppra and not given any Ativan at this time.


04/29/20 10:35


Dr Tavera advises obtaining head CT given patient's history of cocaine abuse 

in the past.


04/29/20 11:55


Patient resting with eyes closed, CT scan reviewed, no acute findings.


04/29/20 13:10


Patient arouses easily to voice.  Will treat patient's hypertension at this 

time.


04/29/20 14:10


Patient seen at bedside states that he is ready to go home and wants to call his

ride.  Patient without any additional seizure activity.  Patient states that he 

has been taking his medication although wife disputes this.  Patient encouraged 

to follow-up with a primary doctor for further evaluation.  Patient denies any 

complaints at this time.





- Vital Signs


Vital signs: 


                                        











Temp Pulse Resp BP Pulse Ox


 


 98.0 F   72   17   170/124 H  100 


 


 04/29/20 08:12  04/29/20 08:36  04/29/20 10:01  04/29/20 10:00  04/29/20 10:01














- Laboratory


Result Diagrams: 


                                 04/29/20 09:29





                                 04/29/20 08:30


Laboratory results interpreted by me: 


                                        











  04/29/20





  08:30


 


Carbon Dioxide  16 L


 


Total Protein  8.7 H


 


Albumin  5.2 H











                               Labs- Entire Visit











  04/29/20 04/29/20 04/29/20





  08:30 08:30 09:29


 


WBC  Cancelled   7.1


 


RBC  Cancelled   5.35


 


Hgb  Cancelled   16.6


 


Hct  Cancelled   49.1


 


MCV  Cancelled   92


 


MCH  Cancelled   31.1


 


MCHC  Cancelled   33.9


 


RDW  Cancelled   13.8


 


Plt Count  Cancelled   195


 


Lymph % (Auto)  Cancelled   16.8


 


Mono % (Auto)  Cancelled   9.4


 


Eos % (Auto)  Cancelled   0.1


 


Baso % (Auto)  Cancelled   0.9


 


Absolute Neuts (auto)  Cancelled   5.2


 


Absolute Lymphs (auto)  Cancelled   1.2


 


Absolute Monos (auto)  Cancelled   0.7


 


Absolute Eos (auto)  Cancelled   0.0


 


Absolute Basos (auto)  Cancelled   0.1


 


Seg Neutrophils %  Cancelled   72.8


 


Platelet Estimate  Cancelled  


 


Sodium   138.9 


 


Potassium   4.5 


 


Chloride   104 


 


Carbon Dioxide   16 L 


 


Anion Gap   19 


 


BUN   10 


 


Creatinine   1.08 


 


Est GFR ( Amer)   > 60 


 


Est GFR (MDRD) Non-Af   > 60 


 


Glucose   95 


 


Calcium   10.0 


 


Total Bilirubin   0.6 


 


Direct Bilirubin   0.0 


 


Neonat Total Bilirubin   Not Reportable 


 


Neonat Direct Bilirubin   Not Reportable 


 


Neonat Indirect Bili   Not Reportable 


 


AST   33 


 


ALT   23 


 


Alkaline Phosphatase   84 


 


Total Protein   8.7 H 


 


Albumin   5.2 H 


 


Slides for Path Review  Cancelled  














- Diagnostic Test


Radiology reviewed: Reports reviewed





- EKG Interpretation by Me


EKG shows normal: Sinus rhythm


When compared to previous EKG there are: No significant change


Additional EKG results interpreted by me: 





04/29/20 10:03


No ST elevation, QTc 461





Discharge





- Discharge


Clinical Impression: 


 Seizure





Hypertension


Qualifiers:


 Hypertension type: unspecified Qualified Code(s): I10 - Essential (primary) 

hypertension





Condition: Stable


Disposition: HOME, SELF-CARE


Instructions:  High Blood Pressure, Requiring Treatment ([facility]), Seizure, Known 

Epileptic ([facility])


Additional Instructions: 


Return immediately for any new or worsening symptoms





Followup with your primary care provider, call tomorrow to make a followup 

appointment





Take your medications that you have at home as prescribed


Referrals: 


[facility] [Provider Group] - Follow up as needed


[facility] [Provider Group] - Follow up as needed


[facility] [Provider Group] - Follow up tomorrow

## 2020-05-17 NOTE — ER DOCUMENT REPORT
Entered by HANNAH GENAO SCRIBE  05/17/20 1131 





Acting as scribe for:GEORGIA PRIDE MD





ED Seizure





- General


Stated Complaint: ALTERED MENTAL STATUS


Mode of Arrival: Ambulatory


Information source: Patient


Cannot obtain history due to: Altered mental status


Notes: 





This 34 year old male patient with epilepsy and medical noncompliance presents 

to the emergency department today with complaints of seizures prior to arrival. 

Patient has been here multiple times for seizures as he habitually does not take

his Keppra. 





Today EMS was called by family for altered mental status and the patient was 

found to be postictal.








- Related Data


Allergies/Adverse Reactions: 


                                        





hydrocodone [From Vicodin] Allergy (Verified 05/17/20 11:47)


   











Past Medical History





- General


Information source: Patient, [facility] Records


Cannot obtain history due to: Altered mental status





- Social History


Smoking Status: Current Every Day Smoker


Cigarette use (# per day): Yes


Frequency of alcohol use: None


Drug Abuse: None


Lives with: Family


Family History: Reviewed & Not Pertinent, Other - unable to determine at this 

time





- Past Medical History


Cardiac Medical History: Reports: Hx Hypertension


Neurological Medical History: Reports: Hx Seizures


Psychiatric Medical History: Reports: Hx Anxiety, Hx Depression


Traumatic Medical History: Reports: Hx Traumatic Brain Injury


Surgical Hx: Negative





- Immunizations


Immunizations up to date: No


Hx Diphtheria, Pertussis, Tetanus Vaccination: Yes





Review of Systems





- Review of Systems


-: Yes ROS unobtainable due to patient's medical condition


Constitutional: No symptoms reported


EENT: No symptoms reported


Cardiovascular: No symptoms reported


Respiratory: No symptoms reported


Gastrointestinal: No symptoms reported


Genitourinary: No symptoms reported


Male Genitourinary: No symptoms reported


Musculoskeletal: No symptoms reported


Skin: No symptoms reported


Hematologic/Lymphatic: No symptoms reported


Neurological/Psychological: See HPI, Seizure


-: Yes All other systems reviewed and negative





Physical Exam





- Vital signs


Vitals: 


                                        











Temp


 


 97.8 F 


 


 05/17/20 11:27














- Notes


Notes: 





Physical Exam:


 


General: Alert, responds to noxious stimuli. Clinching teeth but not currently 

seizing.


 


HEENT: Normocephalic. Atraumatic. PERRL. Extraocular movements intact. 

Oropharynx clear.


 


Neck: Supple. Non-tender.


 


Respiratory: Respiratory rate of 24. Breathing quite deeply with diffuse 

rhonchi.


 


Cardiovascular: Regular rate and rhythm. 


 


Abdominal: Normal Inspection. Non-tender. No distension. Normal Bowel Sounds. 


 


Back: No gross abnormalities. 


 


Extremities: Moves all four extremities.


Upper extremities: Normal inspection. Normal ROM.  


Lower extremities: Normal inspection. No edema. Normal ROM.


 


Neurological: Normal cognition. AAOx4. Normal speech.  


 


Psychological: Normal affect. Normal Mood. 


 


Skin: Warm. Dry. Normal color.





Course





- Re-evaluation


Re-evalutation: 





05/17/20 13:59


Patient did not want to wake up or respond to me.  He had been sleeping for 

quite some time.  Ammonia capsule was refused and he did wake up sit up look 

around and complained about the ammonia capsule.  He did receive 1000 mg dose of

Keppra.  Since he likely will not take another dose when he gets home today, we 

will give him another 500 mg and then discharge him home.


I did not CT his head, he has had 15 head CTs in the past 6 years.  He had CT 

scans of his head about 3 weeks ago and about 7 weeks ago.








- Vital Signs


Vital signs: 


                                        











Temp Pulse Resp BP Pulse Ox


 


 97.8 F      15   167/103 H  98 


 


 05/17/20 11:27     05/17/20 13:15  05/17/20 13:15  05/17/20 13:15














- Laboratory


Result Diagrams: 


                                 05/17/20 11:35





                                 05/17/20 11:35


Laboratory results interpreted by me: 


                                        











  05/17/20 05/17/20





  11:35 11:35


 


Lymph % (Auto)  46.7 H 


 


Seg Neutrophils %  41.3 L 


 


Sodium   145.8 H


 


Carbon Dioxide   11 L


 


Anion Gap   29 H


 


Creatinine   1.45 H


 


Est GFR (MDRD) Non-Af   55 L


 


Glucose   127 H


 


Calcium   11.4 H


 


Creatine Kinase   193 H


 


Total Protein   9.3 H


 


Albumin   5.5 H














- Diagnostic Test


Radiology reviewed: Image reviewed, Reports reviewed - Chest x-ray is 

unremarkable.





- EKG Interpretation by Me


EKG shows normal: Sinus rhythm, Axis, QRS Complexes, ST-T Waves.  abnormal: I

ntervals - Borderline prolonged QT interval


Rate: Normal - 82


Rhythm: NSR


Voltage: Consistant with LVH


P Waves: LAE





Discharge





- Discharge


Clinical Impression: 


 Seizure, Noncompliance w/medication treatment due to intermit use of medication





Hypertension


Qualifiers:


 Hypertension type: essential hypertension Qualified Code(s): I10 - Essential 

(primary) hypertension





Condition: Stable


Disposition: HOME, SELF-CARE


Additional Instructions: 


Seizure, Known Epileptic





     You have had a seizure.  Seizures may "break through" in an epileptic due 

to stress of infection or injury, a change in blood chemistry, or drug and 

alcohol use.  Another common cause is failure to take medication as prescribed.


     Your doctor has evaluated your situation for the likely cause of this 

seizure.  It is important that you follow his advice concerning any medication 

changes and follow-up care.  Further testing of anti-seizure medication levels 

in your blood may be necessary.


     If you have a 's license, it's important that you DO NOT DRIVE until 

given permission by your physician.  This seizure must be reported to the 

's license bureau.


     Call the doctor or return if seizures recur, or if new or unusual symptoms 

arise -- such as severe headache, confusion, excessive sleepiness, local 

weakness or numbness, neck stiffness, or fever.








***********************************

********************************************************************************


***





You have been to the emergency room several times in the last 2 years for 

seizures because you are unwilling to take your medication.


I am concerned that on one of these episodes you may become severely injured or 

die.





Your blood pressure is also usually quite elevated when you come to the 

emergency room for seizures.


I suspect that you are not taking your blood pressure medication or your seizure

medication regularly.


It is very important that you take your medications, or have a family member 

give you your medications every day as it is prescribed.





Follow-up with your primary care provider or your neurologist for further 

evaluation of your recurring seizures.





RETURN TO THE EMERGENCY ROOM IF ANY NEW OR WORSENING SYMPTOMS.











I personally performed the services described in the documentation, reviewed and

edited the documentation which was dictated to the scribe in my presence, and it

accurately records my words and actions.

## 2020-05-17 NOTE — EKG REPORT
SEVERITY:- ABNORMAL ECG -

SINUS RHYTHM

AVELINO, CONSIDER BIATRIAL ABNORMALITIES

LEFT VENTRICULAR HYPERTROPHY

BORDERLINE PROLONGED QT INTERVAL

:

Confirmed by: Bobby Zuleta 17-May-2020 22:18:12

## 2020-05-17 NOTE — RADIOLOGY REPORT (SQ)
EXAM DESCRIPTION:  CT HEAD WITHOUT



IMAGES COMPLETED DATE/TIME:  5/17/2020 4:21 pm



REASON FOR STUDY:  seizure, cocaine abuse



COMPARISON:  4/29/2020.



TECHNIQUE:  Axial images acquired through the brain without intravenous contrast.  Images reviewed wi
th bone, brain and subdural windows.  Additional sagittal and coronal reconstructions were generated.
 Images stored on PACS.

All CT scanners at this facility use dose modulation, iterative reconstruction, and/or weight based d
osing when appropriate to reduce radiation dose to as low as reasonably achievable (ALARA).

[facility]: Dose Right  [facility]: CareDose    [facility]: Dose Right    [facility]: Teradose 4D    [facility]: Smart Technologies



RADIATION DOSE:  CT Rad equipment meets quality standard of care and radiation dose reduction techniq
ues were employed. CTDIvol: 53.2 mGy. DLP: 1044 mGy-cm. mGy.



LIMITATIONS:  None.



FINDINGS:  VENTRICLES: Normal size and contour.

CEREBRUM: No masses.  No hemorrhage.  No midline shift.  No evidence for acute infarction. Normal gra
y/white matter differentiation. No areas of low density in the white matter.

CEREBELLUM: No masses.  No hemorrhage.  No alteration of density.  No evidence for acute infarction.

EXTRAAXIAL SPACES: No fluid collections.  No masses.

ORBITS AND GLOBE: No intra- or extraconal masses.  Normal contour of globe without masses.

CALVARIUM: No fracture.

PARANASAL SINUSES: No fluid or mucosal thickening.

SOFT TISSUES: No mass or hematoma.

OTHER: No other significant finding.



IMPRESSION:  No acute intracranial hemorrhage, mass, or evidence of acute territorial infarct.

EVIDENCE OF ACUTE STROKE: NO.



COMMENT:  Quality ID # 436: Final reports with documentation of one or more dose reduction techniques
 (e.g., Automated exposure control, adjustment of the mA and/or kV according to patient size, use of 
iterative reconstruction technique)



TECHNICAL DOCUMENTATION:  JOB ID:  [id number]

 2011 [employer]- All Rights Reserved



Reading location - IP/workstation name: [email or web address]

## 2020-05-17 NOTE — RADIOLOGY REPORT (SQ)
320



EXAM DESCRIPTION:  CHEST SINGLE VIEW



IMAGES COMPLETED DATE/TIME:  5/17/2020 11:09 am



REASON FOR STUDY:  Post ictal seizure



COMPARISON:  3/29/2020



EXAM PARAMETERS:  NUMBER OF VIEWS: One view.

TECHNIQUE: Single frontal radiographic view of the chest acquired.

RADIATION DOSE: NA

LIMITATIONS: None.



FINDINGS:  LUNGS AND PLEURA: No opacities, masses or pneumothorax. No pleural effusion.

MEDIASTINUM AND HILAR STRUCTURES: No masses.  Contour normal.

HEART AND VASCULAR STRUCTURES: Heart normal in size.  Normal vasculature.

BONES: No acute findings.

HARDWARE: None in the chest.

OTHER: No other significant finding.



IMPRESSION:  NO ACUTE RADIOGRAPHIC FINDING IN THE CHEST.



TECHNICAL DOCUMENTATION:  JOB ID:  [id number]

 2011 [employer]- All Rights Reserved



Reading location - IP/workstation name: [id number]

## 2020-06-02 NOTE — ER DOCUMENT REPORT
Entered by HANNAH GENAO SCRIBE  06/02/20 0717 





Acting as scribe for:GEORGIA PRIDE MD





ED Seizure





- General


Stated Complaint: SEIZURE


Time Seen by Provider: 06/02/20 07:06


Mode of Arrival: Ambulatory


Information source: Patient


Notes: 





This 34 year old male patient presents to the emergency department today for 

complaints of a seizure. Patient is seen in this emergency department for seizu

res quite regularly as he rarely takes his keppra. His keppra levels are usually

undectable here.  Patient is postictal currently so history is limited.








- Related Data


Allergies/Adverse Reactions: 


                                        





hydrocodone [From Vicodin] Allergy (Verified 05/17/20 11:47)


   











Past Medical History





- Social History


Smoking Status: Current Every Day Smoker


Cigarette use (# per day): Yes


Frequency of alcohol use: Occasional


Drug Abuse: Cocaine


Lives with: Family


Family History: Reviewed & Not Pertinent, Other - unable to determine at this 

time





- Past Medical History


Cardiac Medical History: Reports: Hx Hypertension


Neurological Medical History: Reports: Hx Seizures


Psychiatric Medical History: Reports: Hx Anxiety, Hx Depression


Traumatic Medical History: Reports: Hx Traumatic Brain Injury


Surgical Hx: Negative





- Immunizations


Immunizations up to date: No


Hx Diphtheria, Pertussis, Tetanus Vaccination: Yes





Review of Systems





- Review of Systems


-: Yes ROS unobtainable due to patient's medical condition





Physical Exam





- Vital signs


Vitals: 


                                        











Resp BP Pulse Ox


 


 18   149/107 H  95 


 


 06/02/20 07:04  06/02/20 07:04  06/02/20 07:04











Interpretation: Normal





- General


General appearance: Lethargic - Patient is postictal, lethargic minimally 

responsive to noxious stimulus, not conversing.


In distress: None





- HEENT


Head: Normocephalic, Atraumatic


Eyes: Normal


Pupils: PERRL





- Respiratory


Respiratory status: No respiratory distress


Breath sounds: Normal


Chest palpation: Normal





- Cardiovascular


Rhythm: Regular


Heart sounds: Normal auscultation


Murmur: No





- Abdominal


Inspection: Normal


Bowel sounds: Normal


Tenderness: Nontender





- Back


Back: Normal





- Extremities


General upper extremity: Normal inspection


General lower extremity: Normal inspection





- Neurological


Notes: 





On initial exam, the patient is postictal and only responds to noxious stimulus.





- Psychological


Associated symptoms: Other - Cannot evaluate at this time as the patient is 

postictal





- Skin


Skin Temperature: Warm


Skin Moisture: Dry


Skin Color: Normal





Course





- Re-evaluation


Re-evalutation: 





06/02/20 07:58


Less than 10 minutes after I initially saw the patient and ordered a loading 

dose of Keppra IV, he had a grand mal seizure that was terminated with Ativan 

IV.


06/02/20 12:23


The patient has been difficult to wake up at this point.  Ammonia capsule was 

used and he did wake up and then told the nurse that he was not going to wake up

that he was going to go back to sleep.


He is stable for discharge at this time.  He will be encouraged to take his 

seizure medication, however he is not likely to follow through given the 

excessive number of visits to the emergency room with sub-therapeutic Keppra 

levels.





- Vital Signs


Vital signs: 


                                        











Temp Pulse Resp BP Pulse Ox


 


 98.6 F      12   154/123 H  100 


 


 06/02/20 07:18     06/02/20 11:01  06/02/20 12:01  06/02/20 12:01














- Laboratory


Result Diagrams: 


                                 06/02/20 07:10





                                 06/02/20 07:10


Laboratory results interpreted by me: 


                                        











  06/02/20 06/02/20





  07:10 07:15


 


Carbon Dioxide  19 L 


 


Glucose  72 L 


 


Urine Protein   30 H














- EKG Interpretation by Me


EKG shows normal: Sinus rhythm, Axis, Intervals, QRS Complexes.  abnormal: ST-T 

Waves - ST elevation, probable normal early repolarization pattern


Rate: Normal - 79


Rhythm: NSR


Voltage: Consistant with LVH





Discharge





- Discharge


Clinical Impression: 


 Seizure, Cocaine abuse





Condition: Stable


Disposition: HOME, SELF-CARE


Additional Instructions: 


Seizure, Known Epileptic





     You have had a seizure.  Seizures may "break through" in an epileptic due 

to stress of infection or injury, a change in blood chemistry, or drug and 

alcohol use.  Another common cause is failure to take medication as prescribed.


     Your doctor has evaluated your situation for the likely cause of this 

seizure.  It is important that you follow his advice concerning any medication 

changes and follow-up care.  Further testing of anti-seizure medication levels 

in your blood may be necessary.


     If you have a 's license, it's important that you DO NOT DRIVE until 

given permission by your physician.  This seizure must be reported to the 

's license bureau.


     Call the doctor or return if seizures recur, or if new or unusual symptoms 

arise -- such as severe headache, confusion, excessive sleepiness, local 

weakness or numbness, neck stiffness, or fever.





Cocaine Abuse





     Cocaine causes many dangerous medical problems.  Problems can occur even 

with "usual" amounts.  Cocaine affects judgement, creating a sense of 

invulnerability.  Cocaine users often make bad decisions that seem "great" at 

the time.  Most cocaine users eventually will be hurt by bad job performance, 

damaged personal relations, crime, and unsafe sexual practices.


     Toxic effects of cocaine can include seizures, hallucinations, delusions, 

high blood pressure, heart damage, or sudden death.  There's always the risk of 

a "bad batch."  But heart attacks, brain hemorrhages, or cardiac arrest can 

occur unpredictably even with "normal" use.


     Injection of cocaine is risky for abscesses, endocarditis (heart 

infection), pneumonia, and AIDS.


     Withdrawal from cocaine often causes anxiety and drug cravings. Some users 

become paranoid and psychotic.


     Many treatment programs are available, but you must make the decision to 

quit.  Medication can be prescribed to control the symptoms of cocaine toxicity 

(beta blockers or benzodiazepines).  Withdrawal symptoms may require 

tranquilizers.





****************************

********************************************************************************


***************





Please take your Keppra as it is prescribed, and stop using cocaine.


Follow-up with your primary care provider.





RETURN TO THE EMERGENCY ROOM IF ANY NEW OR WORSENING SYMPTOMS.











I personally performed the services described in the documentation, reviewed and

edited the documentation which was dictated to the scribe in my presence, and it

accurately records my words and actions.

## 2020-06-02 NOTE — EKG REPORT
SEVERITY:- ABNORMAL ECG -

SINUS RHYTHM

LEFT VENTRICULAR HYPERTROPHY

ST ELEV, PROBABLE NORMAL EARLY REPOL PATTERN

:

Confirmed by: Bobby Zlueta 02-Jun-2020 19:42:24

## 2020-06-14 NOTE — RADIOLOGY REPORT (SQ)
EXAM DESCRIPTION:  CHEST SINGLE VIEW



IMAGES COMPLETED DATE/TIME:  6/14/2020 5:49 pm



REASON FOR STUDY:  seizure



COMPARISON:  5/17/2020



TECHNIQUE:  Single frontal radiographic view of the chest acquired.



NUMBER OF VIEWS:  One view.



LIMITATIONS:  None.



FINDINGS:  LUNGS AND PLEURA: No pneumothorax. No consolidation or pleural effusion.

MEDIASTINUM AND HILAR STRUCTURES: Stable.

HEART AND VASCULAR STRUCTURES: Stable.

BONES: No acute findings.

HARDWARE: None in the chest.

OTHER: No other significant finding.



IMPRESSION:  NO ACUTE FINDINGS.



TECHNICAL DOCUMENTATION:  JOB ID:  [id number]

[id number]

 2011 [employer]- All Rights Reserved



Reading location - IP/workstation name: [employer]

## 2020-06-14 NOTE — EKG REPORT
SEVERITY:- ABNORMAL ECG -

SINUS TACHYCARDIA

LEFT VENTRICULAR HYPERTROPHY

PROLONGED QT INTERVAL

:

Confirmed by: Nathaly Wood MD 14-Jun-2020 22:43:10

## 2020-06-14 NOTE — ER DOCUMENT REPORT
ED Seizure





<MEÑO RILEY - Last Filed: 06/14/20 19:39>





<JODEE WANG ЕЛЕНА - Last Filed: 06/15/20 00:40>





- General


Chief Complaint: Probable Seizure


Stated Complaint: SEIZURE


Time Seen by Provider: 06/14/20 16:58


Notes: 





CHIEF COMPLAINT: Seizure





HPI: Unable to obtain history from the patient, actively seizing.  Emergently 

called to the room by nursing for an active seizure.  Patient was apparently 

brought in by EMS for a seizure.  EMS did not give patient any medications or 

anticonvulsants on route.  Patient apparently has a longstanding history of 

seizures also a longstanding history of noncompliance with his medications.  

Patient also with a longstanding history of cocaine and drug abuse.





ROS: Unable to obtain secondary to patient condition and acuity 








MEDICATIONS: I agree with the patient medications as charted by the RN.





ALLERGIES: I agree with the allergies as charted by the RN.





PAST MEDICAL HISTORY/PAST SURGICAL HISTORY: Reviewed and agree as charted by RN.





SOCIAL HISTORY: Reviewed and agree as charted by RN.





FAMILY HISTORY: No significant familial comorbid conditions directly related to 

patient complaint





EXAM: Limited examination secondary to active seizure in patient acuity and 

condition


Reviewed vital signs as charted by RN.


CONSTITUTIONAL: Somewhat limited by patient acuity and condition, active seizure


HEAD: Normocephalic; atraumatic


EYES: Conjunctivae clear, sclerae non-icteric


ENT: normal nose; no rhinorrhea; moist mucous membranes; small bite to the 

anterior right lower lip


CARD: Tachycardic; no murmurs, no clicks, no rubs, no gallops; symmetric distal 

pulses


RESP: Normal chest excursion without splinting or tachypnea; breath sounds clear

and equal bilaterally; no wheezes, no rhonchi, no rales, pulse oximetry 98% on 

room air not hypoxic


ABD/GI: Normal bowel sounds; non-distended; soft no palpable organomegaly or 

masses.


BACK:  The back appears normal 


EXT:  no cyanosis, no effusions, no edema   


SKIN: Normal color for age and race; warm; dry; good turgor; no acute lesions n

oted


NEURO: Moves all extremities equally


PSYCH: The patient's mood and manner are postictal





MDM: 34-year-old male having an active seizure with history of seizures, unable 

to obtain history or cooperation during exam, emergently called to the room by 

nursing.  2 mg Ativan given patient was tachycardic with a heart rate of 135 

seizure lasted approximately a minute.  Heart rate improved to 94 after Ativan. 

Patient never stopped breathing.  Pulse oximetry 9697% on room air not hypoxic. 

On review of the patient chart he appears to be cocaine positive for marijuana 

positive the majority of his visits here.  He has a history of noncompliance and

supposed to be on Keppra.  Will give patient loading dose of Keppra here.  Will 

obtain screening labs and monitor patient (MEÑO RILEY)





- Related Data


Allergies/Adverse Reactions: 


                                        





hydrocodone [From Vicodin] Allergy (Verified 05/17/20 11:47)


   











Past Medical History





- Social History


Family History: Reviewed & Not Pertinent, Other - unable to determine at this 

time





- Past Medical History


Cardiac Medical History: Reports: Hx Hypertension


Neurological Medical History: Reports: Hx Seizures


Renal/ Medical History: Denies: Hx Peritoneal Dialysis


Psychiatric Medical History: Reports: Hx Anxiety, Hx Depression


Traumatic Medical History: Reports: Hx Traumatic Brain Injury





- Immunizations


Immunizations up to date: No


Hx Diphtheria, Pertussis, Tetanus Vaccination: Yes





<MEÑO RILEY - Last Filed: 06/14/20 19:39>





- Social History


Smoking Status: Unknown if Ever Smoked





<JODEE WANG - Last Filed: 06/15/20 00:40>





Physical Exam





- Vital signs


Vitals: 


                                        











Resp BP Pulse Ox


 


 14   137/98 H  96 


 


 06/14/20 16:47  06/14/20 16:47  06/14/20 16:47














Course





- Laboratory


Result Diagrams: 


                                 06/14/20 16:55





                                 06/14/20 16:55





<MEÑO RILEY - Last Filed: 06/14/20 19:39>





- Laboratory


Result Diagrams: 


                                 06/14/20 16:55





                                 06/14/20 16:55





<JODEE WANG - Last Filed: 06/15/20 00:40>





- Re-evaluation


Re-evalutation: 





06/14/20 18:50


Patient is sleeping at this time with a heart rate of 90 no distress no seizures


06/14/20 19:17


Patient is still only minimally arousable, he is in a sinus rhythm of 92 on the 

monitor but he is moderately hypertensive with a diastolic blood pressure of 

112.  As he is only minimally arousable still with sternal rub and is refusing 

to open his eyes will give hydralazine IV to help with the blood pressure as he 

has a history of being noncompliant with all of his medications including his 

Keppra which is likely why he presents multiple times with seizures he is likely

not taking his blood pressure medications as well


06/14/20 19:39


I discussed the IV hydralazine ordered with the nurse who is caring for the 

patient.  Patient is still breathing on his own, maintaining an oxygen 

saturation of 100% on room air.  Heart rate is 99.  Blood pressure is 158/114 

but awaiting hydralazine.  Patient is not responsive enough to tolerate oral 

medications at this time.  Report will be given oncoming shift to follow and 

disposition (MEÑO RILEY)





06/14/20 20:09


Patient signed out to me at shift change by outgoing provider listed above.  I 

have evaluated the patient myself at this time.  He is sleeping in the room, 

appears to be resting comfortably.  He has no convulsive movements at this time.

 He is on the monitor with improved blood pressure down to 159/104.  Pending 

laboratory results we will continue to monitor.


06/15/20 00:37


Patient's laboratory results have returned, no acute findings to warrant 

continued care or monitoring here in the emergency department.  Patient has woke

several times asking for nursing care.  He is alert and oriented.  He is stable 

and appropriate for discharge and outpatient follow-up.  Counseled him regarding

the importance of outpatient follow-up advised that he return here any ER 

immediately with any new, persistent or worsening symptoms.  Patient has had no 

further seizure episodes here during his stay from time of signout until 

discharge.


06/15/20 00:38


 (JODEE WANG)





- Vital Signs


Vital signs: 


                                        











Temp Pulse Resp BP Pulse Ox


 


 97.8 F      18   148/100 H  100 


 


 06/14/20 17:31     06/15/20 00:01  06/15/20 00:01  06/15/20 00:01














- Laboratory


Laboratory results interpreted by me: 


                                        











  06/14/20





  16:55


 


Sodium  136.4 L


 


Carbon Dioxide  18 L


 


Magnesium  2.6 H


 


Total Protein  8.4 H














Discharge





<MEÑO RILEY - Last Filed: 06/14/20 19:39>





<JODEE WANG - Last Filed: 06/15/20 00:40>





- Discharge


Clinical Impression: 


 Noncompliance, Seizure, Cocaine abuse





Condition: Stable


Disposition: HOME, SELF-CARE


Instructions:  Seizure, Known Epileptic ([facility]), Cocaine Abuse ([facility])


Additional Instructions: 


Please take your medicines as prescribed.  Please discontinue illicit drug use. 

Please follow-up with your regular doctor in the morning for reevaluation and 

your neurologist.  Please return here or any ER immediately with any new, 

persistent or worsening symptoms.


Forms:  Elevated Blood Pressure

## 2020-06-27 NOTE — ER DOCUMENT REPORT
ED General





- General


Chief Complaint: Probable Seizure


Stated Complaint: POSSIBLE SEIZURE


Time Seen by Provider: 06/27/20 15:39


Mode of Arrival: Medic


Information source: Emergency Med Personnel


Notes: 





34-year-old black male arrives from home via EMS.  He has a long history of 

seizures and his history goes back to at least once a month since 2012 on 

computer records.  Patient reports he knows who he is and why he is here.  EMS 

report wife called transport team because of active seizures at home.  He was 

last seen this month x 2.  Patient actively had a seizure witnessed by staff at 

1556 and he was given IV Ativan 1 mg and was written for Dilantin 1 g IV.  

Patient was seen by Dr. Ring early in June and was found to have a very 

negligible Keppra level.


TRAVEL OUTSIDE OF THE [place] IN LAST 30 DAYS: No





- HPI


Onset: Just prior to arrival


Onset/Duration: Sudden, Better


Quality of pain: No pain


Severity: None


Pain Level: Denies


Associated symptoms: None


Exacerbated by: Denies


Relieved by: Denies


Similar symptoms previously: Yes


Recently seen / treated by doctor: Yes





- Related Data


Allergies/Adverse Reactions: 


                                        





hydrocodone [From Vicodin] Allergy (Verified 06/27/20 17:26)


   











Past Medical History





- General


Information source: Patient, Emergency Med Personnel





- Social History


Smoking Status: Current Every Day Smoker


Cigarette use (# per day): Yes


Chew tobacco use (# tins/day): No


Smoking Education Provided: Yes


Frequency of alcohol use: Occasional


Drug Abuse: Cocaine


Lives with: Family


Family History: Reviewed & Not Pertinent, Other - unable to determine at this 

time


Patient has suicidal ideation: No


Patient has homicidal ideation: No





- Past Medical History


Cardiac Medical History: Reports: Hx Hypertension


Neurological Medical History: Reports: Hx Seizures


Renal/ Medical History: Denies: Hx Peritoneal Dialysis


Psychiatric Medical History: Reports: Hx Anxiety, Hx Depression


Traumatic Medical History: Reports: Hx Traumatic Brain Injury





- Immunizations


Immunizations up to date: No


Hx Diphtheria, Pertussis, Tetanus Vaccination: Yes





Review of Systems





- Review of Systems


Constitutional: See HPI, Weakness


EENT: No symptoms reported


Cardiovascular: No symptoms reported


Respiratory: No symptoms reported


Gastrointestinal: No symptoms reported


Genitourinary: No symptoms reported


Male Genitourinary: No symptoms reported


Musculoskeletal: No symptoms reported


Skin: No symptoms reported


Hematologic/Lymphatic: No symptoms reported


Neurological/Psychological: See HPI, Seizure, Lost consciousness





Physical Exam





- Vital signs


Vitals: 


                                        











Resp Pulse Ox


 


 17   98 


 


 06/27/20 15:40  06/27/20 15:40











Interpretation: Hypertensive





- General


General appearance: Lethargic





- HEENT


Head: Normocephalic, Atraumatic


Eyes: Normal


Pupils: PERRL


Pharynx: Normal


Neck: Normal





- Respiratory


Respiratory status: No respiratory distress


Chest status: Nontender


Breath sounds: Normal


Chest palpation: Normal





Course





- Vital Signs


Vital signs: 


                                        











Temp Pulse Resp BP Pulse Ox


 


 99.2 F   83   27 H  144/121 H  97 


 


 06/27/20 16:41  06/27/20 16:41  06/27/20 17:01  06/27/20 17:01  06/27/20 17:01














- Laboratory


Result Diagrams: 


                                 06/27/20 16:13





                                 06/27/20 16:13


Laboratory results interpreted by me: 


                                        











  06/27/20 06/27/20 06/27/20





  16:13 16:13 16:13


 


WBC  11.4 H  


 


RDW  14.6 H  


 


Absolute Lymphs (auto)  5.0 H  


 


Chloride   108 H 


 


Carbon Dioxide   8 L* 


 


Anion Gap   26 H 


 


Creatinine   1.28 H 


 


Glucose   112 H 


 


Magnesium   2.5 H 


 


Total Protein   8.6 H 


 


Carbamazepine    < 2.4 L














Critical Care Note





- Critical Care Note


Total time excluding time spent on procedures (mins): 90





Discharge





- Discharge


Clinical Impression: 


 Seizure, Cocaine abuse with intoxication





Condition: Fair


Disposition: HOME, SELF-CARE


Additional Instructions: 


Follow-up with personal doctor return to ER for emergencies and avoid using 

cocaine or other mind altering drugs and be sure to take your seizure medication

so you will not be coming back to the ER by ambulance..

## 2020-06-27 NOTE — ER DOCUMENT REPORT
Doctor's Note


Notes: 





06/27/20 20:57


Nurse approach this MD stating that she was getting ready to discharge this 

patient.  Nurse informed this MD that the patient's temperature was 102.1 and if

I was "okay with this" in terms of discharging the patient.  This MD reviewed 

the record and saw that the patient CO2 is 8 and his earlier recorded temp

erature is 99.2.  At this point I am not okay with discharging the patient and 

have ordered some repeat labs, a bag of fluids and will evaluate the patient my 

self.


06/27/20 23:56


Patient was reevaluated by this MD at 2345 hrs.  Patient is sleeping but easily 

arousable.  He appears to be in no acute distress his appearance is nontoxic, 

his heart rate is 85 his SPO2 is 99% on room air blood pressure is 132/91.  

Patient's repeat lab work shows a change in his CO2 from 8-24 with a slight 

increase in his white count from 11.4-14.4.  His CPK is 393.  His current 

temperature is 100.4.  All questions were answered prior to discharge.  

Emergency signs and symptoms, reasons to return to the emergency department 

discussed with patient.

## 2020-07-10 NOTE — ER DOCUMENT REPORT
ED General





- General


Chief Complaint: Probable Seizure


Stated Complaint: SEIZURE


Time Seen by Provider: 07/10/20 11:32


TRAVEL OUTSIDE OF THE [place] IN LAST 30 DAYS: No





- HPI


Notes: 





Patient is a 34-year-old male who presents to the emergency department for 

evaluation after an apparent seizure.  His family found him postictal and called

911.  He is well-known to the department, is frequently not compliant with his 

medications.  The patient states to me that he has not had his medication in 

several days.  He supposed to be taking Keppra and lisinopril.  Otherwise he 

denies any pain.  States he wants to just to be left alone. 





- Related Data


Allergies/Adverse Reactions: 


                                        





hydrocodone [From Vicodin] Allergy (Verified 06/27/20 17:26)


   








Home Medications: Keppra, lisinorpil





Past Medical History





- General


Information source: Patient





- Social History


Smoking Status: Current Every Day Smoker


Frequency of alcohol use: Heavy


Drug Abuse: Cocaine, Other


Family History: Reviewed & Not Pertinent, Other - unable to determine at this 

time


Patient has homicidal ideation: No





- Past Medical History


Cardiac Medical History: Reports: Hx Hypertension


Neurological Medical History: Reports: Hx Seizures


Renal/ Medical History: Denies: Hx Peritoneal Dialysis


Psychiatric Medical History: Reports: Hx Anxiety, Hx Depression


Traumatic Medical History: Reports: Hx Traumatic Brain Injury





- Immunizations


Immunizations up to date: No


Hx Diphtheria, Pertussis, Tetanus Vaccination: Yes





Review of Systems





- Review of Systems


Neurological/Psychological: See HPI


-: Yes All other systems reviewed and negative





Physical Exam





- Vital signs


Vitals: 


                                        











Resp BP Pulse Ox


 


 11 L  153/107 H  97 


 


 07/10/20 11:26  07/10/20 11:26  07/10/20 11:26














- Notes


Notes: 





This is a 34-year-old male who appears his stated age, no acute distress.  He is

lying in the left lateral, right position on the bed.  GCS 14, he will open his 

eyes to verbal stimulus.  Head is normocephalic and appears atraumatic, pupils 

are equal round, reactive to light.  Oral mucosa is moist.  He has a superficial

laceration noted to the right lateral mouth without active bleeding, some 

transudate of fluid noted.  He also has scars on the inner aspect of the lower 

lip consistent with bite trauma, as well as a small hematoma noted to the right 

aspect of the tongue.  Heart is regular rate and rhythm, lungs are clear to 

auscultation bilaterally.  Abdomen is soft, seems nontender with normoactive 

bowel sounds.  Extremities without cyanosis, clubbing.  Posterior calves are non

tender.  Patient awakes to verbal stimuli, will answer questions intermittently.

 He has no gross facial asymmetry.  He moves all 4 extremity spontaneously.  

Reflexes are symmetrical.





Course





- Re-evaluation


Re-evalutation: 





07/10/20 11:59


Patient presents to the emergency department for evaluation.  He had a seizure 

order set placed, seizure precautions were initiated.  I am trying to determine 

whether this patient's tetanus is up-to-date to see if this will need to be 

administered here in the emergency department.  Otherwise, the patient has a 

history of noncompliance, and admits that he is run out of his medications.  He 

is loaded with 1 dose of IV Keppra.  Otherwise, patient currently stable, we 

will continue to monitor.


07/10/20 18:04


Patient was observed for some time here.  He was very sleepy, but eventually 

woke up.  He asked repeatedly for food.  He admits he was not taking his 

medications.  I will write him new prescriptions for lisinopril and Keppra.  

Otherwise repeat neurological exam was performed and it is entirely normal.  

Exam is as follows: Patient is awake, alert, oriented x3.  Cranial nerves II - X

II are grossly intact without focal neurological deficits.  Strength is plus 5 

out of 5 bilateral upper and lower extremities.  Sensation is intact.  Reflexes 

symmetrical.  Intact finger-nose-finger, rapid alternating movements, 

heel-to-shin.


We will discharge patient home.  He is once again told to avoid using cocaine 

and to take his prescription medications as prescribed.  Otherwise he should fo

llow-up with neurology next week, return to the ED with worsening.





- Vital Signs


Vital signs: 


                                        











Temp Pulse Resp BP Pulse Ox


 


 98.3 F   81   15   158/122 H  100 


 


 07/10/20 11:38  07/10/20 11:38  07/10/20 17:01  07/10/20 17:00  07/10/20 15:01














- Laboratory


Result Diagrams: 


                                 07/10/20 12:05





                                 07/10/20 12:05


Laboratory results interpreted by me: 


                                        











  07/10/20 07/10/20 07/10/20





  12:05 12:05 14:54


 


Lymph % (Auto)  7.0 L  


 


Absolute Neuts (auto)  8.6 H  


 


Seg Neutrophils %  86.2 H  


 


Chloride   108 H 


 


Magnesium   2.6 H 


 


Urine Protein    30 H


 


Urine Ketones    TRACE H


 


Urine Blood    SMALL H














Discharge





- Discharge


Clinical Impression: 


 Seizure, Cocaine abuse, Noncompliance with medication regimen





Condition: Stable


Disposition: HOME, SELF-CARE


Instructions:  Seizure, Known Epileptic ([facility]), Cocaine Abuse ([facility])


Additional Instructions: 


Please stop using cocaine.  Please start taking the medications you were 

prescribed.  Follow-up with neurology and primary care next week.  Return to the

emergency department with worsening or new concerning symptoms of any sort.

## 2020-07-28 NOTE — ER DOCUMENT REPORT
ED General





- General


Chief Complaint: Seizure


Stated Complaint: SEIZURE


Time Seen by Provider: 07/28/20 17:12


TRAVEL OUTSIDE OF THE [place] IN LAST 30 DAYS: No





- HPI


Notes: 





Chief complaint: Seizure





History of present illness: 34-year-old male well-known to this ED with chronic 

seizure disorder, alcohol abuse, cocaine abuse, hypertension and noncompliance 

with medications is brought in by EMS again today with episode of brief 

generalized seizure disorder.  No trauma reported.  Patient was mildly postictal

on arrival proceeded to have another generalized tonic-clonic seizure.  

Physician assistant had given Ativan 1 mg prior to my evaluation.  Patient was 

postictal and not able to relate his own history at time I saw him.  I have 

reviewed extensive old records from this facility on patient.





He is supposed to be taking Keppra and lisinopril.





- Related Data


Allergies/Adverse Reactions: 


                                        





hydrocodone [From Vicodin] Allergy (Verified 07/28/20 16:36)


   








Home Medications: keppra, lisinopril





Past Medical History





- General


Information source: Emergency Med Personnel, [facility] Records





- Social History


Smoking Status: Current Some Day Smoker


Frequency of alcohol use: Heavy


Drug Abuse: Cocaine


Family History: Reviewed & Not Pertinent, Other - unable to determine at this 

time


Patient has homicidal ideation: No





- Past Medical History


Cardiac Medical History: Reports: Hx Hypertension


Neurological Medical History: Reports: Hx Seizures


Renal/ Medical History: Denies: Hx Peritoneal Dialysis


Psychiatric Medical History: Reports: Hx Anxiety, Hx Depression


Traumatic Medical History: Reports: Hx Traumatic Brain Injury





- Immunizations


Immunizations up to date: No


Hx Diphtheria, Pertussis, Tetanus Vaccination: Yes





Review of Systems





- Review of Systems


-: Yes ROS unobtainable due to patient's medical condition





Physical Exam





- Vital signs


Vitals: 


                                        











Resp Pulse Ox


 


 24 H  97 


 


 07/28/20 16:24  07/28/20 16:24














- Notes


Notes: 











GENERAL: Well-developed well-nourished male approximately reported age appearing

postictal.





SKIN: Good turgor no rashes.





HEAD: Normocephalic atraumatic.





EYES: PERRLA.  EOMI.  Conjunctivae and sclerae clear.





EARS: CANALS AND TMS CLEAR.





NOSE: CLEAR.





MOUTH: Superficial abrasions of tongue.  Moist mucosa.  Good dentition.  No 

stridor or edema.  No drooling.





NECK: Supple.  No masses or thyromegaly.  No adenopathy.  Carotids 2+ without 

bruits.  No JVD.





BACK: Symmetrical without tenderness.





CHEST: Respirations unlabored.  Breath sounds clear and symmetrical.





HEART: Regular rhythm.  No murmur gallop or rub.





ABDOMEN: Soft nontender without masses, organomegaly or rebound.  Bowel sounds 

normally active.  No bruits.





GENITALIA: Deferred.





EXTREMITIES: No edema.  No calf tenderness.  Cap refill less than 1.5 seconds.  

Dorsalis pedis and posterior tibial pulses 3+ and symmetrical.





NEUROLOGICAL: Postictal. GCS 9 (E2+V2+M5) Cranial nerves II through XII intact. 

Moves extremities symmetrically in response to pain.  1+ ankle clonus 

bilaterally.





PSYCHIATRIC: Appropriate affect.





Course





- Re-evaluation


Re-evalutation: 





07/29/20 02:53


Patient is well-known to this local ED.  He came in with recurrent seizure and 

appears to be noncompliant with his prescribed Keppra.  He slept for most of the

shift here after getting some IV Ativan.  His alcohol is less than 10.  His 

urine was again positive for cocaine.





Patient is subsequently awakened and says he is hungry.  He is able to stand 

without assistance.  We will write him up for discharge when his wife is able to

come and get him and I have cautioned him against use of alcohol or cocaine.  

Also reemphasized need to take his medication as prescribed and I written him a 

new prescription for Keppra.





- Vital Signs


Vital signs: 


                                        











Temp Pulse Resp BP Pulse Ox


 


       14   134/97 H  99 


 


       07/29/20 01:01  07/29/20 01:00  07/28/20 19:02














- Laboratory


Result Diagrams: 


                                 07/28/20 16:30





                                 07/28/20 16:30


Laboratory results interpreted by me: 


                                        











  07/28/20 07/28/20 07/28/20





  16:30 17:30 18:30


 


VBG pCO2    33.9 L


 


VBG HCO3    16.2 L


 


Glucose  125 H  


 


Lactic Acid   13.6 H 














- EKG Interpretation by Me


Additional EKG results interpreted by me: 





07/28/20 18:37


Twelve-lead EKG from 1752 hrs. reviewed contemporaneously by me demonstrating 

normal sinus rhythm with a rate of 92 and a QRS axis of +69 degrees with a 

borderline prolongation of the QT interval at 476 ms.  CT and QRS intervals are 

normal.  There are no acute ST/T wave changes.  The tracing is compared to prior

study from 6/14/2020 and is of similar appearance.  Indication for current 

study: Seizure. 





Discharge





- Discharge


Clinical Impression: 


 Seizure, Noncompliance, Cocaine abuse





Condition: Stable


Disposition: HOME, SELF-CARE


Additional Instructions: 


Cocaine Abuse





     Cocaine causes many dangerous medical problems.  Problems can occur even 

with "usual" amounts.  Cocaine affects judgement, creating a sense of 

invulnerability.  Cocaine users often make bad decisions that seem "great" at 

the time.  Most cocaine users eventually will be hurt by bad job performance, 

damaged personal relations, crime, and unsafe sexual practices.


     Toxic effects of cocaine can include seizures, hallucinations, delusions, 

high blood pressure, heart damage, or sudden death.  There's always the risk of 

a "bad batch."  But heart attacks, brain hemorrhages, or cardiac arrest can 

occur unpredictably even with "normal" use.


     Injection of cocaine is risky for abscesses, endocarditis (heart 

infection), pneumonia, and AIDS.


     Withdrawal from cocaine often causes anxiety and drug cravings. Some users 

become paranoid and psychotic.


     Many treatment programs are available, but you must make the decision to 

quit.  Medication can be prescribed to control the symptoms of cocaine toxicity 

(beta blockers or benzodiazepines).  Withdrawal symptoms may require 

tranquilizers.Seizure, Known Epileptic





     You have had a seizure.  Seizures may "break through" in an epileptic due 

to stress of infection or injury, a change in blood chemistry, or drug and 

alcohol use.  Another common cause is failure to take medication as prescribed.


     Your doctor has evaluated your situation for the likely cause of this 

seizure.  It is important that you follow his advice concerning any medication 

changes and follow-up care.  Further testing of anti-seizure medication levels 

in your blood may be necessary.


     If you have a 's license, it's important that you DO NOT DRIVE until 

given permission by your physician.  This seizure must be reported to the 

's license bureau.


     Call the doctor or return if seizures recur, or if new or unusual symptoms 

arise -- such as severe headache, confusion, excessive sleepiness, local 

weakness or numbness, neck stiffness, or fever.





Prescriptions: 


Levetiracetam [Keppra 500 mg Tablet] 1,000 mg PO Q12 #60 tablet


Referrals: 


[facility] [Provider Group] - Follow up as needed

## 2020-07-28 NOTE — RADIOLOGY REPORT (SQ)
EXAM DESCRIPTION:  CHEST SINGLE VIEW



IMAGES COMPLETED DATE/TIME:  7/28/2020 7:03 pm



REASON FOR STUDY:  seizure



COMPARISON:  6/14/2020



EXAM PARAMETERS:  NUMBER OF VIEWS: One view.

TECHNIQUE: Single frontal radiographic view of the chest acquired.

RADIATION DOSE: NA

LIMITATIONS: None.



FINDINGS:  LUNGS AND PLEURA: No opacities, masses or pneumothorax. No pleural effusion.

MEDIASTINUM AND HILAR STRUCTURES: No masses.  Contour normal.

HEART AND VASCULAR STRUCTURES: Heart normal in size.  Normal vasculature.

BONES: No acute findings.

HARDWARE: None in the chest.

OTHER: No other significant finding.



IMPRESSION:  NO ACUTE RADIOGRAPHIC FINDING IN THE CHEST.



TECHNICAL DOCUMENTATION:  JOB ID:  [id number]

 2011 [employer]- All Rights Reserved



Reading location - IP/workstation name: [email or web address]

## 2020-07-28 NOTE — RADIOLOGY REPORT (SQ)
EXAM DESCRIPTION:  CT HEAD WITHOUT



IMAGES COMPLETED DATE/TIME:  7/28/2020 7:10 pm



REASON FOR STUDY:  Seizure



COMPARISON:  5/17/2020



TECHNIQUE:  Axial images acquired through the brain without intravenous contrast.  Images reviewed wi
th bone, brain and subdural windows.  Additional sagittal and coronal reconstructions were generated.
 Images stored on PACS.

All CT scanners at this facility use dose modulation, iterative reconstruction, and/or weight based d
osing when appropriate to reduce radiation dose to as low as reasonably achievable (ALARA).

[facility]: Dose Right  [facility]: CareDose    [facility]: Dose Right    [facility]: Teradose 4D    [facility]: Smart Technologies



RADIATION DOSE:  CT Rad equipment meets quality standard of care and radiation dose reduction techniq
ues were employed. CTDIvol: 55.2 mGy. DLP: 1084 mGy-cm. mGy.



LIMITATIONS:  None.



FINDINGS:  VENTRICLES: Normal size and contour.

CEREBRUM: No masses.  No hemorrhage.  No midline shift.  No evidence for acute infarction. Normal gra
y/white matter differentiation. No areas of low density in the white matter.

CEREBELLUM: No masses.  No hemorrhage.  No alteration of density.  No evidence for acute infarction.

EXTRAAXIAL SPACES: No fluid collections.  No masses.

ORBITS AND GLOBE: No intra- or extraconal masses.  Normal contour of globe without masses.

CALVARIUM: No fracture.

PARANASAL SINUSES: No fluid or mucosal thickening.

SOFT TISSUES: No mass or hematoma.

OTHER: No other significant finding.



IMPRESSION:  NORMAL BRAIN CT WITHOUT CONTRAST.

EVIDENCE OF ACUTE STROKE: NO.



COMMENT:  Quality ID # 436: Final reports with documentation of one or more dose reduction techniques
 (e.g., Automated exposure control, adjustment of the mA and/or kV according to patient size, use of 
iterative reconstruction technique)



TECHNICAL DOCUMENTATION:  JOB ID:  [id number]

 2011 [employer]- All Rights Reserved



Reading location - IP/workstation name: [email or web address]

## 2020-07-29 NOTE — EKG REPORT
SEVERITY:- ABNORMAL ECG -

SINUS RHYTHM

LEFT VENTRICULAR HYPERTROPHY

BORDERLINE PROLONGED QT INTERVAL

:

Confirmed by: Nathaly Wood MD 29-Jul-2020 08:39:14

## 2020-07-31 NOTE — EKG REPORT
SEVERITY:- ABNORMAL ECG -

SINUS RHYTHM

LEFT VENTRICULAR HYPERTROPHY

:

Confirmed by: Nathaly Wood MD 31-Jul-2020 23:10:45

## 2020-07-31 NOTE — ER DOCUMENT REPORT
ED Seizure





- General


Mode of Arrival: Medic


Information source: Patient, Transfer Record





- HPI


Quality of pain: Achy


Pain Level: 1


Continued on arrival to ED: Yes


Can details of seizure be obtained/verified: Yes


Episode witnessed (by whom): Yes - Spouse


Current seizure medications: Keppra


Post-ictal symptoms: Headache


Injuries: LUE





<MATT RUDD - Last Filed: 07/31/20 08:17>





<MEÑO BRUNSON - Last Filed: 07/31/20 08:37>





- General


Chief Complaint: Seizure


Stated Complaint: SEIZURE


Time Seen by Provider: 07/31/20 03:30


Primary Care Provider: 


[facility] [Provider Group] - Follow up as needed


Notes: 





Patient presents after having a witnessed seizure while he was laying in bed.  

Patient complains of left elbow tenderness.  Patient denies falling out of his 

bed when he had a seizure.  Patient denies any head injury.  Patient has a 

history of hypertension as well as seizure disorder and is well-known to the 

department due to his medication noncompliance.  Patient was seen here earlier 

this month and given a prescription for a month supply of his medications.  

Patient denies any recent illness. (MATT RUDD)





- Related Data


Allergies/Adverse Reactions: 


                                        





hydrocodone [From Vicodin] Allergy (Verified 07/28/20 16:36)


   











Past Medical History





- General


Information source: Patient, Transfer Record





- Social History


Smoking Status: Never Smoker


Frequency of alcohol use: None


Drug Abuse: None


Lives with: Family


Family History: Reviewed & Not Pertinent, Other - unable to determine at this 

time


Patient has homicidal ideation: No





- Past Medical History


Cardiac Medical History: Reports: Hx Hypertension


Neurological Medical History: Reports: Hx Seizures


Renal/ Medical History: Denies: Hx Peritoneal Dialysis


Psychiatric Medical History: Reports: Hx Anxiety, Hx Depression


Traumatic Medical History: Reports: Hx Traumatic Brain Injury


Surgical Hx: Negative





- Immunizations


Immunizations up to date: No


Hx Diphtheria, Pertussis, Tetanus Vaccination: Yes





<MATT RUDD - Last Filed: 07/31/20 08:17>





Review of Systems





- Review of Systems


Constitutional: No symptoms reported.  denies: Fever


EENT: No symptoms reported


Cardiovascular: No symptoms reported


Respiratory: No symptoms reported


Gastrointestinal: No symptoms reported.  denies: Nausea, Vomiting


Genitourinary: No symptoms reported


Male Genitourinary: No symptoms reported


Musculoskeletal: Joint pain - Left elbow


Skin: No symptoms reported


Hematologic/Lymphatic: No symptoms reported


Neurological/Psychological: Seizure, Headaches





<MATT RUDD - Last Filed: 07/31/20 08:17>





Physical Exam





- General


General appearance: Appears well


In distress: None





- HEENT


Head: Normocephalic, Atraumatic


Eyes: Normal


Conjunctiva: Normal


Nasal: Normal


Mucous membranes: Normal


Neck: Normal, Supple





- Respiratory


Respiratory status: No respiratory distress


Chest status: Nontender


Breath sounds: Normal.  No: Rales, Rhonchi, Stridor, Wheezing


Chest palpation: Normal





- Cardiovascular


Rhythm: Regular


Heart sounds: S1 appreciated, S2 appreciated





- Abdominal


Inspection: Normal


Distension: No distension


Bowel sounds: Normal





- Back


Back: Normal, Nontender





- Extremities


General upper extremity: Normal inspection, Normal strength


General lower extremity: Normal inspection, Normal strength


Shoulder: Normal, Nontender


Arm: Normal, Nontender


Elbow: Tender - Left elbow tenderness, no obvious injury or swelling.  No: 

Abrasion, Deformity, Dislocation, Ecchymosis, Instability, Joint effusion, 

Laceration, Limited ROM


Forearm: Normal, Nontender


Wrist: Normal, Nontender





- Neurological


Neuro grossly intact: Yes


Cognition: Normal


Nell Coma Scale Eye Opening: Spontaneous


[place] Coma Scale Verbal: Oriented


[place] Coma Scale Motor: Obeys Commands


Nell Coma Scale Total: 15





- Skin


Skin Temperature: Warm


Skin Moisture: Dry


Skin Color: Normal





<MATT RUDD - Last Filed: 07/31/20 08:17>





- Vital signs


Vitals: 





                                        











Pulse Ox


 


 94 


 


 07/31/20 02:30














- General


Notes: 





Resting with eyes closed, arouses to voice and answers questions appropriately 

(MATT RUDD)





Course





- Laboratory


Result Diagrams: 


                                 07/31/20 02:52





                                 07/31/20 02:52





<MATT RUDD - Last Filed: 07/31/20 08:17>





- Laboratory


Result Diagrams: 


                                 07/31/20 02:52





                                 07/31/20 02:52





<MEÑO BRUNSON - Last Filed: 07/31/20 08:37>





- Re-evaluation


Re-evalutation: 





07/31/20 06:33


Patient sleeping, arouses easily to voice.  Patient's blood pressure has trended

down. 


07/31/20 08:17


Report and handoff given to Meño Brunson NP (MATT RUDD)





07/31/20 08:36


Report was received on the patient.  Patient is clinically sober.  I did e

valuate the patient.  I did review the labs and records.  I spoke with the 

patient at length.  He is aware he has a prescription for Keppra at his 

pharmacy.  He is ambulatory without gait disturbance.  He is answering questions

appropriately at this time and is calling for a ride. (MEÑO BRUNSON)





- Vital Signs


Vital signs: 





                                        











Temp Pulse Resp BP Pulse Ox


 


 98.6 F      15   121/98 H  98 


 


 07/31/20 02:31     07/31/20 07:30  07/31/20 07:30  07/31/20 05:30














- Laboratory


Laboratory results interpreted by me: 





                                        











  07/31/20





  02:52


 


Chloride  111 H














Discharge





<MATT RUDD - Last Filed: 07/31/20 08:17>





<MEÑO BRUNSON - Last Filed: 07/31/20 08:37>





- Discharge


Clinical Impression: 


 Seizure, Noncompliance, Left elbow pain





Disposition: HOME, SELF-CARE


Instructions:  High Blood Pressure ([facility]), Seizure, Known Epileptic ([facility])


Additional Instructions: 


Take your medication as prescribed





Follow-up with a primary doctor





Return as needed for any new or worsening symptoms


Prescriptions: 


Levetiracetam [Keppra 500 mg Tablet] 1,000 mg PO DAILY #60 tablet


Lisinopril [Zestril] 30 mg PO DAILY #30 tablet


Referrals: 


[facility] [Provider Group] - Follow up as needed

## 2020-07-31 NOTE — RADIOLOGY REPORT (SQ)
CLINICAL INDICATION: elbow pain, hx seizure. .



TECHNIQUE: 4 view(s) were obtained of the left elbow. 



COMPARISON: None.



FINDINGS:

No acute displaced fracture is identified of the elbow. 

Alignment appears anatomic.  Joint spaces are within normal

limits for age.  No significant joint effusion.  Surrounding soft

tissues are unremarkable.



IMPRESSION: 

No evidence of acute displaced fracture of the elbow.

## 2020-08-12 NOTE — ER DOCUMENT REPORT
Entered by HANNAH GENAO SCRIBE  08/12/20 0713 





Acting as scribe for:GEORGIA PRIDE MD





ED Substance Abuse / Acc. OD





- General


Chief Complaint: Drug Abuse


Stated Complaint: POSSIBLE ANXIETY ATTACK


Time Seen by Provider: 08/12/20 06:49


Mode of Arrival: Medic


Information source: Patient, Emergency Med Personnel, [facility] Records


Notes: 





This 34-year-old male patient with a long history of seizures and noncompliance,

chronic crack cocaine abuse, comes to the emergency room by EMS complaining of 

not feeling right.  States he had tingling to his hands and feet.  He does admit

to smoking crack tonight.  He was recently admitted to the hospital and 

discharged less than 2 days ago.





TRAVEL OUTSIDE OF THE [place] IN LAST 30 DAYS: No





- Related Data


Allergies/Adverse Reactions: 


                                        





hydrocodone [From Vicodin] Allergy (Verified 07/28/20 16:36)


   











Past Medical History





- General


Information source: Patient, [facility] Records





- Social History


Smoking Status: Current Every Day Smoker


Cigarette use (# per day): Yes


Chew tobacco use (# tins/day): No


Frequency of alcohol use: Occasional


Drug Abuse: Other - Crack cocaine


Family History: Other - Unobtainable due to patient's current cognitive state


Patient has homicidal ideation: No





- Past Medical History


Cardiac Medical History: Reports: Hx Hypertension


Neurological Medical History: Reports: Hx Seizures


Psychiatric Medical History: Reports: Hx Anxiety, Hx Depression


Traumatic Medical History: Reports: Hx Traumatic Brain Injury


Surgical Hx: Negative





- Immunizations


Immunizations up to date: No


Hx Diphtheria, Pertussis, Tetanus Vaccination: Yes





Review of Systems





- Review of Systems


Constitutional: See HPI, Other - Crack cocaine abuse


EENT: No symptoms reported


Cardiovascular: No symptoms reported


Respiratory: No symptoms reported


Gastrointestinal: No symptoms reported


Genitourinary: No symptoms reported


Male Genitourinary: No symptoms reported


Musculoskeletal: No symptoms reported


Skin: No symptoms reported


Hematologic/Lymphatic: No symptoms reported


Neurological/Psychological: See HPI, Tingling - bilateral feet/hands


-: Yes All other systems reviewed and negative





Physical Exam





- Vital signs


Vitals: 


                                        











Temp


 


 98.5 F 


 


 08/12/20 06:03














- Notes


Notes: 





Physical Exam:


 


General: Appears drowsy but will sit up and converse although he has mumbled 

speech that intermittently gets somewhat clear enough to understand.


 


HEENT: Normocephalic. Atraumatic. PERRL. Extraocular movements intact. 

Oropharynx clear.


 


Neck: Supple. Non-tender.


 


Respiratory: No respiratory distress. Clear and equal breath sounds bilaterally.


 


Cardiovascular: Regular rate and rhythm. 


 


Abdominal: Normal Inspection. Non-tender. No distension. Normal Bowel Sounds. 


 


Back: No gross abnormalities. 


 


Extremities: Moves all four extremities.


Upper extremities: Normal inspection. Normal ROM.  


Lower extremities: Normal inspection. No edema. Normal ROM.


 


Neurological: Cognition at baseline. AAOx4. Mumbled speech.  


 


Psychological: Normal affect. Normal Mood. 


 


Skin: Warm. Dry. Normal color.





Course





- Vital Signs


Vital signs: 


                                        











Temp Pulse Resp BP Pulse Ox


 


 98.4 F      15   169/119 H  99 


 


 08/12/20 06:12     08/12/20 08:01  08/12/20 08:01  08/12/20 08:01














- Laboratory


Result Diagrams: 


                                 08/12/20 08:06





                                 08/12/20 08:06


Laboratory results interpreted by me: 


                                        











  08/12/20 08/12/20





  08:06 08:06


 


RBC  4.31 L 


 


Hgb  13.1 L 


 


Creatine Kinase   192 H














- EKG Interpretation by Me


EKG shows normal: Sinus rhythm, Axis, Intervals, QRS Complexes, ST-T Waves


Rate: Normal - 84


Rhythm: NSR


Voltage: Consistent with [facility]H


P Waves: LAE


When compared to previous EKG there are: No significant change





Discharge





- Discharge


Clinical Impression: 


 Anxiety, Crack cocaine use, Seizure disorder, Noncompliance with medications





Condition: Stable


Disposition: HOME, SELF-CARE


Additional Instructions: 


Your symptoms of tingling to your hands and feet suggest an anxiety type 

response, possibly to your crack cocaine use this evening.


You should be sure to take your seizure medications and stop using drugs.


Follow-up with your primary care provider if not feeling better.





RETURN TO THE EMERGENCY ROOM IF ANY NEW OR WORSENING SYMPTOMS.











I personally performed the services described in the documentation, reviewed and

edited the documentation which was dictated to the scribe in my presence, and it

accurately records my words and actions.

## 2020-08-12 NOTE — EKG REPORT
SEVERITY:- ABNORMAL ECG -

SINUS RHYTHM

LEFT ATRIAL ABNORMALITY

LEFT VENTRICULAR HYPERTROPHY

:

Confirmed by: Billy Mathias MD 12-Aug-2020 15:02:11

## 2020-08-12 NOTE — ER DOCUMENT REPORT
ED General





- General


Chief Complaint: Numbness


Stated Complaint: NUMBNESS LEFT ARM


Primary Care Provider: 


HOLLY PALOMARES MD [EMERITUS] - Follow up as needed


TRAVEL OUTSIDE OF THE [place] IN LAST 30 DAYS: No





- HPI


Notes: 





34-year-old male history of hypertension, crack cocaine abuse, epilepsy presents

 with tremor in bilateral upper extremities.  Patient says that this happens 

occasionally for unknown duration and wanted to get it checked out today.  

Patient endorses crack use today.  Patient denies any history of alcohol abuse 

or dependence.  One prior visit for alcohol abuse but no other mention on chart 

review corroborates this.  Patient denies any trauma, seizure since discharge.  

Patient was seen in the ED and had blood work done this morning and was given 

a.m. dose of Keppra.  Patient denies any numbness in contrast to triage 

complaint.  Patient denies any chest pain, neck pain, headache, weakness, change

 in gait, change in vision or speech.





- Related Data


Allergies/Adverse Reactions: 


                                        





hydrocodone [From Vicodin] Allergy (Verified 07/28/20 16:36)


   











Past Medical History





- General


Information source: Patient, [facility] Records





- Social History


Smoking Status: Unknown if Ever Smoked


Family History: Reviewed & Not Pertinent





- Past Medical History


Cardiac Medical History: Reports: Hx Hypertension


Neurological Medical History: Reports: Hx Seizures


Renal/ Medical History: Denies: Hx Peritoneal Dialysis


Psychiatric Medical History: Reports: Hx Anxiety, Hx Depression


Traumatic Medical History: Reports: Hx Traumatic Brain Injury





- Immunizations


Immunizations up to date: No


Hx Diphtheria, Pertussis, Tetanus Vaccination: Yes





Review of Systems





- Review of Systems


Notes: 





REVIEW OF SYSTEMS:


CONSTITUTIONAL :  Denies fever,  chills, or sweats. 


EENT: Denies recent cold/sinus symptoms, denies throat pain


CARDIOVASCULAR:  Denies chest pain, CADENCE


RESPIRATORY:  Denies cough, denies shortness of breath.


GASTROINTESTINAL:  Denies abdominal pain, nausea/vomiting.


GENITOURINARY:  Denies difficulty urinating, painful urination.


MUSCULOSKELETAL:  Denies neck pain, back pain.


SKIN:   Denies rash or skin lesions.


HEMATOLOGIC :   Denies easy bruising or bleeding.


LYMPHATIC:  Denies swollen, enlarged glands.


NEUROLOGICAL:  Denies headache, denies change in gait.


PSYCHIATRIC:  Denies anxiety or stress or depression.





Physical Exam





- Vital signs


Vitals: 


                                        











Resp Pulse Ox


 


 16   100 


 


 08/12/20 21:14  08/12/20 21:14














- Notes


Notes: 





PHYSICAL EXAMINATION:


 


GENERAL: Well-appearing, well-nourished and in no acute distress.


 


HEAD: Atraumatic, normocephalic.


 


EYES: Pupils equal round and appropriate constriction, sclera anicteric, conjun

ctiva are normal.


 


ENT: nares patent, moist mucous membranes.


 


NECK: Normal range of motion, supple without lymphadenopathy, no C-spine 

tenderness


 


LUNGS: Breath sounds clear to auscultation bilaterally and equal.  No wheezes 

rales or rhonchi.  Normal respiratory rate and effort


 


HEART: Regular rate and rhythm without murmurs


 


ABDOMEN: Soft, nontender, no guarding, no masses, no CVAT


 


EXTREMITIES: Normal range of motion, no pitting or edema.  No cyanosis.


 


NEUROLOGICAL: Awake, alert, conversing appropriately, moves all extremities 

spontaneously.  5 out of 5 strength in all extremities, normal sensation in all 

extremities, cranial nerves II through XII intact bilaterally, normal 

finger-to-nose.  Patient has mild resting tremor in bilateral upper extremities 

that extinguishes during finger-to-nose without any psychomotor agitation, 

tongue fasciculations, or lower extremity tremors


 


SKIN: Warm, Dry, normal turgor, no rashes or lesions noted.





Course





- Re-evaluation


Re-evalutation: 





08/12/20 22:21


Paresthesias in all extremities and tremor likely secondary to neurotoxic 

effects of cocaine.  No objective neuro deficits.  Patient had electrolytes 

checked earlier today which were normal, no indication to recheck currently.  

Patient presents with resting tremor that he has had chronically without any 

signs of benzo/barbiturate/alcohol withdrawal which is consistent with patient's

history.  Patient has not filled his seizure medications so nurses put in a 

social work consult to follow-up with him tomorrow.  Patient says he has not 

filled them because he does not have a car.  Will give dose of Keppra in ED and 

DC with PCP and neurology follow-up.  Return precautions given which patient has

demonstrated understanding of.  Patient clinically sober and able to understand 

all instructions and give history.





- Vital Signs


Vital signs: 


                                        











Temp Pulse Resp BP Pulse Ox


 


 98.3 F   80   17   160/120 H  100 


 


 08/12/20 22:11  08/12/20 22:39  08/12/20 23:00  08/12/20 22:01  08/12/20 23:00














Discharge





- Discharge


Clinical Impression: 


 Tremor, Cocaine abuse





Disposition: HOME, SELF-CARE


Additional Instructions: 


Cocaine Abuse





     Cocaine causes many dangerous medical problems.  Problems can occur even 

with "usual" amounts.  Cocaine affects judgement, creating a sense of 

invulnerability.  Cocaine users often make bad decisions that seem "great" at 

the time.  Most cocaine users eventually will be hurt by bad job performance, 

damaged personal relations, crime, and unsafe sexual practices.


     Toxic effects of cocaine can include seizures, hallucinations, delusions, 

high blood pressure, heart damage, or sudden death.  There's always the risk of 

a "bad batch."  But heart attacks, brain hemorrhages, or cardiac arrest can 

occur unpredictably even with "normal" use.


     Injection of cocaine is risky for abscesses, endocarditis (heart 

infection), pneumonia, and AIDS.


     Withdrawal from cocaine often causes anxiety and drug cravings. Some users 

become paranoid and psychotic.


     Many treatment programs are available, but you must make the decision to 

quit.  Medication can be prescribed to control the symptoms of cocaine toxicity 

(beta blockers or benzodiazepines).  Withdrawal symptoms may require 

tranquilizers.





Follow-up with primary doctor and neurologist within 1 week.  Take all 

medications as prescribed.  Return to ED immediately if symptoms worsen.  

Follow-up at [place] for rehab if you are ready to stop using cocaine.


Referrals: 


HOLLY PALOMARES MD [EMERITUS] - Follow up as needed

## 2020-08-17 NOTE — EKG REPORT
SEVERITY:- ABNORMAL ECG -

SINUS RHYTHM

LEFT ATRIAL ABNORMALITY

:

Confirmed by: Bobby Zuleta 17-Aug-2020 08:18:22

## 2020-08-17 NOTE — ER DOCUMENT REPORT
ED General





- General


Chief Complaint: Numbness of Arm


Stated Complaint: ARMS TINGLING


Time Seen by Provider: 08/17/20 06:29


TRAVEL OUTSIDE OF THE [place] IN LAST 30 DAYS: No





- HPI


Notes: 





Chief complaint: Paresthesias left hand





History of present illness: 34-year-old male with history of seizure disorder, 

hypertension, cocaine abuse and noncompliance with medications was seen here 

recently with some paresthesias of the fingers primary provider.  He tested 

positive for cocaine at that time and remembered he was not taking any of his 

medications.  He has a normal neurologic exam and unremarkable labs and was 

discharged home.  He says his symptoms well away completely.  He awakened again 

this morning with tingling of the fingers.  He initially said this was just left

hand with no further questioning says it was actually both hands.  It is now im

proving although it has not completely resolved.  He is evasive when asking 

about cocaine use at this time.  He admits he is not taking his prescribed 

Keppra and lisinopril stating that he does not have a ride to get the drugstore 

to pick them up although the prescriptions waiting for him.  Slight dull 

headache.  No nausea vomiting.  No difficulty with speech, eyesight or 

swallowing.  No motor deficit reported.





- Related Data


Allergies/Adverse Reactions: 


                                        





hydrocodone [From Vicodin] Allergy (Verified 07/28/20 16:36)


   











Past Medical History





- General


Information source: Patient, [facility] Records





- Social History


Smoking Status: Current Every Day Smoker


Frequency of alcohol use: Social - Still has


Drug Abuse: Cocaine


Family History: Reviewed & Not Pertinent


Patient has homicidal ideation: No





- Past Medical History


Cardiac Medical History: Reports: Hx Hypertension


Neurological Medical History: Reports: Hx Seizures


Renal/ Medical History: Denies: Hx Peritoneal Dialysis


Psychiatric Medical History: Reports: Hx Anxiety, Hx Depression


Traumatic Medical History: Reports: Hx Traumatic Brain Injury





- Immunizations


Immunizations up to date: No


Hx Diphtheria, Pertussis, Tetanus Vaccination: Yes





Review of Systems





- Review of Systems


Notes: 





Constitutional: Negative for fever.


HENT: Negative for sore throat.


Eyes: Negative for visual changes.


Cardiovascular: Negative for chest pain.


Respiratory: Negative for shortness of breath.


Gastrointestinal: Negative for abdominal pain, vomiting or diarrhea.


Genitourinary: Negative for dysuria.


Musculoskeletal: Negative for back pain.


Skin: Negative for rash.


Neurological: As per HPI.





10 point ROS negative except as marked above and in HPI.








Physical Exam





- Vital signs


Vitals: 


                                        











Pulse Resp BP Pulse Ox


 


 78   16   180/122 H  98 


 


 08/17/20 06:21  08/17/20 06:21  08/17/20 06:21  08/17/20 06:21














- Notes


Notes: 











GENERAL: Male patient approximately stated age appearing in no acute distress.





SKIN: Good turgor no rashes.





HEAD: Normocephalic atraumatic.





EYES: PERRLA.  EOMI.  Conjunctivae and sclerae clear.





EARS: CANALS AND TMS CLEAR.





NOSE: CLEAR.





MOUTH: Moist mucosa.  Good dentition.  No stridor or edema.  No drooling.





NECK: Supple.  No masses or thyromegaly.  No adenopathy.  Carotids 2+ without 

bruits.  No JVD.





BACK: Symmetrical without tenderness.





CHEST: Respirations unlabored.  Breath sounds clear and symmetrical.





HEART: Regular rhythm.  No murmur gallop or rub.





ABDOMEN: Soft nontender without masses, organomegaly or rebound.  Bowel sounds 

normally active.  No bruits.





GENITALIA: Deferred.





EXTREMITIES: No edema.  No calf tenderness.  Cap refill less than 1.5 seconds.  

Dorsalis pedis and posterior tibial pulses 3+ and symmetrical.





NEUROLOGICAL: GCS 15.  Alert and oriented x3.  Normal gait.  Fluent speech.  

Cranial nerves II through XII intact.  Sensorimotor and cerebellar normal.  

Normal tone.





PSYCHIATRIC: Appropriate affect.





Course





- Re-evaluation


Re-evalutation: 





08/17/20 10:33


Patient was given a dose of IV Keppra here and also was given his prescribed 

dose of oral lisinopril.  Blood pressure came down with this.  Symptoms 

resolved.  Head CT normal.  His EKG was unremarkable.  His CBC, comprehensive 

metabolic profile troponin were all normal.  I think his primary problem here is

noncompliance with prescribed medications.  I spoke with her about this and 

asked if he like to talk with discharge planning team/ regarding 

potential assistance in getting his prescriptions filled.  He initially said yes

and then while I was engaged to critical care activities with a note the patient

decided he wanted to sign out AMA.  I believe he has capacity to make this 

decision and he has signed paperwork and left the department at this time.  He 

has been strongly encouraged to get back on his prescribed medications.





- Vital Signs


Vital signs: 


                                        











Temp Pulse Resp BP Pulse Ox


 


 98 F   78   11 L  143/115 H  100 


 


 08/17/20 06:32  08/17/20 06:34  08/17/20 10:16  08/17/20 10:16  08/17/20 10:16














- Laboratory


Result Diagrams: 


                                 08/17/20 06:37





                                 08/17/20 06:37


Laboratory results interpreted by me: 


                                        











  08/17/20





  06:37


 


Mono % (Auto)  13.9 H














- EKG Interpretation by Me


Additional EKG results interpreted by me: 





08/17/20 07:21


Twelve-lead EKG from 0718 hrs. reviewed contemporaneously by me showing normal 

sinus rhythm with a rate of 64.  There is left atrial abnormality.  QRS axis is 

normal at +4 degrees.  Intervals are all normal.  There are no acute ST/T wave 

changes present.  The tracing is compared to a prior study dated 8/12/2020 and 

no significant interval changes are noted.  Impression: Normal sinus rhythm with

left atrial abnormality.  Indication for current study: Hypertension.





Discharge





- Discharge


Clinical Impression: 


 Paresthesias, Essential hypertension, Seizure disorder, Noncompliance





Disposition: AGAINST MEDICAL ADVICE

## 2020-08-17 NOTE — RADIOLOGY REPORT (SQ)
EXAM DESCRIPTION: 



CT HEAD WITHOUT IV CONTRAST



COMPLETED DATE/TME:  08/17/2020 06:58



CLINICAL HISTORY: 



34 years, Male, bp elevation and paresthesias finger



COMPARISON:

7/28/2020



TECHNIQUE:

Axial CT images of the brain were obtained without contrast.

Sagittal and coronal reformats were performed. [facility]  Images

stored on PACS.

 

All CT scanners at this facility use dose modulation, iterative

reconstruction, and/or weight based dosing when appropriate to

reduce radiation dose to as low as reasonably achievable (ALARA).





[facility]: Dose Right [facility]: CareDose   [facility]: Dose Right    [facility]:

Teradose 4D    [facility]: Smart Technologies



LIMITATIONS:

None.



FINDINGS:



There is no acute cortical infarct, hemorrhage, mass, edema,

hydrocephalus, or extra-axial fluid collection. The gray-white

matter differentiation is preserved. The paranasal sinuses and

mastoid air cells are clear. There is no acute fracture.





IMPRESSION:



No acute intracranial abnormality.

 

TECHNICAL DOCUMENTATION:



Quality ID # [id number]: Final reports with documentation of one or more

dose reduction techniques (e.g., Automated exposure control,

adjustment of the mA and/or kV according to patient size, use of

iterative reconstruction technique)



copyright 2011 [employer]- All Rights Reserved

## 2020-09-14 NOTE — ER DOCUMENT REPORT
ED Medical Screen (RME)





- General


Chief Complaint: Seizure


Stated Complaint: POSSIBLE SEIZURE


Time Seen by Provider: 09/14/20 23:06


Notes: 





Patient is a 34-year-old male, well-known to this emergency department who 

presents the emergency department after having a seizure.  Patient's wife is on 

the phone and states that he had 2 shots of liquor tonight.  Patient has a 

history of hypertension and seizures.  States that he has been taking his m

edications.  States that he is "stressed."  Denies any other substance abuse.  

Patient has history of cocaine use.





Exam: Awake, alert, and oriented.





I have greeted and performed a rapid initial assessment of this patient.  A 

comprehensive ED assessment and evaluation of the patient, analysis of test 

results and completion of medical decision making process will be conducted by 

an additional ED providers.


TRAVEL OUTSIDE OF THE [place] IN LAST 30 DAYS: No





- Related Data


Allergies/Adverse Reactions: 


                                        





hydrocodone [From Vicodin] Allergy (Verified 07/28/20 16:36)


   











Past Medical History





- Past Medical History


Cardiac Medical History: Reports: Hx Hypertension


Neurological Medical History: Reports: Hx Seizures


Renal/ Medical History: Denies: Hx Peritoneal Dialysis


Psychiatric Medical History: Reports: Hx Anxiety, Hx Depression


Traumatic Medical History: Reports: Hx Traumatic Brain Injury





- Immunizations


Immunizations up to date: No


Hx Diphtheria, Pertussis, Tetanus Vaccination: Yes





Physical Exam





- Vital signs


Vitals: 


                                        











Temp Pulse Resp BP Pulse Ox


 


 98.7 F   81   18   159/117 H  96 


 


 09/14/20 22:42  09/14/20 22:42  09/14/20 22:42  09/14/20 22:42  09/14/20 22:42














Course





- Vital Signs


Vital signs: 


                                        











Temp Pulse Resp BP Pulse Ox


 


 98.7 F   81   18   159/117 H  96 


 


 09/14/20 22:42  09/14/20 22:42  09/14/20 22:42  09/14/20 22:42  09/14/20 22:42

## 2020-09-15 NOTE — ER DOCUMENT REPORT
ED Seizure





- General


Chief Complaint: Seizure


Stated Complaint: POSSIBLE SEIZURE


Time Seen by Provider: 09/14/20 23:06


Mode of Arrival: Ambulatory


Information source: Patient


Notes: 





34-year-old male well-known to the emergency room with a history of epilepsy as 

well as hypertension presents to the emergency room after having a seizure prior

to arrival.  Per his wife he had 2 shots of alcohol and then had a seizure.  

While patient was waiting in the lobby he had another seizure and was brought 

directly to his room.  Patient is awake alert oriented at this time.  Answers 

questions and answers questions appropriately.  Told provider that he has not 

taken his medications cannot give a reason for not taking them.  He is in no 

acute distress at this time.


TRAVEL OUTSIDE OF THE [place] IN LAST 30 DAYS: No





- Related Data


Allergies/Adverse Reactions: 


                                        





hydrocodone [From Vicodin] Allergy (Verified 07/28/20 16:36)


   








Home Medications: bp med qday.  seizure med bid





Past Medical History





- General


Information source: Patient





- Social History


Smoking Status: Current Every Day Smoker


Frequency of alcohol use: 2 shyots tonight


Family History: Reviewed & Not Pertinent





- Past Medical History


Cardiac Medical History: Reports: Hx Hypertension


Neurological Medical History: Reports: Hx Seizures


Renal/ Medical History: Denies: Hx Peritoneal Dialysis


Psychiatric Medical History: Reports: Hx Anxiety, Hx Depression


Traumatic Medical History: Reports: Hx Traumatic Brain Injury





- Immunizations


Immunizations up to date: No


Hx Diphtheria, Pertussis, Tetanus Vaccination: Yes





Review of Systems





- Review of Systems


Constitutional: No symptoms reported


EENT: No symptoms reported


Cardiovascular: No symptoms reported


Respiratory: No symptoms reported


Skin: No symptoms reported


Neurological/Psychological: Seizure


-: Yes All other systems reviewed and negative





Physical Exam





- Vital signs


Vitals: 


                                        











Temp Pulse Resp BP Pulse Ox


 


 98.7 F   81   18   159/117 H  96 


 


 09/14/20 22:42  09/14/20 22:42  09/14/20 22:42  09/14/20 22:42  09/14/20 22:42














- Notes


Notes: 








VITAL SIGNS: Within normal limits.


GENERAL: Mild acute distress, non-toxic appearance.  


HEAD:  Normal with no signs of head trauma.


EYES:  PERRLA, EOMI, conjunctiva normal, no discharge.  


EARS:  Hearing grossly intact.


NOSE: Normal.


THROAT:  Oropharynx is normal. 


NECK:  Normal range of motion, no tenderness, supple, no lymphadenopathy, No 

adenopathy, no JVD.   


CHEST:  Clear breath sounds bilaterally.  No wheezes, rales, or rhonchi.  


CARDIAC:  Regular rate and rhythm.  S1 and S2, without murmurs, gallops, or 

rubs.


VASCULAR:  No Edema.  Peripheral pulses normal and equal in all extremities.


ABDOMEN: Normal and soft with no tenderness, no masses or pulsatile masses.  No 

organomegaly.  Positive bowel sounds x4.  No CVA tenderness noted bilaterally.


GASTROINTESTINAL: Bowel sounds normal


GENITOURINARY: Normal, No tenderness


LYMPATHTIC:  No lymphadenopathy noted.


MUSCULOSKELETAL:  Good range of motion of all major joints. Extremities without 

clubbing, cyanosis or edema.  


NEUROLOGICAL:  Alert and oriented x 3.  No focal sensory or strength deficits.  

Speech normal.  Follows commands appropriately.


PSYCHIATRIC:  Normal Affect, judgement and mood.


SKIN:  Normal appearance with no rashes or lesions.





Course





- Re-evaluation


Re-evalutation: 





09/15/20 00:52


Notified nursing staff that patient is actively having a seizure.   IM Ativan 

was ordered as patient never received his IV and loading dose of Keppra that was

ordered an hour and a half ago from triage.








09/15/20 04:26


Patient is resting arousable.  No acute distress noted.  Blood pressure remains 

elevated.  Will give additional dose of of IV hydralazine and reevaluate.


09/15/20 04:27





09/15/20 05:19


Patient is sleeping but arousable.  Answers questions appropriately.  Ambulatory

with a steady gait.  Vital signs are stable.  Counseled on importance of taking 

his medications as prescribed.  Patient was given strict return to the emergency

room guidelines.  Return for any new or worsening symptoms.  All questions were 

answered.  Patient verbalized understanding and agrees with plan of care.


09/15/20 05:21





09/15/20 06:09








- Vital Signs


Vital signs: 


                                        











Temp Pulse Resp BP Pulse Ox


 


 98.7 F   75   15   149/98 H  100 


 


 09/14/20 22:42  09/14/20 23:19  09/15/20 05:15  09/15/20 05:15  09/15/20 02:32














- Laboratory


Result Diagrams: 


                                 09/15/20 00:56





                                 09/15/20 00:56


Laboratory results interpreted by me: 


                                        











  09/15/20 09/15/20 09/15/20





  00:56 00:56 00:56


 


WBC  13.5 H  


 


RDW  14.1 H  


 


Absolute Lymphs (auto)  4.8 H  


 


Chloride   110 H 


 


Carbon Dioxide   11 L 


 


Anion Gap   24 H 


 


Glucose   112 H 


 


Urine Protein    100 H


 


Urine Blood    SMALL H














- Diagnostic Test


Radiology reviewed: Reports reviewed





- EKG Interpretation by Me


Rate: Tachycardia


Additional EKG results interpreted by me: 





09/15/20 00:26


EKG was interpreted by ER physician Dr. Cagle


No acute STEMI


Sinus tachycardia


Probable left atrial abnormality


No ST wave abnormalities


Unchanged from previous EKG of 8/17/2020





Discharge





- Discharge


Clinical Impression: 


 Seizure





Hypertension


Qualifiers:


 Hypertension type: unspecified Qualified Code(s): I10 - Essential (primary) 

hypertension





Condition: Stable


Disposition: HOME, SELF-CARE


Instructions:  High Blood Pressure ([facility]), Seizure, Known Epileptic ([facility])


Additional Instructions: 


It is imperative that you take your medications as prescribed.  Outpatient 

follow-up with primary care physician or with the [facility].  Return to the 

emergency room for any new or worsening symptoms.

## 2020-09-15 NOTE — RADIOLOGY REPORT (SQ)
EXAM DESCRIPTION: 



CT HEAD WITHOUT IV CONTRAST



COMPLETED DATE/TME:  09/14/2020 23:49



CLINICAL HISTORY:  34 years  Male  seizure; high blood pressure; 



COMPARISON:  8/17/2020.



TECHNIQUE:  Contiguous axial CT images obtained through the brain

without IV contrast.  



This exam was performed according to our department optimization

program which includes automated exposure control, adjustment of

the mA and/or kv according to patient size and/or use of

iterative reconstruction technique.



FINDINGS:



The ventricles and sulci are within normal limits for the

patient's age.

No midline shift or mass effect.

No masses identified.

No acute intracranial hemorrhage. 



No fluid or significant mucosal thickening in the visualized

paranasal sinuses.

No depressed calvarial fractures.



IMPRESSION:

  

No acute intracranial abnormality is identified.

## 2020-10-15 NOTE — ER DOCUMENT REPORT
ED General





- General


Chief Complaint: seizures


Stated Complaint: SEIZURES


Time Seen by Provider: 10/15/20 06:34


TRAVEL OUTSIDE OF THE [place] IN LAST 30 DAYS: No





- HPI


Notes: 





34-year-old male arrives via EMS for seizures.  He has a history of seizures, 

typically noncompliant with treatment.  He received 5 mg of Versed with EMS and 

is therefore sedate and not able to participate in exam currently.





- Related Data


Allergies/Adverse Reactions: 


                                        





hydrocodone [From Vicodin] Allergy (Verified 07/28/20 16:36)


   











Past Medical History





- Social History


Smoking Status: Current Every Day Smoker


Chew tobacco use (# tins/day): No


Frequency of alcohol use: None


Drug Abuse: None


Family History: Reviewed & Not Pertinent


Patient has homicidal ideation: No





- Past Medical History


Cardiac Medical History: Reports: Hx Hypertension


Neurological Medical History: Reports: Hx Seizures


Renal/ Medical History: Denies: Hx Peritoneal Dialysis


Psychiatric Medical History: Reports: Hx Anxiety, Hx Depression


Traumatic Medical History: Reports: Hx Traumatic Brain Injury





- Immunizations


Immunizations up to date: No


Hx Diphtheria, Pertussis, Tetanus Vaccination: Yes





Review of Systems





- Review of Systems


-: Yes ROS unobtainable due to patient's medical condition





Physical Exam





- Vital signs


Vitals: 


                                        











Temp Pulse Resp BP Pulse Ox


 


 98.9 F   89   18   183/126 H  96 


 


 10/15/20 06:19  10/15/20 06:19  10/15/20 06:19  10/15/20 06:19  10/15/20 06:19














- General


In distress: None





- HEENT


Head: Normocephalic, Atraumatic


Pupils: PERRL





- Respiratory


Breath sounds: Normal





- Cardiovascular


Rhythm: Regular


Heart sounds: Normal auscultation


Pulses: Normal: Dorsalis pedis





- Abdominal


Distension: No distension





- Extremities


General upper extremity: Normal inspection


General lower extremity: Normal inspection





- Neurological


Notes: 





Patient is sedated, his pupils are equal and reactive, he does spontaneously 

move all extremities and head in the bed though not able to follow commands at 

this time





- Psychological


Associated symptoms: Other - Unable to assess





- Skin


Skin Temperature: Warm





Course





- Re-evaluation


Re-evalutation: 





34-year-old male history seizures, noncompliant, drug abuse with multiple ED 

visits for same here for seizure.  He received 5 mg IM Versed via EMS and is 

therefore sedate.  He does spontaneously move all extremities, pupils are round 

and reactive, no obvious head trauma.  Hemodynamically stable.  Will load with 1

g Keppra IV.  Check labs to assure that no electrolyte abnormality is present.  

Observe for return to baseline.





10/15/20 07:19


Glucose 167 via Accu-Chek





10/15/20 08:00


No leukocytosis or left shift.  No acute anemia.  Slight elevation of sodium and

chloride.  Bicarb low, likely reflecting seizure status, has had similar values 

in the past








10/15/20 08:43


Patient still quite sleepy, however he is able to change positions and bed.  He 

additionally has been able to provide a urine sample.





10/15/20 09:47


Patient has tolerated p.o. fluids





10/15/20 11:02


Patient has ambulated without difficulty.  There has been no further seizure-

like activity while in the emergency department.  He is stable for discharge at 

this time.





- Vital Signs


Vital signs: 


                                        











Temp Pulse Resp BP Pulse Ox


 


 98.9 F   89   24 H  173/117 H  99 


 


 10/15/20 06:19  10/15/20 06:19  10/15/20 10:01  10/15/20 10:00  10/15/20 10:01














- Laboratory


Result Diagrams: 


                                 10/15/20 06:02





                                 10/15/20 06:02


Laboratory results interpreted by me: 


                                        











  10/15/20 10/15/20 10/15/20





  06:02 06:02 06:57


 


RDW  14.1 H  


 


Seg Neuts % (Manual)  35 L  


 


Lymphocytes % (Manual)  51 H  


 


Abs Lymphs (Manual)  5.7 H  


 


Sodium   147.6 H 


 


Chloride   108 H 


 


Carbon Dioxide   < 5 L* 


 


BUN   4 L 


 


Glucose   172 H 


 


POC Glucose    167 H


 


Magnesium   2.5 H 


 


Total Protein   9.6 H 


 


Albumin   5.5 H 


 


Urine Protein   


 


Urine Glucose (UA)   


 


Urine Blood   














  10/15/20





  08:23


 


RDW 


 


Seg Neuts % (Manual) 


 


Lymphocytes % (Manual) 


 


Abs Lymphs (Manual) 


 


Sodium 


 


Chloride 


 


Carbon Dioxide 


 


BUN 


 


Glucose 


 


POC Glucose 


 


Magnesium 


 


Total Protein 


 


Albumin 


 


Urine Protein  100 H


 


Urine Glucose (UA)  50 H


 


Urine Blood  MODERATE H














Discharge





- Discharge


Clinical Impression: 


 Seizure





Disposition: HOME, SELF-CARE


Additional Instructions: 


Please establish with a primary care doctor, I have provided information for Dr. Galvez.  Please take your Keppra.  Return to the emergency department for any 

concerning worsening symptoms.


Referrals: 


CHON GALVEZ MD [ACTIVE STAFF] - Follow up as needed

## 2020-11-11 NOTE — ER DOCUMENT REPORT
ED General





- General


Chief Complaint: Seizure


Stated Complaint: SEIZURE


Time Seen by Provider: 11/11/20 06:23


Notes: 





HPI: 34-year-old male with a history of seizures and noncompliance with 

medications who presents today status post witnessed seizure.  Patient himself 

denies any headache, fevers, vomiting, or diarrhea.  He does admit he has not 

filled his prescription.  He denies currently any weakness or numbness of his 

arms or legs.








ROS:  See HPI


All other review of systems reviewed and otherwise negative





Reviewed vital signs and nursing note as charted by RN.





PHYSICAL EXAM: 





CONSTITUTIONAL: Alert and oriented and responds appropriately to questions. 

Well-appearing; well-nourished





HEAD: Normocephalic; atraumatic





EYES: PERRL; no nystagmus





NECK: Supple without meningismus; non-tender; no cervical lymphadenopathy, no 

masses





CARD: Regular rate and rhythm; no murmurs; symmetric distal pulses





RESP: Normal chest excursion without splinting or tachypnea; breath sounds clear

and equal bilaterally





ABD/GI: Normal bowel sounds; non-distended; soft, non-tender





BACK: The back appears normal and is non-tender to palpation





EXT: Normal ROM in all joints; non-tender to palpation; no edema





SKIN: No acute lesions noted





NEURO: CN 2-12 intact; 5/5 bilateral upper and lower extremity strength with 

sensation intact to light touch





PSYCH: The patient's mood and manner are appropriate. Grooming and personal 

hygiene are appropriate.


TRAVEL OUTSIDE OF THE [place] IN LAST 30 DAYS: No





- Related Data


Allergies/Adverse Reactions: 


                                        





hydrocodone [From Vicodin] Allergy (Verified 11/11/20 07:14)


   











Past Medical History





- Social History


Smoking Status: Never Smoker


Chew tobacco use (# tins/day): No


Frequency of alcohol use: Occasional


Drug Abuse: None


Family History: Reviewed & Not Pertinent


Patient has homicidal ideation: No





- Past Medical History


Cardiac Medical History: Reports: Hx Hypertension


Neurological Medical History: Reports: Hx Seizures


Renal/ Medical History: Denies: Hx Peritoneal Dialysis


Psychiatric Medical History: Reports: Hx Anxiety, Hx Depression


Traumatic Medical History: Reports: Hx Traumatic Brain Injury





- Immunizations


Immunizations up to date: No


Hx Diphtheria, Pertussis, Tetanus Vaccination: Yes





Physical Exam





- Vital signs


Vitals: 


                                        











Temp Resp


 


 98.9 F   18 


 


 11/11/20 05:40  11/11/20 05:40














Course





- Re-evaluation


Re-evalutation: 





11/11/20 06:38


Given the history and physical examination and admitted noncompliance I will 

load the patient with 20/kg of Keppra and obtain basic labs including a liver 

panel.  I do not believe any imaging is currently appropriate or necessary at 

this moment.  





11/11/20 07:21


Patient had a witnessed tonic-clonic seizure here being broken with Ativan.  

Keppra has yet to be given.  Patient is on the monitor currently postictal 

satting 98%.





11/11/20 10:36


Patient has received medications.  Moving all extremities and denies any pain at

this time.  I have expressed the importance of compliance with the medications 

and have rewritten the prescription that we have printed out and given directly 

to the patient.  Patient understands the importance of follow-up and I have 

provided the [facility] information.





- Vital Signs


Vital signs: 


                                        











Temp Pulse Resp BP Pulse Ox


 


 98.3 F   93   14   158/112 H  100 


 


 11/11/20 05:57  11/11/20 05:57  11/11/20 09:34  11/11/20 09:34  11/11/20 09:34














- Laboratory


Result Diagrams: 


                                 11/11/20 05:53





                                 11/11/20 05:53


Laboratory results interpreted by me: 


                                        











  11/11/20 11/11/20 11/11/20





  05:45 05:53 05:53


 


RDW   14.3 H 


 


Chloride    108 H


 


Carbon Dioxide    18 L


 


Urine Protein  30 H  


 


Urine Blood  SMALL H  














Discharge





- Discharge


Clinical Impression: 


 Seizure





Condition: Good


Disposition: HOME, SELF-CARE


Additional Instructions: 


Please take the medications and refrain from alcohol or drugs as discussed.  

Come back immediately with any repeat seizures, pain, fevers, vomiting, or any 

other acute problems.  Please follow-up with your primary care physician or the 

[facility] for follow assessment and evaluation.


Prescriptions: 


Levetiracetam [Keppra 500 mg Tablet] 500 mg PO Q12 #60 tablet


Referrals: 


[facility] [Provider Group] - Follow up as needed

## 2020-11-11 NOTE — EKG REPORT
SEVERITY:- BORDERLINE ECG -

SINUS RHYTHM

PROBABLE LEFT ATRIAL ABNORMALITY

:

Confirmed by: Bobby Zuleta 11-Nov-2020 18:06:22

## 2020-11-12 NOTE — XMS REPORT
Patient Summary Document

                          Created on:2020



Patient:TIBURCIO LOGAN

Sex:Male

:1985

External Reference #:[id number]





Demographics







                          Address                   [address] [place] [id number]

 

                          Home Phone                [phone]

 

                          Preferred Language        Unknown

 

                          Marital Status            Unknown

 

                          Jain Affiliation     Unknown

 

                          Race                      Unknown

 

                          Additional Race(s)        [id number]

 

                          Ethnic Group              Unknown









Author







                          [facility]

 

                          Address                   [facility] [address]



                                                    [place] [address]

 

                          Phone                     [phone]









Care Team Providers







                    Name                Role                Phone

 

                    Unavailable         Unavailable         Unavailable









Allergies, Adverse Reactions, Alerts

This patient has no known allergies or adverse reactions.



Medications

This patient has no known medications.



Problems

This patient has no known problems.



Procedures

This patient has no known procedures.



Results

This patient has no known results.



Encounters







        Start   End     Encounter Admission Attending Care    Care    Encounter



        Date/Time Date/Time Type    Type    Clinicians Facility Department ID

 

        2019 OutPatient                 [facility] [address]



        15:11:46 15:11:46                                         







Payers







             Payer Name   Policy Type  Policy Number Effective Date Expiration D

ate

 

             [id number]                      [id number]  2008 00:00:00 

 

                                     [id number]  2008 00:00:00 







Social History

This patient has no known social history.



Vital Signs

This patient has no known vital signs.

## 2020-12-01 NOTE — EKG REPORT
SEVERITY:- ABNORMAL ECG -

SINUS RHYTHM

PROBABLE LEFT ATRIAL ABNORMALITY

LEFT VENTRICULAR HYPERTROPHY

:

Confirmed by: Billy Mathias MD 01-Dec-2020 10:02:14

## 2020-12-25 NOTE — ER DOCUMENT REPORT
ED Seizure





- General


Chief Complaint: Seizure


Stated Complaint: SEIZURE UNRESPONSIVE


Time Seen by Provider: 20 18:32


Mode of Arrival: Medic


Information source: Emergency Med Personnel


Notes: 





34-year-old male presented to the emergency department with a history of a 

seizure.  Apparently transported to the hospital because of seizure activity 

this afternoon.  Patient has a history of TBI and poor compliance with 

medications.  Has been seen in the emergency department on multiple occasions 

with seizure activity.  It is unclear when he took his last dose of Keppra.





- Related Data


Allergies/Adverse Reactions: 


                                        





hydrocodone [From Vicodin] Allergy (Verified 20 07:14)


   








Home Medications: keppra





Past Medical History





- Social History


Smoking Status: Unknown if Ever Smoked


Frequency of alcohol use: Heavy


Family History: Reviewed & Not Pertinent





- Past Medical History


Cardiac Medical History: Reports: Hx Hypertension


Neurological Medical History: Reports: Hx Seizures


Renal/ Medical History: Denies: Hx Peritoneal Dialysis


Psychiatric Medical History: Reports: Hx Anxiety, Hx Depression


Traumatic Medical History: Reports: Hx Traumatic Brain Injury





- Immunizations


Immunizations up to date: No


Hx Diphtheria, Pertussis, Tetanus Vaccination: Yes





Review of Systems





- Review of Systems


Notes: 





Patient is post ictal status at the time of this evaluation and unable to answer

questions.  The review of system was not completed due to unresponsiveness.





Physical Exam





- Vital signs


Vitals: 


                                        











Resp Pulse Ox


 


 21 H  95 


 


 20 18:21  20 18:21














- Notes


Notes: 





PHYSICAL EXAMINATION:


 


Physical Exam:


General: Well-nourished well-developed no acute distress post ictal status


HEENT: NC/AT, pupils equal round and reactive to light, MM moist,nares clear, 

oropharynx clear, airway patent


Neck: supple, no adenopathy, no masses.  Good range of motion


Lungs: clear, no wheezing, no rales no rhonchi


CVS: Tachycardic rate and rhythm no murmur gallop or rub


Abdomen: Soft, active, nontender, no masses, no hepatosplenomegaly


Ext:   No edema, clubbing or cyanosis.


Neuro: Sleeping, arousable, speech is nonlegible


Skin: Intact no open lesions, no rash








Course





- Vital Signs


Vital signs: 


                                        











Temp Pulse Resp BP Pulse Ox


 


 99.0 F   108 H  15   161/93 H  97 


 


 20 01:52  20 18:24  20 01:09  20 01:09  20 01:09














- Laboratory Results


Result Diagrams: 


                                 20 20:05





                                 20 23:20


Laboratory Results Interpreted: 


                                        











  20





  20:05 22:05 23:20


 


WBC  13.6 H  


 


RBC  5.80 H  


 


Hgb  18.0 H  


 


Hct  51.8 H  


 


RDW  14.8 H  


 


Lymph % (Auto)  5.6 L  


 


Absolute Neuts (auto)  11.7 H  


 


Seg Neutrophils %  85.6 H  


 


Sodium    136.4 L


 


Creatinine    1.42 H


 


Est GFR (MDRD) Non-Af    57 L


 


Magnesium    3.0 H


 


AST    61 H


 


Total Protein    8.5 H


 


Urine Protein   100 H 


 


Urine Blood   SMALL H 








I have reviewed laboratory data and used this information for the treatment 

decisions regarding the patient.


Critical Laboratory Results Reviewed: No Critical Results





- Radiology Results


Critical Radiology Results Reviewed: No Critical Results





Discharge





- Discharge


Clinical Impression: 


 Seizure, Seizure disorder, Noncompliance w/medication treatment due to intermit

use of medication





Condition: Good


Disposition: HOME, SELF-CARE


Instructions:  Seizure, Known Epileptic (OM)


Additional Instructions: 


You were seen in the emergency department tonight with a seizure episode it was 

due to your seizure disorder and poor compliance with seizure medications.  A 

new prescription for Keppra has been sent to your pharmacy.  Please  the 

prescription and take the medications as prescribed.  Please discontinue any 

illicit drug use or alcohol use to reduce the risk of further seizure activity. 

Please follow-up with your primary physician.





If your symptoms are worsening or if you have other concerns you may return to 

the emergency department for further evaluation and treatment








HOME CARE INSTRUCTIONS & INFORMATION:  Thank you for choosing us for your 

medical needs. We hope you're satisfied with the care you received.  After you 

leave, you must properly care for your problem and, at the same time, observe 

its progress.  Any condition can change.  Some illnesses can change rapidly over

hours or days.  If your condition worsens, return to the Emergency Department or

see your physician promptly.





ABOUT YOUR X-RAYS AND EKG'S:   If you had an EKG or X-rays taken, they have been

read by the Emergency Physician. The X-rays and EKG's will also be read by a 

Radiologist or Cardiologist within 24 hours.  If discrepancies are noted, you 

will be notified by telephone.  Please be certain the ED has a correct telephone

number & address where you can be reached.  Also, realize that some fractures or

abnormalities do not show up on initial X-rays.  If your symptoms continue, see 

your physician.





ABOUT YOUR LABORATORY TEST:   If you had laboratory tests, the results have been

reviewed by the Emergency Physician.  Some test results (for example cultures) 

may not be available for several days.  You will be contacted if any test result

shows you need additional treatment.  Please be certain the ED has a correct 

telephone number and address where you can be reached.





ABOUT YOUR MEDICATIONS:  You will receive instructions on how to take your 

medicine on the prescription label you receive.  Additional information may be 

provided by the Pharmacy.  If you have questions afterwards, call the ED for 

clarification or further instructions.  Some prescribed medications may cause 

drowsiness.  Do not perform tasks such as driving a car or operating machinery 

without consulting your Pharmacist.  If you feel you need a refill of pain 

medication, your condition will need re-evaluation.  Please do not call for a 

refill of any medication.





ABOUT YOUR SIGNATURE:   Signature of this document acknowledges to followin. Understanding that you received emergency treatment and that you may be 

released before al medical problems are known or treated. Please be certain   

the ED has a correct phone number & address where you can be reached.


   2. Acknowledgement that you will arrange for follow-up care as recommended.


   3. Authorization for the Emergency Physician to provide information to your 

follow-up Physician in order to maximize your care.





AT ANY TIME, IF YOUR SYMPTOMS CHANGE SIGNIFICANTLY OR WORSEN OR YOU DEVELOP NEW 

SYMPTOMS, RETURN TO THE EMERGENCY DEPARTMENT IMMEDIATELY FOR RE-EVALUATION.





OUR GOAL IS TO PROVIDE EXCELLENT MEDICAL CARE!





WE HOPE THAT WE HAVE MET YOUR EXPECTATIONS DURING YOUR EMERGENCY DEPARTMENT 

VISIT AND THAT YOU FEEL YOU HAVE RECEIVED EXCELLENT CARE!











Prescriptions: 


Levetiracetam [Keppra 500 mg Tablet] 500 mg PO Q12 #60 tablet

## 2021-01-14 NOTE — EKG REPORT
SEVERITY:- ABNORMAL ECG -

SINUS TACHYCARDIA

PROBABLE LEFT ATRIAL ABNORMALITY

PROBABLE LEFT VENTRICULAR HYPERTROPHY

TALL T, CONSIDER METABOLIC/ISCHEMIC ABNRM

BORDERLINE PROLONGED QT INTERVAL

:

Confirmed by: Nathaly Wood MD 14-Jan-2021 08:33:24

## 2021-01-14 NOTE — ER DOCUMENT REPORT
Entered by HANNAH GENAO SCRIBE  01/14/21 0630 





Acting as scribe for:GEORGIA PRIDE MD





ED Seizure





- General


Stated Complaint: AMS,SEIZURE


Mode of Arrival: Ambulatory


Information source: Patient


Notes: 





This 35-year-old male patient with a history of epilepsy presents to the 

emergency department today for complaints of a seizure.  Family on scene 

reported that the patient drank heavily last night as he usually does.  He was 

last seen here for a seizure on 12/25/2020 and he was given a prescription for 

Keppra which he did not fill.  The last time his Keppra level was checked at 

this facility was on June 2, 2020 and it was undetectable at that time.  He was 

seizing in route so he was given 2.5 mg of Versed.








- Related Data


Allergies/Adverse Reactions: 


                                        





hydrocodone [From Vicodin] Allergy (Verified 01/14/21 07:26)


   











Past Medical History





- General


Information source: [facility] Records


Cannot obtain history due to: Altered mental status





- Social History


Smoking Status: Current Every Day Smoker


Cigarette use (# per day): Yes


Frequency of alcohol use: Heavy


Drug Abuse: Cocaine


Lives with: Family


Family History: Reviewed & Not Pertinent





- Past Medical History


Cardiac Medical History: Reports: Hx Hypertension


Neurological Medical History: Reports: Hx Seizures


Psychiatric Medical History: Reports: Hx Anxiety, Hx Depression


Traumatic Medical History: Reports: Hx Traumatic Brain Injury


Surgical Hx: Negative





- Immunizations


Immunizations up to date: No


Hx Diphtheria, Pertussis, Tetanus Vaccination: Yes





Review of Systems





- Review of Systems


-: Yes ROS unobtainable due to patient's medical condition


Neurological/Psychological: See HPI, Seizure





Physical Exam





- Vital signs


Vitals: 


                                        











Resp Pulse Ox


 


 22 H  99 


 


 01/14/21 06:20  01/14/21 06:20














- Notes


Notes: 





Physical Exam:


 


General: Somnolent. 


 


HEENT: Normocephalic. Atraumatic. PERRL. Oropharynx clear.


 


Neck: Supple. Non-tender.


 


Respiratory: Mildly tachypneic. Clear and equal breath sounds bilaterally.


 


Cardiovascular: Tachycardic, regular rhythm.


 


Abdominal: Normal Inspection. Non-tender. No distension. Normal Bowel Sounds. 


 


Back: No gross abnormalities. 


 


Extremities: Moves all four extremities.


Upper extremities: Normal inspection. Normal ROM.  


Lower extremities: Normal inspection. No edema. Normal ROM.


 


Neurological: Somnolent, given versed prior to exam. 


 


Skin: Warm. Dry. Normal color.





Course





- Re-evaluation


Re-evalutation: 





01/14/21 09:57


Patient does have a metabolic acidosis with a serum CO2 of 5.  He is a little 

dehydrated.  Urine drug screen is only positive for marijuana today.





At this time he is asking to eat, is feeling better after receiving 2 L of IV 

fluids.


He has eaten some crackers that were provided here.


He does seem anxious to go home.





Patient states that he is taking his medication, that he has medication at home.

 I told him the prescription he received on 12/25/2020, does not show that it 

was filled when online pharmacy records are accessed.  I told him that some 

pharmacies are not very good at entering this information, but I did know for a 

fact that when I saw him in June 2020 his Keppra level was undetectable.


He is encouraged to take his medication every day and not use marijuana or any 

other drugs.  He is also encouraged to drink plenty of fluids and get plenty of 

sleep today.








- Vital Signs


Vital signs: 


                                        











Temp Pulse Resp BP Pulse Ox


 


 99.3 F      10 L  139/100 H  99 


 


 01/14/21 09:30     01/14/21 09:26  01/14/21 09:26  01/14/21 09:26














- Laboratory Results


Result Diagrams: 


                                 01/14/21 06:32





                                 01/14/21 06:32


Laboratory Results Interpreted: 


                                        











  01/14/21 01/14/21 01/14/21





  06:32 06:32 06:54


 


WBC  11.4 H  


 


RDW  14.5 H  


 


Lymph % (Auto)  46.7 H  


 


Absolute Lymphs (auto)  5.3 H  


 


Sodium   148.4 H 


 


Carbon Dioxide   < 5 L* 


 


Creatinine   1.85 H 


 


Est GFR ( Amer)   50 L 


 


Est GFR (MDRD) Non-Af   42 L 


 


Glucose   120 H 


 


Calcium   10.4 H 


 


Magnesium   2.5 H 


 


Creatine Kinase   258 H 


 


Total Protein   9.0 H 


 


Albumin   5.3 H 


 


Urine Protein    100 H


 


Urine Ketones    TRACE H


 


Urine Blood    SMALL H











Critical Laboratory Results Reviewed: Yes


Attending or Supervising Physician who Reviewed Labs: GEORGIA PRIDE - Metabolic

acidosis





- Radiology Results


Radiology Results Interpreted: 





01/14/21 09:57


Chest x-ray does not show active disease.


Critical Radiology Results Reviewed: No Critical Results





- EKG Interpretation by Me


EKG shows normal: Sinus rhythm, Axis, Intervals, QRS Complexes, ST-T Waves


Rate: Tachycardia - 109


Voltage: Consistent with LVH


P Waves: LAE


When compared to previous EKG there are: No significant change





Discharge





- Discharge


Clinical Impression: 


 Seizure, Metabolic acidosis, Noncompliance w/medication treatment due to 

intermit use of medication





Condition: Stable


Disposition: HOME, SELF-CARE


Additional Instructions: 


Seizure, Known Epileptic





     You have had a seizure.  Seizures may "break through" in an epileptic due 

to stress of infection or injury, a change in blood chemistry, or drug and 

alcohol use.  Another common cause is failure to take medication as prescribed.


     Your doctor has evaluated your situation for the likely cause of this 

seizure.  It is important that you follow his advice concerning any medication 

changes and follow-up care.  Further testing of anti-seizure medication levels 

in your blood may be necessary.


     If you have a 's license, it's important that you DO NOT DRIVE until 

given permission by your physician.  This seizure must be reported to the 

's license bureau.


     Call the doctor or return if seizures recur, or if new or unusual symptoms 

arise -- such as severe headache, confusion, excessive sleepiness, local 

weakness or numbness, neck stiffness, or fever.











*************

********************************************************************************


**************************





Your lab work today suggest you may have been having several seizures recently.


It is important that you get the Keppra prescription filled and take the 

medication to prevent further injury to yourself.


Drink plenty of fluids get plenty of rest today.





Follow-up with a local primary care provider to monitor your medication.





RETURN TO THE EMERGENCY ROOM IF ANY NEW OR WORSENING SYMPTOMS.








Prescriptions: 


Levetiracetam [Keppra 500 mg Tablet] 500 mg PO Q12 #60 tablet





I personally performed the services described in the documentation, reviewed and

edited the documentation which was dictated to the scribe in my presence, and it

accurately records my words and actions.

## 2022-04-20 NOTE — EKG REPORT
SEVERITY:- ABNORMAL ECG -

SINUS RHYTHM

PROLONGED QT INTERVAL

:

Confirmed by: Alvin Fountain MD 06-Nov-2018 18:11:10 Show Asc Variables: Yes

## 2022-06-29 NOTE — RADIOLOGY REPORT (SQ)
EXAM DESCRIPTION:  CT HEAD WITHOUT



COMPLETED DATE/TIME:  2/16/2019 7:43 am



REASON FOR STUDY:  seizure



COMPARISON:  1/3/2019, 8/25/2018, 5/1/2014 CT brain



TECHNIQUE:  Axial images acquired through the brain without intravenous contrast.  Images reviewed wi
th bone, brain and subdural windows.  Additional sagittal and coronal reconstructions were generated.
 Images stored on PACS.

All CT scanners at this facility use dose modulation, iterative reconstruction, and/or weight based d
osing when appropriate to reduce radiation dose to as low as reasonably achievable (ALARA).

[facility]: Dose Right  [facility]: CareDose    [facility]: Dose Right    [facility]: Teradose 4D    [facility]: Smart Eniram



RADIATION DOSE:  CT Rad equipment meets quality standard of care and radiation dose reduction techniq
ues were employed. CTDIvol: 53.2 mGy. DLP: 1044 mGy-cm. mGy.



LIMITATIONS:  None.



FINDINGS:  VENTRICLES: Normal size and contour.

CEREBRUM: No masses.  No hemorrhage.  No midline shift.  No evidence for acute infarction. Normal gra
y/white matter differentiation. No areas of low density in the white matter.

CEREBELLUM: No masses.  No hemorrhage.  No alteration of density.  No evidence for acute infarction.

EXTRAAXIAL SPACES: No fluid collections.  No masses.

ORBITS AND GLOBE: No intra- or extraconal masses.  Normal contour of globe without masses.

CALVARIUM: No fracture.

PARANASAL SINUSES: No fluid or mucosal thickening.

SOFT TISSUES: Left frontal scalp hematoma without underlying skull fracture or acute intracranial hem
orrhage

OTHER: Old bilateral nasal bone fractures



IMPRESSION:  Left frontal scalp hematoma without underlying skull fracture or intracranial hemorrhage


EVIDENCE OF ACUTE STROKE: NO.



COMMENT:  Quality ID # 436: Final reports with documentation of one or more dose reduction techniques
 (e.g., Automated exposure control, adjustment of the mA and/or kV according to patient size, use of 
iterative reconstruction technique)



TECHNICAL DOCUMENTATION:  JOB ID:  [id number]

 2011 [employer]- All Rights Reserved



Reading location - IP/workstation name: [email or web address]
No

## 2023-04-05 NOTE — ER DOCUMENT REPORT
ED Seizure





- General


Chief Complaint: Probable Seizure


Stated Complaint: SEIZURE


Time Seen by Provider: 11/30/20 23:49


Notes: 


Patient is a 34-year-old male who comes emergency department for chief complaint

of a seizure.  Patient has a known history of seizures, has a history of TBI, 

has a history of alcohol abuse and medication noncompliance.  Patient very 

frequently has seizures as a result.  Patient was at home, lying on the couch 

after having 6 beers according to EMS, seized on the couch without fall injury 

or any other complaints.  Patient was postictal afterwards and is awake now, 

states that he has a mild headache but no other complaints, denies recent 

illness or fever, denies vomiting.  He states last time he took his Keppra was 

last night.








- Related Data


Allergies/Adverse Reactions: 


                                        





hydrocodone [From Vicodin] Allergy (Verified 11/11/20 07:14)


   











Past Medical History





- General


Information source: Patient





- Social History


Smoking Status: Unknown if Ever Smoked


Frequency of alcohol use: Occasional


Drug Abuse: None


Lives with: Spouse/Significant other


Family History: Reviewed & Not Pertinent





- Past Medical History


Cardiac Medical History: Reports: Hx Hypertension


Neurological Medical History: Reports: Hx Seizures


Renal/ Medical History: Denies: Hx Peritoneal Dialysis


Psychiatric Medical History: Reports: Hx Anxiety, Hx Depression


Traumatic Medical History: Reports: Hx Traumatic Brain Injury





- Immunizations


Immunizations up to date: No


Hx Diphtheria, Pertussis, Tetanus Vaccination: Yes





Review of Systems





- Review of Systems


Constitutional: See HPI


EENT: No symptoms reported


Cardiovascular: No symptoms reported


Respiratory: No symptoms reported


Gastrointestinal: No symptoms reported


Genitourinary: No symptoms reported


Male Genitourinary: No symptoms reported


Musculoskeletal: No symptoms reported


Skin: No symptoms reported


Hematologic/Lymphatic: No symptoms reported


Neurological/Psychological: See HPI





Physical Exam





- Vital signs


Vitals: 


                                        











Temp Resp Pulse Ox


 


 98.1 F   20   95 


 


 11/30/20 23:47  11/30/20 23:47  11/30/20 23:47














- Notes


Notes: 


GENERAL: Drowsy but arouses to voice, interacts appropriately, no signs of 

distress


HEAD: Normocephalic, atraumatic.


EYES: Pupils equal, round, and reactive to light. Extraocular movements intact.


ENT: Oral mucosa moist, tongue midline, no wound to the tongue noted. Oropharynx

unremarkable. Airway patent. Nares patent, sinuses non-tender, ear canals 

unremarkable, TM's intact.


NECK: Full range of motion. Supple. Trachea midline. No lymphadenopathy.


LUNGS: Clear to auscultation bilaterally, no wheezes, rales, or rhonchi. No 

respiratory distress. Non-tender chest wall. 


HEART: Regular rate and rhythm. No murmur


ABDOMEN: Soft, non-tender. Non-distended. 


EXTREMITIES: Moves all 4 extremities spontaneously. No edema, normal radial and 

dorsalis pedis pulses bilaterally. No cyanosis.


BACK: no cervical, thoracic, lumbar midline tenderness. No saddle anesthesia, 

normal distal neurovascular exam. 


NEUROLOGICAL: Drowsy, responds to voice, oriented to person and place but only 

some events. Normal speech. Cranial nerves II through XII grossly intact. 

Strength 5/5 in all extremities. 


PSYCH: Normal affect, normal mood.


SKIN: Warm, dry, normal turgor. No rashes or lesions noted.








Course





- Re-evaluation


Re-evalutation: 


Patient is drowsy but oriented to person and place, confused about events.  He 

does admit to drinking alcohol today.  He reports a mild headache but no other 

complaints.  Physical exam otherwise unremarkable.  No trauma reported or noted.

 Vital signs nonspecific other than borderline blood pressure.





11/30/20 23:55


Patient had a tonic-clonic seizure, this lasted approximately 45 seconds to 1 

minute, patient stopped just before being administered 2 mg of IV Ativan.  1500 

mg loading bolus of Keppra had been ordered but had not been given yet before 

the seizure activity.  Patient will continue to be monitored, work-up pending.





12/01/20 00:52


Patient's blood pressure became very elevated during his seizure but is now 

declining, but now improved, his postictal and resting.  He will continue to be 

monitored.





CBC unremarkable.  Chemistry shows bicarbonate of 7 and anion gap that is not 

reportable.  Borderline renal function with creatinine 1.43.  Hypernatremia at 

152.  Venous blood gas with pH of 7.11 and low bicarbonate.  Patient has been 

receiving lactated Ringer's.  Discussed with Dr. Guzman.  Patient was 

discharged 2 visits ago with similar laboratory work-up.  Recommendation is 

bolused lactated Ringer's and recheck, if this shows significant improvement and

patient returns to baseline patient can be discharged with instructions to be 

compliant with his medication and avoid alcohol.





12/01/20 07:38


After lactated Ringer's patient had marked improvement and resolution of his 

metabolic acidosis.  Patient is still drowsy from last night and slightly 

unsteady after his medications, patient will be monitored until he is ambulatory

and sober enough to be safely discharged home.





- Vital Signs


Vital signs: 


                                        











Temp Pulse Resp BP Pulse Ox


 


 98.1 F      25 H  161/114 H  96 


 


 11/30/20 23:47     12/01/20 02:31  12/01/20 02:31  12/01/20 06:00














- Laboratory


Result Diagrams: 


                                 12/01/20 00:05





                                 12/01/20 06:53


Laboratory results interpreted by me: 


                                        











  12/01/20 12/01/20 12/01/20





  00:05 00:05 01:22


 


MCHC  31.7 L  


 


RDW  15.5 H  


 


Seg Neuts % (Manual)  32 L  


 


Lymphocytes % (Manual)  63 H  


 


Abs Lymphs (Manual)  6.4 H  


 


VBG pH    7.11 L*


 


VBG pCO2    31.0 L


 


VBG HCO3    9.7 L


 


Sodium   152.5 H 


 


Chloride   114 H 


 


Carbon Dioxide   6 L* 


 


Anion Gap   33 H 


 


Creatinine   1.43 H 


 


Est GFR (MDRD) Non-Af   56 L 


 


Magnesium   2.6 H 


 


Total Protein   9.3 H 


 


Albumin   5.4 H 














  12/01/20





  06:53


 


MCHC 


 


RDW 


 


Seg Neuts % (Manual) 


 


Lymphocytes % (Manual) 


 


Abs Lymphs (Manual) 


 


VBG pH 


 


VBG pCO2 


 


VBG HCO3 


 


Sodium 


 


Chloride  110 H


 


Carbon Dioxide  21 L D


 


Anion Gap 


 


Creatinine 


 


Est GFR (MDRD) Non-Af 


 


Magnesium 


 


Total Protein 


 


Albumin 














- EKG Interpretation by Me


Additional EKG results interpreted by me: 


EKG shows sinus rhythm at a rate of 89, QTc 458, normal axis, LVH by 

measurements, no T wave inversions or ST segment changes in consecutive leads





Discharge





- Discharge


Clinical Impression: 


 Seizure disorder, Metabolic acidosis, Dehydration, Alcohol abuse





Condition: Stable


Disposition: HOME, SELF-CARE


Additional Instructions: 


You have been treated for seizures, alcohol use, dehydration, metabolic 

acidosis.


Do not drink any alcohol.  This increases your seizure risk greatly.  Make sure 

you do not miss any doses of your Keppra.


Follow-up with primary care for additional management.


Return for any concerning symptoms including fever, difficulty breathing, 

vomiting, severe headache, repeat seizures, or any other concerning symptoms.


Prescriptions: 


Levetiracetam [Keppra 500 mg Tablet] 500 mg PO Q12 #60 tablet


Forms:  Return to Work no known allergies

## 2024-06-18 NOTE — RADIOLOGY REPORT (SQ)
EXAM DESCRIPTION:  KUB/ABDOMEN (SINGLE VIEW)



COMPLETED DATE/TIME:  3/1/2020 5:44 pm



REASON FOR STUDY:  Check Placement of NG Tube



COMPARISON:  None.



NUMBER OF VIEWS:  One view.



TECHNIQUE:   Supine radiographic image of the abdomen acquired.



LIMITATIONS:  None.



FINDINGS:  BOWEL GAS PATTERN: Normal bowel gas pattern. No dilated loops.

CALCIFICATIONS: No suspicious calcifications.

SOFT TISSUES: No gross mass or suggestion of organomegaly.

HARDWARE: Subdiaphragmatic course and enteric tube with the tip projecting over the left upper abdome
n over the region of the stomach.

BONES: No acute fracture. No worrisome bone lesions.

OTHER: No other significant finding.



IMPRESSION:  Enteric tube projecting over the left upper abdomen over the region of the stomach.



TECHNICAL DOCUMENTATION:  JOB ID:  [id number]

 2011 [employer]- All Rights Reserved



Reading location - IP/workstation name: [email or web address] Normal rate, regular rhythm.  Heart sounds S1, S2.  No murmurs, rubs or gallops.
